# Patient Record
Sex: FEMALE | Race: WHITE | NOT HISPANIC OR LATINO | Employment: OTHER | URBAN - METROPOLITAN AREA
[De-identification: names, ages, dates, MRNs, and addresses within clinical notes are randomized per-mention and may not be internally consistent; named-entity substitution may affect disease eponyms.]

---

## 2016-05-31 LAB — MAMMOGRAM: NORMAL

## 2017-02-03 DIAGNOSIS — M41.50 DEGENERATIVE SCOLIOSIS IN ADULT PATIENT: ICD-10-CM

## 2017-02-03 DIAGNOSIS — M54.16 RIGHT LUMBAR RADICULOPATHY: ICD-10-CM

## 2017-02-03 DIAGNOSIS — M43.10 ACQUIRED SPONDYLOLISTHESIS: Primary | ICD-10-CM

## 2017-02-03 DIAGNOSIS — M81.0 OSTEOPOROSIS: ICD-10-CM

## 2017-02-03 DIAGNOSIS — M43.10 ANTEROLISTHESIS: ICD-10-CM

## 2017-02-21 ENCOUNTER — HOSPITAL ENCOUNTER (OUTPATIENT)
Dept: PHYSICAL MEDICINE AND REHAB | Facility: CLINIC | Age: 67
Discharge: HOME | End: 2017-02-21
Attending: PHYSICAL MEDICINE & REHABILITATION

## 2017-02-21 ENCOUNTER — HOSPITAL ENCOUNTER (OUTPATIENT)
Dept: PHYSICAL MEDICINE AND REHAB | Facility: CLINIC | Age: 67
Discharge: HOME | End: 2017-02-21

## 2017-02-21 DIAGNOSIS — M54.50 LUMBAR SPINE PAIN: ICD-10-CM

## 2017-02-21 DIAGNOSIS — F32.A DEPRESSION: ICD-10-CM

## 2017-02-21 DIAGNOSIS — F41.1 GENERALIZED ANXIETY DISORDER: ICD-10-CM

## 2017-02-21 DIAGNOSIS — F34.1 PERSISTENT DEPRESSIVE DISORDER: ICD-10-CM

## 2017-02-21 DIAGNOSIS — M79.18 MYOFASCIAL PAIN: ICD-10-CM

## 2017-02-21 DIAGNOSIS — M41.9 SCOLIOSIS: ICD-10-CM

## 2017-02-21 ASSESSMENT — MIFFLIN-ST. JEOR: SCORE: 860.16

## 2017-03-01 ENCOUNTER — COMMUNICATION - HEALTHEAST (OUTPATIENT)
Dept: PHYSICAL MEDICINE AND REHAB | Facility: CLINIC | Age: 67
End: 2017-03-01

## 2017-03-06 ENCOUNTER — COMMUNICATION - HEALTHEAST (OUTPATIENT)
Dept: SCHEDULING | Facility: CLINIC | Age: 67
End: 2017-03-06

## 2017-03-06 ENCOUNTER — HOSPITAL ENCOUNTER (OUTPATIENT)
Dept: RADIOLOGY | Facility: HOSPITAL | Age: 67
Discharge: HOME | End: 2017-03-06
Attending: PHYSICAL MEDICINE & REHABILITATION

## 2017-03-06 ENCOUNTER — HOSPITAL ENCOUNTER (OUTPATIENT)
Dept: PHYSICAL MEDICINE AND REHAB | Facility: CLINIC | Age: 67
Discharge: HOME | End: 2017-03-06
Attending: PHYSICAL MEDICINE & REHABILITATION

## 2017-03-06 DIAGNOSIS — M43.16 SPONDYLOLISTHESIS OF LUMBAR REGION: ICD-10-CM

## 2017-03-06 DIAGNOSIS — M47.816 ARTHROPATHY OF LUMBAR FACET JOINT: ICD-10-CM

## 2017-03-06 DIAGNOSIS — S22.000A THORACIC COMPRESSION FRACTURE (H): ICD-10-CM

## 2017-03-06 DIAGNOSIS — M79.18 MYOFASCIAL PAIN: ICD-10-CM

## 2017-03-06 DIAGNOSIS — M41.9 SCOLIOSIS: ICD-10-CM

## 2017-03-06 DIAGNOSIS — M54.50 LUMBAR SPINE PAIN: ICD-10-CM

## 2017-03-06 DIAGNOSIS — M81.0 OSTEOPOROSIS: ICD-10-CM

## 2017-03-06 ASSESSMENT — MIFFLIN-ST. JEOR: SCORE: 860.16

## 2017-03-07 ENCOUNTER — COMMUNICATION - HEALTHEAST (OUTPATIENT)
Dept: PHYSICAL MEDICINE AND REHAB | Facility: CLINIC | Age: 67
End: 2017-03-07

## 2017-03-08 ENCOUNTER — HOSPITAL ENCOUNTER (OUTPATIENT)
Dept: PHYSICAL MEDICINE AND REHAB | Facility: CLINIC | Age: 67
Discharge: HOME | End: 2017-03-08

## 2017-03-08 DIAGNOSIS — F41.1 GENERALIZED ANXIETY DISORDER: ICD-10-CM

## 2017-03-08 DIAGNOSIS — F34.1 PERSISTENT DEPRESSIVE DISORDER: ICD-10-CM

## 2017-03-15 ENCOUNTER — HOSPITAL ENCOUNTER (OUTPATIENT)
Dept: PHYSICAL MEDICINE AND REHAB | Facility: CLINIC | Age: 67
Discharge: HOME | End: 2017-03-15
Attending: PHYSICAL MEDICINE & REHABILITATION

## 2017-03-15 DIAGNOSIS — M47.816 ARTHROPATHY OF LUMBAR FACET JOINT: ICD-10-CM

## 2017-03-15 DIAGNOSIS — M12.88 OTHER SPECIFIC ARTHROPATHIES, NOT ELSEWHERE CLASSIFIED, OTHER SPECIFIED SITE: ICD-10-CM

## 2017-03-15 DIAGNOSIS — M43.16 SPONDYLOLISTHESIS OF LUMBAR REGION: ICD-10-CM

## 2017-03-15 DIAGNOSIS — M54.50 LUMBAR SPINE PAIN: ICD-10-CM

## 2017-03-21 ENCOUNTER — COMMUNICATION - HEALTHEAST (OUTPATIENT)
Dept: PHYSICAL MEDICINE AND REHAB | Facility: CLINIC | Age: 67
End: 2017-03-21

## 2017-03-21 ENCOUNTER — AMBULATORY - HEALTHEAST (OUTPATIENT)
Dept: PHYSICAL MEDICINE AND REHAB | Facility: CLINIC | Age: 67
End: 2017-03-21

## 2017-03-21 DIAGNOSIS — M47.816 LUMBAR SPONDYLOSIS: ICD-10-CM

## 2017-03-23 ENCOUNTER — RECORDS - HEALTHEAST (OUTPATIENT)
Dept: RADIOLOGY | Facility: CLINIC | Age: 67
End: 2017-03-23

## 2017-03-29 ENCOUNTER — AMBULATORY - HEALTHEAST (OUTPATIENT)
Dept: PHYSICAL MEDICINE AND REHAB | Facility: CLINIC | Age: 67
End: 2017-03-29

## 2017-03-29 ENCOUNTER — HOSPITAL ENCOUNTER (OUTPATIENT)
Dept: PHYSICAL MEDICINE AND REHAB | Facility: CLINIC | Age: 67
Discharge: HOME | End: 2017-03-29

## 2017-03-29 DIAGNOSIS — F41.1 GENERALIZED ANXIETY DISORDER: ICD-10-CM

## 2017-03-29 DIAGNOSIS — F34.1 PERSISTENT DEPRESSIVE DISORDER: ICD-10-CM

## 2017-04-04 ENCOUNTER — HOSPITAL ENCOUNTER (OUTPATIENT)
Dept: PHYSICAL MEDICINE AND REHAB | Facility: CLINIC | Age: 67
Discharge: HOME | End: 2017-04-04
Attending: PHYSICAL MEDICINE & REHABILITATION

## 2017-04-04 DIAGNOSIS — M47.816 LUMBAR SPONDYLOSIS: ICD-10-CM

## 2017-04-10 ENCOUNTER — COMMUNICATION - HEALTHEAST (OUTPATIENT)
Dept: FAMILY MEDICINE | Facility: CLINIC | Age: 67
End: 2017-04-10

## 2017-04-26 ENCOUNTER — HOSPITAL ENCOUNTER (OUTPATIENT)
Dept: PHYSICAL MEDICINE AND REHAB | Facility: CLINIC | Age: 67
Discharge: HOME | End: 2017-04-26
Attending: PHYSICAL MEDICINE & REHABILITATION

## 2017-04-26 DIAGNOSIS — F32.A DEPRESSION: ICD-10-CM

## 2017-04-26 DIAGNOSIS — S22.089A T11 VERTEBRAL FRACTURE (H): ICD-10-CM

## 2017-04-26 DIAGNOSIS — M79.18 MYOFASCIAL PAIN: ICD-10-CM

## 2017-04-26 DIAGNOSIS — M47.816 LUMBAR SPONDYLOSIS: ICD-10-CM

## 2017-04-26 DIAGNOSIS — M54.50 LUMBAR SPINE PAIN: ICD-10-CM

## 2017-04-26 DIAGNOSIS — M43.16 SPONDYLOLISTHESIS OF LUMBAR REGION: ICD-10-CM

## 2017-04-26 ASSESSMENT — MIFFLIN-ST. JEOR: SCORE: 864.69

## 2017-05-02 ENCOUNTER — OFFICE VISIT - HEALTHEAST (OUTPATIENT)
Dept: FAMILY MEDICINE | Facility: CLINIC | Age: 67
End: 2017-05-02

## 2017-05-02 DIAGNOSIS — F51.01 PRIMARY INSOMNIA: ICD-10-CM

## 2017-05-02 DIAGNOSIS — Z00.00 ROUTINE GENERAL MEDICAL EXAMINATION AT A HEALTH CARE FACILITY: ICD-10-CM

## 2017-05-02 DIAGNOSIS — J02.9 PHARYNGITIS: ICD-10-CM

## 2017-05-02 DIAGNOSIS — D12.6 TUBULAR ADENOMA OF COLON: ICD-10-CM

## 2017-05-02 DIAGNOSIS — F33.9 MAJOR DEPRESSION, RECURRENT (H): ICD-10-CM

## 2017-05-02 DIAGNOSIS — Z13.228 ENCOUNTER FOR SCREENING FOR OTHER METABOLIC DISORDERS: ICD-10-CM

## 2017-05-02 DIAGNOSIS — F41.1 ANXIETY, GENERALIZED: ICD-10-CM

## 2017-05-02 ASSESSMENT — MIFFLIN-ST. JEOR: SCORE: 875.46

## 2017-05-03 LAB
CHOLEST SERPL-MCNC: 204 MG/DL
FASTING STATUS PATIENT QL REPORTED: YES
HDLC SERPL-MCNC: 55 MG/DL
LDLC SERPL CALC-MCNC: 131 MG/DL
TRIGL SERPL-MCNC: 88 MG/DL

## 2017-05-07 ENCOUNTER — COMMUNICATION - HEALTHEAST (OUTPATIENT)
Dept: FAMILY MEDICINE | Facility: CLINIC | Age: 67
End: 2017-05-07

## 2017-05-15 ENCOUNTER — OFFICE VISIT - HEALTHEAST (OUTPATIENT)
Dept: FAMILY MEDICINE | Facility: CLINIC | Age: 67
End: 2017-05-15

## 2017-05-15 ENCOUNTER — COMMUNICATION - HEALTHEAST (OUTPATIENT)
Dept: TELEHEALTH | Facility: CLINIC | Age: 67
End: 2017-05-15

## 2017-05-15 DIAGNOSIS — F33.9 MAJOR DEPRESSION, RECURRENT (H): ICD-10-CM

## 2017-05-15 DIAGNOSIS — Z23 IMMUNIZATION DUE: ICD-10-CM

## 2017-05-15 DIAGNOSIS — E78.00 HYPERCHOLESTEROLEMIA: ICD-10-CM

## 2017-05-15 DIAGNOSIS — E87.1 HYPONATREMIA: ICD-10-CM

## 2017-05-16 ENCOUNTER — COMMUNICATION - HEALTHEAST (OUTPATIENT)
Dept: FAMILY MEDICINE | Facility: CLINIC | Age: 67
End: 2017-05-16

## 2017-05-16 ENCOUNTER — TELEPHONE (OUTPATIENT)
Dept: ORTHOPEDICS | Facility: CLINIC | Age: 67
End: 2017-05-16

## 2017-05-16 NOTE — TELEPHONE ENCOUNTER
Dropbox on first floor was cleaned out. Imaging disk and records for Leeanne from July 2016 were still in box. Records and imaging disk were shredded.

## 2017-05-22 ENCOUNTER — COMMUNICATION - HEALTHEAST (OUTPATIENT)
Dept: PHYSICAL MEDICINE AND REHAB | Facility: CLINIC | Age: 67
End: 2017-05-22

## 2017-05-22 ENCOUNTER — COMMUNICATION - HEALTHEAST (OUTPATIENT)
Dept: FAMILY MEDICINE | Facility: CLINIC | Age: 67
End: 2017-05-22

## 2017-05-22 DIAGNOSIS — Z12.11 SCREEN FOR COLON CANCER: ICD-10-CM

## 2017-06-12 ENCOUNTER — OFFICE VISIT - HEALTHEAST (OUTPATIENT)
Dept: FAMILY MEDICINE | Facility: CLINIC | Age: 67
End: 2017-06-12

## 2017-06-12 DIAGNOSIS — E87.1 HYPONATREMIA: ICD-10-CM

## 2017-06-12 DIAGNOSIS — M25.552 LEFT HIP PAIN: ICD-10-CM

## 2017-06-12 DIAGNOSIS — F33.9 MAJOR DEPRESSION, RECURRENT (H): ICD-10-CM

## 2017-06-14 ENCOUNTER — OFFICE VISIT - HEALTHEAST (OUTPATIENT)
Dept: PODIATRY | Facility: CLINIC | Age: 67
End: 2017-06-14

## 2017-06-14 ENCOUNTER — COMMUNICATION - HEALTHEAST (OUTPATIENT)
Dept: FAMILY MEDICINE | Facility: CLINIC | Age: 67
End: 2017-06-14

## 2017-06-14 DIAGNOSIS — L84 TYLOMA: ICD-10-CM

## 2017-06-20 ENCOUNTER — TELEPHONE (OUTPATIENT)
Dept: GASTROENTEROLOGY | Facility: OUTPATIENT CENTER | Age: 67
End: 2017-06-20

## 2017-06-22 ENCOUNTER — TELEPHONE (OUTPATIENT)
Dept: GASTROENTEROLOGY | Facility: OUTPATIENT CENTER | Age: 67
End: 2017-06-22

## 2017-06-22 ENCOUNTER — PRE VISIT (OUTPATIENT)
Dept: OTOLARYNGOLOGY | Facility: CLINIC | Age: 67
End: 2017-06-22

## 2017-06-22 DIAGNOSIS — Z12.11 ENCOUNTER FOR SCREENING COLONOSCOPY: Primary | ICD-10-CM

## 2017-06-22 NOTE — TELEPHONE ENCOUNTER
Patient taking any blood thinners ? Ibuprofen prn    Heart disease ? denies    Lung disease ? denies      Sleep apnea ? denies    Diabetic ? denies    Kidney disease ? denies    Dialysis ?n/a    Electronic implanted medical devices ? denies    Are you taking any narcotic pain medication ? no  What is your daily dosage ?    PTSD ? n/a    Prep instructions reviewed with patient ? Instructions,  policy, MAC sedation plan reviewed. Advised patient to have someone stay with her post exam    Pharmacy : [facility]    Indication for procedure : Screening colonosopy    Referring provider : Self

## 2017-06-22 NOTE — TELEPHONE ENCOUNTER
1.  Date/reason for appt:  6/29/17   Ear Wax    2.  Referring provider:  Self    3.  Call to patient (Yes / No - short description):  No, established pt    Records reviewed.  All records are in Epic

## 2017-06-27 ENCOUNTER — OFFICE VISIT - HEALTHEAST (OUTPATIENT)
Dept: INTERNAL MEDICINE | Facility: CLINIC | Age: 67
End: 2017-06-27

## 2017-06-27 DIAGNOSIS — M81.0 OSTEOPOROSIS: ICD-10-CM

## 2017-06-27 DIAGNOSIS — S22.089A: ICD-10-CM

## 2017-06-28 LAB
ALBUMIN PERCENT: 54.3 % (ref 51–67)
ALBUMIN SERPL ELPH-MCNC: 4 G/DL (ref 3.2–4.7)
ALPHA 1 PERCENT: 3.3 % (ref 2–4)
ALPHA 2 PERCENT: 11.1 % (ref 5–13)
ALPHA1 GLOB SERPL ELPH-MCNC: 0.2 G/DL (ref 0.1–0.3)
ALPHA2 GLOB SERPL ELPH-MCNC: 0.8 G/DL (ref 0.4–0.9)
B-GLOBULIN SERPL ELPH-MCNC: 0.9 G/DL (ref 0.7–1.2)
BETA PERCENT: 12.9 % (ref 10–17)
GAMMA GLOB SERPL ELPH-MCNC: 1.3 G/DL (ref 0.6–1.4)
GAMMA GLOBULIN PERCENT: 18.4 % (ref 9–20)
PATH ICD:: NORMAL
PROT PATTERN SERPL ELPH-IMP: NORMAL
PROT SERPL-MCNC: 7.3 G/DL (ref 6–8)
REVIEWING PATHOLOGIST: NORMAL

## 2017-06-29 LAB
GLIADIN IGA SER-ACNC: 1.2 U/ML
GLIADIN IGG SER-ACNC: 0.7 U/ML
IGA SERPL-MCNC: 276 MG/DL (ref 65–400)
PATH ICD:: NORMAL
PROT PATTERN SERPL ELPH-IMP: NORMAL
REVIEWING PATHOLOGIST: NORMAL
TOTAL PROTEIN RANDOM URINE MG/DL: <7 MG/DL
TTG IGA SER-ACNC: 0.6 U/ML
TTG IGG SER-ACNC: 1.3 U/ML

## 2017-07-01 ENCOUNTER — HEALTH MAINTENANCE LETTER (OUTPATIENT)
Age: 67
End: 2017-07-01

## 2017-07-05 ENCOUNTER — DOCUMENTATION ONLY (OUTPATIENT)
Dept: GASTROENTEROLOGY | Facility: OUTPATIENT CENTER | Age: 67
End: 2017-07-05

## 2017-07-05 ENCOUNTER — TRANSFERRED RECORDS (OUTPATIENT)
Dept: HEALTH INFORMATION MANAGEMENT | Facility: CLINIC | Age: 67
End: 2017-07-05

## 2017-07-10 ENCOUNTER — COMMUNICATION - HEALTHEAST (OUTPATIENT)
Dept: INTERNAL MEDICINE | Facility: CLINIC | Age: 67
End: 2017-07-10

## 2017-07-14 ENCOUNTER — AMBULATORY - HEALTHEAST (OUTPATIENT)
Dept: PHYSICAL MEDICINE AND REHAB | Facility: CLINIC | Age: 67
End: 2017-07-14

## 2017-07-14 ENCOUNTER — COMMUNICATION - HEALTHEAST (OUTPATIENT)
Dept: PHYSICAL MEDICINE AND REHAB | Facility: CLINIC | Age: 67
End: 2017-07-14

## 2017-07-20 ENCOUNTER — AMBULATORY - HEALTHEAST (OUTPATIENT)
Dept: NURSING | Facility: CLINIC | Age: 67
End: 2017-07-20

## 2017-07-20 DIAGNOSIS — M81.0 OSTEOPOROSIS: ICD-10-CM

## 2017-08-08 ENCOUNTER — OFFICE VISIT - HEALTHEAST (OUTPATIENT)
Dept: FAMILY MEDICINE | Facility: CLINIC | Age: 67
End: 2017-08-08

## 2017-08-08 DIAGNOSIS — F33.9 MAJOR DEPRESSION, RECURRENT (H): ICD-10-CM

## 2017-08-08 DIAGNOSIS — H61.20 CERUMEN IMPACTION: ICD-10-CM

## 2017-12-30 ENCOUNTER — COMMUNICATION - HEALTHEAST (OUTPATIENT)
Dept: FAMILY MEDICINE | Facility: CLINIC | Age: 67
End: 2017-12-30

## 2017-12-30 DIAGNOSIS — K64.9 HEMORRHOIDS: ICD-10-CM

## 2018-01-05 ENCOUNTER — COMMUNICATION - HEALTHEAST (OUTPATIENT)
Dept: SURGERY | Facility: CLINIC | Age: 68
End: 2018-01-05

## 2018-01-18 ENCOUNTER — RECORDS - HEALTHEAST (OUTPATIENT)
Dept: ADMINISTRATIVE | Facility: OTHER | Age: 68
End: 2018-01-18

## 2018-01-23 ENCOUNTER — AMBULATORY - HEALTHEAST (OUTPATIENT)
Dept: NURSING | Facility: CLINIC | Age: 68
End: 2018-01-23

## 2018-01-23 DIAGNOSIS — M81.0 OSTEOPOROSIS: ICD-10-CM

## 2018-02-02 ENCOUNTER — RECORDS - HEALTHEAST (OUTPATIENT)
Dept: ADMINISTRATIVE | Facility: OTHER | Age: 68
End: 2018-02-02

## 2018-03-15 ENCOUNTER — OFFICE VISIT - HEALTHEAST (OUTPATIENT)
Dept: FAMILY MEDICINE | Facility: CLINIC | Age: 68
End: 2018-03-15

## 2018-03-15 DIAGNOSIS — J20.9 ACUTE BRONCHITIS: ICD-10-CM

## 2018-03-22 ENCOUNTER — OFFICE VISIT - HEALTHEAST (OUTPATIENT)
Dept: FAMILY MEDICINE | Facility: CLINIC | Age: 68
End: 2018-03-22

## 2018-03-22 DIAGNOSIS — J40 BRONCHITIS: ICD-10-CM

## 2018-03-24 ENCOUNTER — COMMUNICATION - HEALTHEAST (OUTPATIENT)
Dept: FAMILY MEDICINE | Facility: CLINIC | Age: 68
End: 2018-03-24

## 2018-03-27 ENCOUNTER — COMMUNICATION - HEALTHEAST (OUTPATIENT)
Dept: FAMILY MEDICINE | Facility: CLINIC | Age: 68
End: 2018-03-27

## 2018-07-08 ENCOUNTER — HEALTH MAINTENANCE LETTER (OUTPATIENT)
Age: 68
End: 2018-07-08

## 2018-07-12 ENCOUNTER — COMMUNICATION - HEALTHEAST (OUTPATIENT)
Dept: FAMILY MEDICINE | Facility: CLINIC | Age: 68
End: 2018-07-12

## 2018-07-24 ENCOUNTER — RECORDS - HEALTHEAST (OUTPATIENT)
Dept: ADMINISTRATIVE | Facility: OTHER | Age: 68
End: 2018-07-24

## 2018-07-24 ENCOUNTER — OFFICE VISIT - HEALTHEAST (OUTPATIENT)
Dept: INTERNAL MEDICINE | Facility: CLINIC | Age: 68
End: 2018-07-24

## 2018-07-24 ENCOUNTER — RECORDS - HEALTHEAST (OUTPATIENT)
Dept: BONE DENSITY | Facility: CLINIC | Age: 68
End: 2018-07-24

## 2018-07-24 DIAGNOSIS — S22.089A UNSPECIFIED FRACTURE OF T11-T12 VERTEBRA, INITIAL ENCOUNTER FOR CLOSED FRACTURE (H): ICD-10-CM

## 2018-07-24 DIAGNOSIS — M81.0 AGE-RELATED OSTEOPOROSIS WITHOUT CURRENT PATHOLOGICAL FRACTURE: ICD-10-CM

## 2018-07-24 DIAGNOSIS — M81.0 OSTEOPOROSIS: ICD-10-CM

## 2018-07-24 DIAGNOSIS — S22.000S WEDGE COMPRESSION FRACTURE OF UNSPECIFIED THORACIC VERTEBRA, SEQUELA: ICD-10-CM

## 2019-01-24 ENCOUNTER — AMBULATORY - HEALTHEAST (OUTPATIENT)
Dept: NURSING | Facility: CLINIC | Age: 69
End: 2019-01-24

## 2019-01-29 ENCOUNTER — DOCUMENTATION ONLY (OUTPATIENT)
Dept: CARE COORDINATION | Facility: CLINIC | Age: 69
End: 2019-01-29

## 2019-02-01 NOTE — TELEPHONE ENCOUNTER
RECORDS RECEIVED FROM: LOW BACK PAIN, PER DR ANN    DATE RECEIVED: 02/01/19    NOTES STATUS DETAILS   OFFICE NOTE from referring provider Internal    OFFICE NOTE from other specialist Care Everywhere    DISCHARGE SUMMARY from hospital N/A    DISCHARGE REPORT from the ER N/A    OPERATIVE REPORT N/A    MEDICATION LIST Internal    IMPLANT RECORD/STICKER N/A    LABS     CBC/DIFF N/A    CULTURES N/A    INJECTIONS DONE IN RADIOLOGY Care Everywhere    MRI Internal    CT SCAN Internal    XRAYS (IMAGES & REPORTS) Received [facility]   TUMOR     PATHOLOGY  Slides & report N/A      02/01/19  4:58 PM called [facility] for imaging, they will send

## 2019-02-04 ENCOUNTER — TELEPHONE (OUTPATIENT)
Dept: ORTHOPEDICS | Facility: CLINIC | Age: 69
End: 2019-02-04

## 2019-02-04 NOTE — TELEPHONE ENCOUNTER
Called patient and left message asking if they were thinking about surgery. If yes was going to reschedule them for Friday and enter and MRI being the one we have is out of date for Dr. Ventura. If not thinking of surgery they could be seen tomorrow with EOS XRs  Asked them to call back

## 2019-02-05 ENCOUNTER — ANCILLARY PROCEDURE (OUTPATIENT)
Dept: GENERAL RADIOLOGY | Facility: CLINIC | Age: 69
End: 2019-02-05
Attending: ORTHOPAEDIC SURGERY
Payer: COMMERCIAL

## 2019-02-05 ENCOUNTER — ANCILLARY PROCEDURE (OUTPATIENT)
Dept: CT IMAGING | Facility: CLINIC | Age: 69
End: 2019-02-05
Attending: ORTHOPAEDIC SURGERY
Payer: COMMERCIAL

## 2019-02-05 ENCOUNTER — PRE VISIT (OUTPATIENT)
Dept: ORTHOPEDICS | Facility: CLINIC | Age: 69
End: 2019-02-05

## 2019-02-05 ENCOUNTER — OFFICE VISIT (OUTPATIENT)
Dept: ORTHOPEDICS | Facility: CLINIC | Age: 69
End: 2019-02-05
Payer: COMMERCIAL

## 2019-02-05 VITALS — WEIGHT: 95 LBS | HEIGHT: 58 IN | BODY MASS INDEX: 19.94 KG/M2

## 2019-02-05 DIAGNOSIS — Z13.21 SCREENING FOR ENDOCRINE, NUTRITIONAL, METABOLIC AND IMMUNITY DISORDER: ICD-10-CM

## 2019-02-05 DIAGNOSIS — M41.50 DEGENERATIVE SCOLIOSIS IN ADULT PATIENT: ICD-10-CM

## 2019-02-05 DIAGNOSIS — Z13.29 SCREENING FOR ENDOCRINE, NUTRITIONAL, METABOLIC AND IMMUNITY DISORDER: ICD-10-CM

## 2019-02-05 DIAGNOSIS — M41.20 OTHER IDIOPATHIC SCOLIOSIS, UNSPECIFIED SPINAL REGION: ICD-10-CM

## 2019-02-05 DIAGNOSIS — Z13.0 SCREENING FOR ENDOCRINE, NUTRITIONAL, METABOLIC AND IMMUNITY DISORDER: ICD-10-CM

## 2019-02-05 DIAGNOSIS — Z13.228 SCREENING FOR ENDOCRINE, NUTRITIONAL, METABOLIC AND IMMUNITY DISORDER: ICD-10-CM

## 2019-02-05 DIAGNOSIS — E55.9 VITAMIN D DEFICIENCY: ICD-10-CM

## 2019-02-05 DIAGNOSIS — M43.10 ANTEROLISTHESIS: ICD-10-CM

## 2019-02-05 DIAGNOSIS — M43.10 ACQUIRED SPONDYLOLISTHESIS: Primary | ICD-10-CM

## 2019-02-05 LAB
MRSA DNA SPEC QL NAA+PROBE: NEGATIVE
SPECIMEN SOURCE: NORMAL

## 2019-02-05 RX ORDER — CHOLECALCIFEROL (VITAMIN D3) 50 MCG
2000 TABLET ORAL DAILY
Qty: 100 TABLET | Refills: 3 | Status: SHIPPED | OUTPATIENT
Start: 2019-02-05 | End: 2019-03-12

## 2019-02-05 RX ORDER — INFLUENZA VACCINE, ADJUVANTED 15; 15; 15 UG/.5ML; UG/.5ML; UG/.5ML
INJECTION, SUSPENSION INTRAMUSCULAR
Refills: 0 | COMMUNITY
Start: 2018-11-15 | End: 2022-11-09

## 2019-02-05 RX ORDER — ESCITALOPRAM OXALATE 10 MG/1
TABLET ORAL
Refills: 3 | COMMUNITY
Start: 2018-10-08 | End: 2022-03-11

## 2019-02-05 RX ORDER — CALCIUM CARBONATE 500(1250)
1 TABLET ORAL DAILY
Qty: 90 TABLET | Refills: 3 | Status: SHIPPED | OUTPATIENT
Start: 2019-02-05 | End: 2019-03-12

## 2019-02-05 ASSESSMENT — ENCOUNTER SYMPTOMS
MUSCLE WEAKNESS: 1
ARTHRALGIAS: 1
BACK PAIN: 1
MYALGIAS: 1
NECK PAIN: 1
STIFFNESS: 1
JOINT SWELLING: 0
MUSCLE CRAMPS: 0

## 2019-02-05 ASSESSMENT — MIFFLIN-ST. JEOR: SCORE: 845.67

## 2019-02-05 NOTE — LETTER
2/5/2019       RE: Leeanne Duarte  [id number]  [place] [address]     Dear Colleague,    Thank you for referring your patient, Leeanne Duarte, to the [facility] Please see a copy of my visit note below.    Spine Surgery Consultation    REFERRING PHYSICIAN: Referred Self   PRIMARY CARE PHYSICIAN: Robert Montemayor           Chief Complaint:   Consult (low back pain )      History of Present Illness:  Symptom Profile Including: location of symptoms, onset, severity, exacerbating/alleviating factors, previous treatments:        Leeanne Duarte is a 68 year old female who presents for evaluation of predominantly right buttock and leg symptoms in an L5 and S1 distribution.  She has a modest amount of back pain but she says this is tolerable and what really bothers her is the leg pain.  It is worse in the buttock and leg and worse when she is up and walking and improves somewhat with rest.  She is been dealing with this for a number of years.  She tried gabapentin at one point and it made her totally dizzy and she could not tolerate it.  She did several rounds of physical therapy in 2016 2017 and she is continued the exercises at home and she thinks they are no longer effective.  She is been doing yoga pretty regularly.  She had a series of epidural injections one in 2016 and then again in 2017.  The 2016 injection was a right L5-S1 transforaminal epidural and she says that helped her feel amazing for several weeks but she did not want to continue it because she has osteoporosis and she was worried about the effect of bone health of the steroids.    Over the last year she thinks things have been worsening.  She has a past bad experience with an infection after a hamstring reconstruction and it was quite a lot to go through so she has been hesitant to consider surgery but she thinks things are getting bad enough that she  "might want to do it now.         Past Medical History:   No past medical history on file.         Past Surgical History:     Past Surgical History:   Procedure Laterality Date     CATARACT IOL, RT/LT  2014            Social History:     Social History     Tobacco Use     Smoking status: Never Smoker     Smokeless tobacco: Never Used   Substance Use Topics     Alcohol use: Yes     Comment: 7 per week            Family History:     Family History   Problem Relation Age of Onset     High cholesterol Mother      Hypertension Mother      Myocardial Infarction Mother 72     High cholesterol Father      Hypertension Father      Myocardial Infarction Father 76     Colon Cancer Maternal Grandmother      Colon Cancer Maternal Grandfather             Allergies:     Allergies   Allergen Reactions     Chocolate      Orange             Medications:     Current Outpatient Medications   Medication     Ascorbic Acid (VITAMIN C) 500 MG CAPS     calcium carbonate (OS-LIVE) 500 MG tablet     Calcium Citrate-Vitamin D (CALCIUM CITRATE + D) 300-100 MG-UNIT TABS     IBUPROFEN PO     LORAZEPAM PO     vitamin D3 (CHOLECALCIFEROL) 2000 units tablet     escitalopram (LEXAPRO) 10 MG tablet     estrogens, conjugated, (PREMARIN) 0.3 MG tablet     FLUAD 0.5 ML TALI     No current facility-administered medications for this visit.              Review of Systems:     A 10 point ROS was performed and reviewed. Specific responses to these questions are noted at the end of the document.         Physical Exam:   Vitals: Ht 1.473 m (4' 10\")   Wt 43.1 kg (95 lb)   BMI 19.86 kg/m     Constitutional: awake, alert, cooperative, no apparent distress, appears stated age.    Eyes: The sclera are white.  Ears, Nose, Throat: The trachea is midline.  Psychiatric: The patient has a normal affect.  Respiratory: breathing non-labored  Cardiovascular: The extremities are warm and perfused.  Skin: no obvious rashes or lesions.  Musculoskeletal, Neurologic, and Spine: "          Lumbar Spine:    Appearance - No gross stepoffs or deformities    Motor -     L2-3: Hip flexion 5/5 R and 5/5 L strength          L3/4:  Knee extension R 5/5 and L 5/5 strength         L4/5:  Foot dorsiflexion R 5/5 L 5/5 and       EHL dorsiflexion R 4/5 L 4/5 strength         S1:  Plantarflexion/Peroneal Muscles  R 5/5 and L 5/5 strength    Sensation: intact to light touch L3-S1 distribution BLE      Neurologic:      REFLEXES Left Right                  Patella 1+ 1+   Ankle jerk 1+ 1+   Babinski No upgoing great toe No upgoing great toe   Clonus 0 beats 0 beats     Hip Exam:  No pain with hip log roll and no tenderness over the greater trochanters.    Alignment:  Patient stands with a neutral standing sagittal balance.           Imaging:   We ordered and independently reviewed new radiographs at this clinic visit. The results were discussed with the patient.  Findings include:    May 5, 2016 lumbar MRI shows severe multilevel degenerative spondylosis with moderate to severe lateral recess stenosis L4-5 due to trace anterolisthesis and facet hypertrophy as well as severe right greater than left foraminal stenosis L5-S1 due to broad-based disc bulge and severe spondylosis moderate spondylosis and mild to moderate stenosis at L3-4 with significant disc space height loss and disc osteophyte complex    Standing scoliosis radiographs February 5, 2019 show an apex at L2-3 with a 27 degree lumbar curvature.  Multilevel degenerative changes.             Assessment and Plan:   Assessment:  68 year old female with degenerative scoliosis and foraminal stenosis L5-S1 with central stenosis L3-4 and L4-5.     Plan:  1. I am going to refer her for another round of lumbar physical therapy and a repeat right-sided L5-S1 transforaminal epidural injection since she previously did well with that injection in the past.  2. I am going to update her lumbar CT and MRI scan since the previous scan is 3 years old.    If she did  need surgery, based on her previous imaging studies I would likely recommend an L3-S1 decompression with an L5-S1 fusion to address the foraminal stenosis at L5-S1 and the central stenosis from L3-S1.    Risks of this surgery include risk of infection, risk of dural tear resulting in CSF leak which might result in headaches, or possible need for lumbar drain, or possible revision surgery in the setting of a persistent leak. Risk of hematoma or seroma resulting in wound complications.  Possible nerve root injury resulting in numbness weakness or paralysis into the arms or legs. Possible radiculitis which could result in similar symptoms or could result in significant neurogenic type pain. There is also a risk of delayed onset nerve root pals or radiculitis, which I explained is potentially due to stretch injury or possibly due to delayed onset swelling, and is sometimes temporary but can also be permanent.  Risk of incomplete decompression which might require revision surgery in the future.  Risk of adjacent segment problems requiring surgery in the future. Risk of incomplete relief of symptoms possibly requiring revision surgery in the future. Furthermore, although rare, there are risks of major vessel injury such as to the major vessels anteriorly or to the bowel from the surgery.  Sometimes this can happen if an instrument is passed anteriorly through the disc space. There is a risk of nonunion which might require revision surgery in the future, and risk of hardware complications from the instrumentation.  I do use imaging to help guide the placement of the instrumentation, but even with this there is a chance of implant malposition or problem.  There is a risk of blood clots in the legs or the lungs.  Lastly, although rare, there are certainly risks of the anesthetic including stroke heart attack and death.      I explained that we used Stealth navigation with an O-Arm to place the instrumentation.  This is a  form of CT-guided navigation for the screws.  We take an intraoperative CT scan which provides an accurate view of the bony anatomy and we then place the screws using the imaging guidance and then take a second CT scan to help verify the screw position. So the benefit of this technology is a high accuracy for screw placement.    She does have a degenerative scoliosis but is really not a back pain problem it is really just the leg pain that bothers her and so she and I both agreed that if she did have surgery we would not try to do an extensive scoliosis corrective procedure.  She thinks she might want to have surgery in March after an upcoming trip.  I am going to try and arrange an anesthesia clinic visit for her and we will plan to see her back with the imaging studies to finalize things.  In the meantime she will get started again with her therapy and will have the injection done for some short-term pain relief.    Respectfully,  Lane Ventura MD  Spine Surgery  [facility]

## 2019-02-05 NOTE — NURSING NOTE
"Reason For Visit:   Chief Complaint   Patient presents with     Consult     low back pain        Primary MD: Robert Montemayor  Ref. MD: Self    ?  No  Occupation retired.  Currently working? No.  Work status?  Retired.  Date of injury: many years ago   Type of injury: chronic .  Date of surgery: none   Type of surgery: none .  Smoker: No  Request smoking cessation information: No    Ht 1.473 m (4' 10\")   Wt 43.1 kg (95 lb)   BMI 19.86 kg/m      Pain Assessment  Patient Currently in Pain: Yes  0-10 Pain Scale: 4  Primary Pain Location: Back  Pain Descriptors: Radiating    Oswestry (BEENA) Questionnaire    OSWESTRY DISABILITY INDEX 7/26/2016   Count 9   Sum 17   Oswestry Score (%) 37.78   Some recent data might be hidden            Neck Disability Index (NDI) Questionnaire    No flowsheet data found.                Promis 10 Assessment    PROMIS 10 6/9/2016   In general, would you say your health is: Good = 3   In general, would you say your quality of life is: Excellent = 5   In general, how would you rate your physical health? Good = 3   In general, how would you rate your mental health, including your mood and your ability to think? Good = 3   In general, how would you rate your satisfaction with your social activities and relationships? Good = 3   In general, please rate how well you carry out your usual social activities and roles Good = 3   To what extent are you able to carry out your everyday physical activities such as walking, climbing stairs, carrying groceries, or moving a chair? Mostly = 4   How often have you been bothered by emotional problems such as feeling anxious, depressed or irritable? Often = 2   How would you rate your fatigue on average? Mild = 4   How would you rate your pain on average?   0 = No Pain  to  10 = Worst Imaginable Pain 1   Global Physical Health Score : Raw Score 15 (SUM : G03 - G06 - G07 - G08)   Global Mental Health Score : Raw Score 13 (SUM : G02 - G04 " - G05 - G10)   Total (Physical + Mental Health Score) 28   Some recent data might be hidden                Randell Esteves ATC

## 2019-02-05 NOTE — PROGRESS NOTES
Spine Surgery Consultation    REFERRING PHYSICIAN: Referred Self   PRIMARY CARE PHYSICIAN: Robert Montemayor           Chief Complaint:   Consult (low back pain )      History of Present Illness:  Symptom Profile Including: location of symptoms, onset, severity, exacerbating/alleviating factors, previous treatments:        Leeanne Duarte is a 68 year old female who presents for evaluation of predominantly right buttock and leg symptoms in an L5 and S1 distribution.  She has a modest amount of back pain but she says this is tolerable and what really bothers her is the leg pain.  It is worse in the buttock and leg and worse when she is up and walking and improves somewhat with rest.  She is been dealing with this for a number of years.  She tried gabapentin at one point and it made her totally dizzy and she could not tolerate it.  She did several rounds of physical therapy in 2016 2017 and she is continued the exercises at home and she thinks they are no longer effective.  She is been doing yoga pretty regularly.  She had a series of epidural injections one in 2016 and then again in 2017.  The 2016 injection was a right L5-S1 transforaminal epidural and she says that helped her feel amazing for several weeks but she did not want to continue it because she has osteoporosis and she was worried about the effect of bone health of the steroids.    Over the last year she thinks things have been worsening.  She has a past bad experience with an infection after a hamstring reconstruction and it was quite a lot to go through so she has been hesitant to consider surgery but she thinks things are getting bad enough that she might want to do it now.         Past Medical History:   No past medical history on file.         Past Surgical History:     Past Surgical History:   Procedure Laterality Date     CATARACT IOL, RT/LT  2014            Social History:     Social History     Tobacco Use     Smoking status:  "Never Smoker     Smokeless tobacco: Never Used   Substance Use Topics     Alcohol use: Yes     Comment: 7 per week            Family History:     Family History   Problem Relation Age of Onset     High cholesterol Mother      Hypertension Mother      Myocardial Infarction Mother 72     High cholesterol Father      Hypertension Father      Myocardial Infarction Father 76     Colon Cancer Maternal Grandmother      Colon Cancer Maternal Grandfather             Allergies:     Allergies   Allergen Reactions     Chocolate      Orange             Medications:     Current Outpatient Medications   Medication     Ascorbic Acid (VITAMIN C) 500 MG CAPS     calcium carbonate (OS-LIVE) 500 MG tablet     Calcium Citrate-Vitamin D (CALCIUM CITRATE + D) 300-100 MG-UNIT TABS     IBUPROFEN PO     LORAZEPAM PO     vitamin D3 (CHOLECALCIFEROL) 2000 units tablet     escitalopram (LEXAPRO) 10 MG tablet     estrogens, conjugated, (PREMARIN) 0.3 MG tablet     FLUAD 0.5 ML TALI     No current facility-administered medications for this visit.              Review of Systems:     A 10 point ROS was performed and reviewed. Specific responses to these questions are noted at the end of the document.         Physical Exam:   Vitals: Ht 1.473 m (4' 10\")   Wt 43.1 kg (95 lb)   BMI 19.86 kg/m    Constitutional: awake, alert, cooperative, no apparent distress, appears stated age.    Eyes: The sclera are white.  Ears, Nose, Throat: The trachea is midline.  Psychiatric: The patient has a normal affect.  Respiratory: breathing non-labored  Cardiovascular: The extremities are warm and perfused.  Skin: no obvious rashes or lesions.  Musculoskeletal, Neurologic, and Spine:          Lumbar Spine:    Appearance - No gross stepoffs or deformities    Motor -     L2-3: Hip flexion 5/5 R and 5/5 L strength          L3/4:  Knee extension R 5/5 and L 5/5 strength         L4/5:  Foot dorsiflexion R 5/5 L 5/5 and       EHL dorsiflexion R 4/5 L 4/5 strength       "   S1:  Plantarflexion/Peroneal Muscles  R 5/5 and L 5/5 strength    Sensation: intact to light touch L3-S1 distribution BLE      Neurologic:      REFLEXES Left Right                  Patella 1+ 1+   Ankle jerk 1+ 1+   Babinski No upgoing great toe No upgoing great toe   Clonus 0 beats 0 beats     Hip Exam:  No pain with hip log roll and no tenderness over the greater trochanters.    Alignment:  Patient stands with a neutral standing sagittal balance.           Imaging:   We ordered and independently reviewed new radiographs at this clinic visit. The results were discussed with the patient.  Findings include:    May 5, 2016 lumbar MRI shows severe multilevel degenerative spondylosis with moderate to severe lateral recess stenosis L4-5 due to trace anterolisthesis and facet hypertrophy as well as severe right greater than left foraminal stenosis L5-S1 due to broad-based disc bulge and severe spondylosis moderate spondylosis and mild to moderate stenosis at L3-4 with significant disc space height loss and disc osteophyte complex    Standing scoliosis radiographs February 5, 2019 show an apex at L2-3 with a 27 degree lumbar curvature.  Multilevel degenerative changes.             Assessment and Plan:   Assessment:  68 year old female with degenerative scoliosis and foraminal stenosis L5-S1 with central stenosis L3-4 and L4-5.     Plan:  1. I am going to refer her for another round of lumbar physical therapy and a repeat right-sided L5-S1 transforaminal epidural injection since she previously did well with that injection in the past.  2. I am going to update her lumbar CT and MRI scan since the previous scan is 3 years old.    If she did need surgery, based on her previous imaging studies I would likely recommend an L3-S1 decompression with an L5-S1 fusion to address the foraminal stenosis at L5-S1 and the central stenosis from L3-S1.    Risks of this surgery include risk of infection, risk of dural tear resulting in CSF  leak which might result in headaches, or possible need for lumbar drain, or possible revision surgery in the setting of a persistent leak. Risk of hematoma or seroma resulting in wound complications.  Possible nerve root injury resulting in numbness weakness or paralysis into the arms or legs. Possible radiculitis which could result in similar symptoms or could result in significant neurogenic type pain. There is also a risk of delayed onset nerve root pals or radiculitis, which I explained is potentially due to stretch injury or possibly due to delayed onset swelling, and is sometimes temporary but can also be permanent.  Risk of incomplete decompression which might require revision surgery in the future.  Risk of adjacent segment problems requiring surgery in the future. Risk of incomplete relief of symptoms possibly requiring revision surgery in the future. Furthermore, although rare, there are risks of major vessel injury such as to the major vessels anteriorly or to the bowel from the surgery.  Sometimes this can happen if an instrument is passed anteriorly through the disc space. There is a risk of nonunion which might require revision surgery in the future, and risk of hardware complications from the instrumentation.  I do use imaging to help guide the placement of the instrumentation, but even with this there is a chance of implant malposition or problem.  There is a risk of blood clots in the legs or the lungs.  Lastly, although rare, there are certainly risks of the anesthetic including stroke heart attack and death.      I explained that we used Stealth navigation with an O-Arm to place the instrumentation.  This is a form of CT-guided navigation for the screws.  We take an intraoperative CT scan which provides an accurate view of the bony anatomy and we then place the screws using the imaging guidance and then take a second CT scan to help verify the screw position. So the benefit of this technology is a  high accuracy for screw placement.    She does have a degenerative scoliosis but is really not a back pain problem it is really just the leg pain that bothers her and so she and I both agreed that if she did have surgery we would not try to do an extensive scoliosis corrective procedure.  She thinks she might want to have surgery in March after an upcoming trip.  I am going to try and arrange an anesthesia clinic visit for her and we will plan to see her back with the imaging studies to finalize things.  In the meantime she will get started again with her therapy and will have the injection done for some short-term pain relief.    Respectfully,  Lane Ventura MD  Spine Surgery  [facility]      Answers for HPI/ROS submitted by the patient on 2/5/2019   General Symptoms: No  Skin Symptoms: No  HENT Symptoms: No  EYE SYMPTOMS: No  HEART SYMPTOMS: No  LUNG SYMPTOMS: No  INTESTINAL SYMPTOMS: No  URINARY SYMPTOMS: No  GYNECOLOGIC SYMPTOMS: No  BREAST SYMPTOMS: No  SKELETAL SYMPTOMS: Yes  BLOOD SYMPTOMS: No  NERVOUS SYSTEM SYMPTOMS: No  MENTAL HEALTH SYMPTOMS: No  Back pain: Yes  Muscle aches: Yes  Neck pain: Yes  Swollen joints: No  Joint pain: Yes  Bone pain: Yes  Muscle cramps: No  Muscle weakness: Yes  Joint stiffness: Yes  Bone fracture: No

## 2019-02-05 NOTE — NURSING NOTE
Teaching Flowsheet   Relevant Diagnosis: lumbar spondylolisthesis  Teaching Topic: preop L3-S1 decompression, L5-S1 TLIF    Leeanne lives with her  in [place].  MRSA swab was sent to lab, and pt will have endocrine labs when here for PAC.  CT to be done today, MRI before her next visit.  Vitamin supplement recommendations Rxs given, Lumbar P.T and COOKIE locations were provided.  Pt was given numbers to call for setting up her MRI, NATALYA, and [facility] for scheduling surgery.     Person(s) involved in teaching:   Patient     Motivation Level:  Asks Questions: Yes  Eager to Learn: Yes  Cooperative: Yes  Receptive (willing/able to accept information): Yes  Any cultural factors/Mormonism beliefs that may influence understanding or compliance? No  Comments:      Patient demonstrates understanding of the following:  Reason for the appointment, diagnosis and treatment plan: Yes  Knowledge of proper use of medications and conditions for which they are ordered (with special attention to potential side effects or drug interactions): Yes  Which situations necessitate calling provider and whom to contact: Yes       Teaching Concerns Addressed:   Comments:      Proper use and care of medications (medical equip, care aids, etc.): Yes  Nutritional needs and diet plan: Yes  Pain management techniques: Yes  Wound Care: Yes  How and/when to access community resources: NA     Instructional Materials Used/Given: preop pkt, antiseptic soap     Time spent with patient: 30 minutes.

## 2019-02-07 ENCOUNTER — DOCUMENTATION ONLY (OUTPATIENT)
Dept: ORTHOPEDICS | Facility: CLINIC | Age: 69
End: 2019-02-07

## 2019-02-07 NOTE — PROGRESS NOTES
Patient is scheduled for surgery with Dr. Ventura       Spoke or left message with: patient via phone call     Date of Surgery: 3/27/19     Location: [place]     Post-ops Made: 6 wks with provider     Pre-op with surgeon (if applicable): yes 3/12/19     H&P: Scheduled with PAC 3/12/19     Additional imaging/appointments: MRI done on 3/12/19     Surgery packet: given in clinic     Additional comments: n/a

## 2019-02-12 ENCOUNTER — THERAPY VISIT (OUTPATIENT)
Dept: PHYSICAL THERAPY | Facility: CLINIC | Age: 69
End: 2019-02-12
Attending: ORTHOPAEDIC SURGERY
Payer: COMMERCIAL

## 2019-02-12 DIAGNOSIS — M54.16 LUMBAR RADICULOPATHY: Primary | ICD-10-CM

## 2019-02-12 DIAGNOSIS — M43.10 ACQUIRED SPONDYLOLISTHESIS: ICD-10-CM

## 2019-02-12 PROCEDURE — 97530 THERAPEUTIC ACTIVITIES: CPT | Mod: GP | Performed by: PHYSICAL THERAPIST

## 2019-02-12 PROCEDURE — 97161 PT EVAL LOW COMPLEX 20 MIN: CPT | Mod: GP | Performed by: PHYSICAL THERAPIST

## 2019-02-12 PROCEDURE — 97110 THERAPEUTIC EXERCISES: CPT | Mod: GP | Performed by: PHYSICAL THERAPIST

## 2019-02-12 NOTE — PROGRESS NOTES
[facility] Initial Evaluation -- Lumbar    Date: February 12, 2019  Leeanne Duarte is a 68 year old female with a Lumbar condition.   Referral: Ortho  Work mechanical stresses:  Retired  Employment status:  na  Leisure mechanical stresses: not a regular exerciser - use to walk 3 x/week before pain started to worsen.  Functional disability score (BEENA/STarT Back):  See flowsheets  VAS score (0-10): 4/10,  Ranges 0-10/10  Patient goals/expectations:  Exer instr before surgery    HISTORY:    Present symptoms: Int pain bilat LB - aching, Int pain Rt post lat Hip sharp, deep bone pain, Int numbness Post lat thigh, ant lat distal thigh and lower leg to top of Rt foot  Pain quality (sharp/shooting/stabbing/aching/burning/cramping):  see above   Paresthesia (yes/no):  See above    Present since (onset date): Recent exacerbation about 1 month ago.  MD casiano 2-5-2019.     Symptoms (improving/unchanging/worsening):  unchnging.     Symptoms commenced as a result of: unknown   Condition occurred in the following environment:   unknown     Symptoms at onset (back/thigh/leg): Back then moved into the hip  Constant symptoms (back/thigh/leg): none  Intermittent symptoms (back/thigh/leg): back, hip and thigh and lower leg/foot    Symptoms are made worse with the following: Always Sitting 30 min tolerance for LB, Always Rising LB, Always Standing 5-10 min Rt hip and leg and LB, Always Walking kash varies 5-10 min and some days no pain at all, Time of day - Always as the day progresses and Always PM and Always On the move   Symptoms are made better with the following: Always Sitting relieves Rt hip and leg symptoms and Sometimes When still    Disturbed sleep (yes/no):  no Sleeping postures (prone/sup/side R/L): sides    Previous episodes (0/1-5/6-10/11+): 15 yrs Hx of back pain Year of first episode:     Previous history: long hx of back pain and Rt leg numbness  Previous treatments: PT several - has not been  doing any of the exer in the past 4-5 months.      Specific Questions:  Cough/Sneeze/Strain (pos/neg): neg  Bowel/Bladder (normal/abnormal): normal  Gait (normal/abnormal): normal  Medications (nil/NSAIDS/analg/steroids/anticoag/other):  NSAIDS and Other - Bone densisty, Sleep and Anti-depressants  Medical allergies:  Chocolate, citrus  General health (excellent/good/fair/poor):  good  Pertinent medical history:  Depression, History of fractures, Osteoarthritis and Osteoporosis  Imaging (None/Xray/MRI/Other):  CT/MRI - See Epic chart  Recent or major surgery (yes/no):  Avulsed HS Lt 8-9 yrs ago  Night pain (yes/no): no  Accidents (yes/no): no  Unexplained weight loss (yes/no): no  Barriers at home: none  Other red flags: none    EXAMINATION    Posture:   Sitting (good/fair/poor): poor  Standing (good/fair/poor):fair - scoliosis convex Rt thoracic  Lordosis (red/acc/normal): decr  Correction of posture (better/worse/no effect): NE    Lateral Shift (right/left/nil): nil  Relevant (yes/no):  na  Other Observations: pt has no trunk or pelvic rotation w/ ambulation - very stiff    Neurological:    Motor deficit:  Rt Ant Tib 4/5, HS 4+/5 w/ buttock pain, Quad 5-/5, Hip flexor 4+/5  Reflexes:  na  Sensory deficit:  na  Dural signs:  Seated knee ext w/ DF - stretching calves bilaterally.    Movement Loss:   Cm Mod Min Nil Pain   Flexion  X   Mild across LB   Extension X    Mod center LB   Side Gliding R   X  LB   Side Gliding L  X   LB radiating into Rt hip     Test Movements:   During: produces, abolishes, increases, decreases, no effect, centralizing, peripheralizing   After: better, worse, no better, no worse, no effect, centralized, peripheralized    Pretest symptoms standing: mild ache Rt LB   Symptoms During Symptoms After ROM increased ROM decreased No Effect   FIS        Rep FIS        EIS Hips against table Increases    No Worse         Rep EIS Increases  Center Rt LB    No Worse      X - incr pain w/ flexion,  "incr strength Rt Ant Tib   Pretest symptoms lying: Pronelying - mild ache Rt Center LB    Symptoms During Symptoms After ROM increased ROM decreased No Effect   ODETTE        Rep ODETTE        EIL        Rep EIL            Static Tests:  Sitting slouched:  incr pain  Sitting erect:  decr pain  Standing slouched na  Standing erect:  na  Lying prone in extension:  1 set 20-25\" holds x 5 - Incr Rt center LB, NW, NE ROM (incr pain w/ flexion initially then NW), incr Ant Tib strength. Long sitting:  na    Other Tests: na    Provisional Classification:  Inconclusive/Other - needs further assessment    Principle of Management:  Education:  Discussed flex vs ext with Osteoporosis, importance of mobility for ADL's, Posture - L-roll, no couch or recliner, centralisation vs peripheralisation, and initiated HEP   Equipment provided:  none  Mechanical therapy (Y/N):  Y???   Extension principle:  EDIS 20\" x5 3-4 x/day and EIS (hips against table/counter) x10 3 x/day.  Lateral Principle:    Flexion principle:    Other:      ASSESSMENT/PLAN:    Patient is a 68 year old female with lumbar complaints.    Patient has the following significant findings with corresponding treatment plan.                Diagnosis 1:  Lumbar Radiculopathy  Pain -  hot/cold therapy, education, directional preference exercise and home program  Decreased ROM/flexibility - manual therapy, therapeutic exercise and home program  Decreased strength - therapeutic exercise, therapeutic activities and home program  Decreased function - therapeutic activities and home program  Impaired posture - neuro re-education and home program    Therapy Evaluation Codes:   1) History comprised of:   Personal factors that impact the plan of care:      Age and Time since onset of symptoms.    Comorbidity factors that impact the plan of care are:      Depression, Osteoarthritis and Hx of compression Fx's and Osteoporosis.     Medications impacting care: Anti-depressant, " Anti-inflammatory, Bone density and Sleep.  2) Examination of Body Systems comprised of:   Body structures and functions that impact the plan of care:      Lumbar spine.   Activity limitations that impact the plan of care are:      Bending, Driving, Dressing, Sitting, Stairs, Standing and Walking.  3) Clinical presentation characteristics are:   Evolving/Changing.  4) Decision-Making    Moderate complexity using standardized patient assessment instrument and/or measureable assessment of functional outcome.  Cumulative Therapy Evaluation is: Moderate complexity.    Previous and current functional limitations:  (See Goal Flow Sheet for this information)    Short term and Long term goals: (See Goal Flow Sheet for this information)     Communication ability:  Patient appears to be able to clearly communicate and understand verbal and written communication and follow directions correctly.  Treatment Explanation - The following has been discussed with the patient:   RX ordered/plan of care  Anticipated outcomes  Possible risks and side effects  This patient would benefit from PT intervention to resume normal activities.   Rehab potential is good.    Frequency:  1 X week, once daily  Duration:  for 6 weeks  Discharge Plan:  Achieve all LTG.  Independent in home treatment program.  Reach maximal therapeutic benefit.    Please refer to the daily flowsheet for treatment today, total treatment time and time spent performing 1:1 timed codes.

## 2019-02-13 ENCOUNTER — TELEPHONE (OUTPATIENT)
Dept: ORTHOPEDICS | Facility: CLINIC | Age: 69
End: 2019-02-13

## 2019-02-13 PROBLEM — M54.16 LUMBAR RADICULOPATHY: Status: ACTIVE | Noted: 2019-02-13

## 2019-02-23 NOTE — PROGRESS NOTES
Spine Surgery Consultation    REFERRING PHYSICIAN: Referred Self   PRIMARY CARE PHYSICIAN: Robert Montemayor           Chief Complaint:   Consult (low back pain )      History of Present Illness:  Symptom Profile Including: location of symptoms, onset, severity, exacerbating/alleviating factors, previous treatments:        Leeanne Duarte is a 68 year old female who presents for evaluation of predominantly right buttock and leg symptoms in an L5 and S1 distribution.  She has a modest amount of back pain but she says this is tolerable and what really bothers her is the leg pain.  It is worse in the buttock and leg and worse when she is up and walking and improves somewhat with rest.  She is been dealing with this for a number of years.  She tried gabapentin at one point and it made her totally dizzy and she could not tolerate it.  She did several rounds of physical therapy in 2016 2017 and she is continued the exercises at home and she thinks they are no longer effective.  She is been doing yoga pretty regularly.  She had a series of epidural injections one in 2016 and then again in 2017.  The 2016 injection was a right L5-S1 transforaminal epidural and she says that helped her feel amazing for several weeks but she did not want to continue it because she has osteoporosis and she was worried about the effect of bone health of the steroids.    Over the last year she thinks things have been worsening.  She has a past bad experience with an infection after a hamstring reconstruction and it was quite a lot to go through so she has been hesitant to consider surgery but she thinks things are getting bad enough that she might want to do it now.         Past Medical History:   No past medical history on file.         Past Surgical History:     Past Surgical History:   Procedure Laterality Date     CATARACT IOL, RT/LT  2014            Social History:     Social History     Tobacco Use     Smoking status:  "Never Smoker     Smokeless tobacco: Never Used   Substance Use Topics     Alcohol use: Yes     Comment: 7 per week            Family History:     Family History   Problem Relation Age of Onset     High cholesterol Mother      Hypertension Mother      Myocardial Infarction Mother 72     High cholesterol Father      Hypertension Father      Myocardial Infarction Father 76     Colon Cancer Maternal Grandmother      Colon Cancer Maternal Grandfather             Allergies:     Allergies   Allergen Reactions     Chocolate      Orange             Medications:     Current Outpatient Medications   Medication     Ascorbic Acid (VITAMIN C) 500 MG CAPS     calcium carbonate (OS-LIVE) 500 MG tablet     Calcium Citrate-Vitamin D (CALCIUM CITRATE + D) 300-100 MG-UNIT TABS     IBUPROFEN PO     LORAZEPAM PO     vitamin D3 (CHOLECALCIFEROL) 2000 units tablet     escitalopram (LEXAPRO) 10 MG tablet     estrogens, conjugated, (PREMARIN) 0.3 MG tablet     FLUAD 0.5 ML TALI     No current facility-administered medications for this visit.              Review of Systems:     A 10 point ROS was performed and reviewed. Specific responses to these questions are noted at the end of the document.         Physical Exam:   Vitals: Ht 1.473 m (4' 10\")   Wt 43.1 kg (95 lb)   BMI 19.86 kg/m    Constitutional: awake, alert, cooperative, no apparent distress, appears stated age.    Eyes: The sclera are white.  Ears, Nose, Throat: The trachea is midline.  Psychiatric: The patient has a normal affect.  Respiratory: breathing non-labored  Cardiovascular: The extremities are warm and perfused.  Skin: no obvious rashes or lesions.  Musculoskeletal, Neurologic, and Spine:          Lumbar Spine:    Appearance - No gross stepoffs or deformities    Motor -     L2-3: Hip flexion 5/5 R and 5/5 L strength          L3/4:  Knee extension R 5/5 and L 5/5 strength         L4/5:  Foot dorsiflexion R 5/5 L 5/5 and       EHL dorsiflexion R 4/5 L 4/5 strength       "   S1:  Plantarflexion/Peroneal Muscles  R 5/5 and L 5/5 strength    Sensation: intact to light touch L3-S1 distribution BLE      Neurologic:      REFLEXES Left Right                  Patella 1+ 1+   Ankle jerk 1+ 1+   Babinski No upgoing great toe No upgoing great toe   Clonus 0 beats 0 beats     Hip Exam:  No pain with hip log roll and no tenderness over the greater trochanters.    Alignment:  Patient stands with a neutral standing sagittal balance.           Imaging:   We ordered and independently reviewed new radiographs at this clinic visit. The results were discussed with the patient.  Findings include:    May 5, 2016 lumbar MRI shows severe multilevel degenerative spondylosis with moderate to severe lateral recess stenosis L4-5 due to trace anterolisthesis and facet hypertrophy as well as severe right greater than left foraminal stenosis L5-S1 due to broad-based disc bulge and severe spondylosis moderate spondylosis and mild to moderate stenosis at L3-4 with significant disc space height loss and disc osteophyte complex    Standing scoliosis radiographs February 5, 2019 show an apex at L2-3 with a 27 degree lumbar curvature.  Multilevel degenerative changes.             Assessment and Plan:   Assessment:  68 year old female with degenerative scoliosis and foraminal stenosis L5-S1 with central stenosis L3-4 and L4-5.     Plan:  1. I am going to refer her for another round of lumbar physical therapy and a repeat right-sided L5-S1 transforaminal epidural injection since she previously did well with that injection in the past.  2. I am going to update her lumbar CT and MRI scan since the previous scan is 3 years old.    If she did need surgery, based on her previous imaging studies I would likely recommend an L3-S1 decompression with an L5-S1 fusion to address the foraminal stenosis at L5-S1 and the central stenosis from L3-S1.    Risks of this surgery include risk of infection, risk of dural tear resulting in CSF  leak which might result in headaches, or possible need for lumbar drain, or possible revision surgery in the setting of a persistent leak. Risk of hematoma or seroma resulting in wound complications.  Possible nerve root injury resulting in numbness weakness or paralysis into the arms or legs. Possible radiculitis which could result in similar symptoms or could result in significant neurogenic type pain. There is also a risk of delayed onset nerve root pals or radiculitis, which I explained is potentially due to stretch injury or possibly due to delayed onset swelling, and is sometimes temporary but can also be permanent.  Risk of incomplete decompression which might require revision surgery in the future.  Risk of adjacent segment problems requiring surgery in the future. Risk of incomplete relief of symptoms possibly requiring revision surgery in the future. Furthermore, although rare, there are risks of major vessel injury such as to the major vessels anteriorly or to the bowel from the surgery.  Sometimes this can happen if an instrument is passed anteriorly through the disc space. There is a risk of nonunion which might require revision surgery in the future, and risk of hardware complications from the instrumentation.  I do use imaging to help guide the placement of the instrumentation, but even with this there is a chance of implant malposition or problem.  There is a risk of blood clots in the legs or the lungs.  Lastly, although rare, there are certainly risks of the anesthetic including stroke heart attack and death.      I explained that we used Stealth navigation with an O-Arm to place the instrumentation.  This is a form of CT-guided navigation for the screws.  We take an intraoperative CT scan which provides an accurate view of the bony anatomy and we then place the screws using the imaging guidance and then take a second CT scan to help verify the screw position. So the benefit of this technology is a  high accuracy for screw placement.    She does have a degenerative scoliosis but is really not a back pain problem it is really just the leg pain that bothers her and so she and I both agreed that if she did have surgery we would not try to do an extensive scoliosis corrective procedure.  She thinks she might want to have surgery in March after an upcoming trip.  I am going to try and arrange an anesthesia clinic visit for her and we will plan to see her back with the imaging studies to finalize things.  In the meantime she will get started again with her therapy and will have the injection done for some short-term pain relief.    ADDENDUM: I reviewed her updated lumbar CT scan completed in February after this clininc visit.  It shows severe spondylosis L3-S1, with lateral listhesis of L3 on L4, Anterolisthesis of L4-5, and asymmetric widening of the L4-5 facet joints, in addition to the formainla stenosis at L5-S1 noted on the MRI.  Based on this, I am revising the recommendation to an L3-Pelvis type fusion with L3-S1 TLIF and SPO, use of BMP.  The apex of her deformity is the L2-3 disc space, so I considered L2 as well, but the L2-3 disc looks healthy and the L2-3 facets look healthy as well.  I think that regardless of the levels chosen one of the key risks for her given her diffuse spondylosis is the risk of adjacent segment disease.  I will discuss these recommendations with the patient.        Respectfully,  Lane Ventura MD  Spine Surgery  [facility]      Answers for HPI/ROS submitted by the patient on 2/5/2019   General Symptoms: No  Skin Symptoms: No  HENT Symptoms: No  EYE SYMPTOMS: No  HEART SYMPTOMS: No  LUNG SYMPTOMS: No  INTESTINAL SYMPTOMS: No  URINARY SYMPTOMS: No  GYNECOLOGIC SYMPTOMS: No  BREAST SYMPTOMS: No  SKELETAL SYMPTOMS: Yes  BLOOD SYMPTOMS: No  NERVOUS SYSTEM SYMPTOMS: No  MENTAL HEALTH SYMPTOMS: No  Back pain: Yes  Muscle aches: Yes  Neck pain: Yes  Swollen joints:  No  Joint pain: Yes  Bone pain: Yes  Muscle cramps: No  Muscle weakness: Yes  Joint stiffness: Yes  Bone fracture: No

## 2019-03-05 ENCOUNTER — TELEPHONE (OUTPATIENT)
Dept: ORTHOPEDICS | Facility: CLINIC | Age: 69
End: 2019-03-05

## 2019-03-05 DIAGNOSIS — Z22.321 STAPHYLOCOCCUS AUREUS CARRIER: Primary | ICD-10-CM

## 2019-03-05 RX ORDER — MUPIROCIN 20 MG/G
OINTMENT TOPICAL 2 TIMES DAILY
Qty: 30 G | Refills: 0 | Status: SHIPPED | OUTPATIENT
Start: 2019-03-05 | End: 2019-03-12

## 2019-03-05 NOTE — TELEPHONE ENCOUNTER
Leeanne is scheduled for L3-S1 TLIF on 4/3/19 with Dr Ventura.  Pt underwent nasal swab screen for MRSA on 2/5/19.     MRSA negative.   SA positive.  Per Dr Ventura protocol, pt will undergo 5 days of chlorhexidine showers and Bactroban nasal ointment prior to surgery starting 3/29/19.  Pt ws left voicemail to call RN directly so we can review the plan.  Dori Penaloza RN    Pt phoned back and was given the preop protocol.  She verbalized understanding and agreement with the plan.  Dori Penaloza RN

## 2019-03-07 PROBLEM — E87.1 HYPONATREMIA: Status: ACTIVE | Noted: 2017-05-16

## 2019-03-07 PROBLEM — D12.6 TUBULAR ADENOMA OF COLON: Status: ACTIVE | Noted: 2017-05-03

## 2019-03-07 PROBLEM — F33.9 MAJOR DEPRESSION, RECURRENT (H): Status: ACTIVE | Noted: 2017-05-03

## 2019-03-07 PROBLEM — S22.089A: Status: ACTIVE | Noted: 2017-06-27

## 2019-03-12 ENCOUNTER — OFFICE VISIT (OUTPATIENT)
Dept: SURGERY | Facility: CLINIC | Age: 69
End: 2019-03-12
Payer: COMMERCIAL

## 2019-03-12 ENCOUNTER — ANCILLARY PROCEDURE (OUTPATIENT)
Dept: MRI IMAGING | Facility: CLINIC | Age: 69
End: 2019-03-12
Attending: ORTHOPAEDIC SURGERY
Payer: COMMERCIAL

## 2019-03-12 ENCOUNTER — ANESTHESIA EVENT (OUTPATIENT)
Dept: SURGERY | Facility: CLINIC | Age: 69
End: 2019-03-12

## 2019-03-12 ENCOUNTER — OFFICE VISIT (OUTPATIENT)
Dept: ORTHOPEDICS | Facility: CLINIC | Age: 69
End: 2019-03-12
Payer: COMMERCIAL

## 2019-03-12 ENCOUNTER — RECORDS - HEALTHEAST (OUTPATIENT)
Dept: ADMINISTRATIVE | Facility: OTHER | Age: 69
End: 2019-03-12

## 2019-03-12 VITALS
HEART RATE: 71 BPM | SYSTOLIC BLOOD PRESSURE: 108 MMHG | TEMPERATURE: 98.1 F | OXYGEN SATURATION: 99 % | WEIGHT: 102.3 LBS | BODY MASS INDEX: 21.47 KG/M2 | DIASTOLIC BLOOD PRESSURE: 57 MMHG | HEIGHT: 58 IN

## 2019-03-12 DIAGNOSIS — M81.8 OTHER OSTEOPOROSIS, UNSPECIFIED PATHOLOGICAL FRACTURE PRESENCE: ICD-10-CM

## 2019-03-12 DIAGNOSIS — M41.50 DEGENERATIVE SCOLIOSIS IN ADULT PATIENT: ICD-10-CM

## 2019-03-12 DIAGNOSIS — Z01.818 PRE-OPERATIVE GENERAL PHYSICAL EXAMINATION: ICD-10-CM

## 2019-03-12 DIAGNOSIS — G89.29 CHRONIC RIGHT-SIDED LOW BACK PAIN WITH RIGHT-SIDED SCIATICA: Primary | ICD-10-CM

## 2019-03-12 DIAGNOSIS — M54.41 CHRONIC RIGHT-SIDED LOW BACK PAIN WITH RIGHT-SIDED SCIATICA: Primary | ICD-10-CM

## 2019-03-12 DIAGNOSIS — Z01.818 PRE-OPERATIVE GENERAL PHYSICAL EXAMINATION: Primary | ICD-10-CM

## 2019-03-12 LAB
ANION GAP SERPL CALCULATED.3IONS-SCNC: 4 MMOL/L (ref 3–14)
BUN SERPL-MCNC: 6 MG/DL (ref 7–30)
CALCIUM SERPL-MCNC: 8.6 MG/DL (ref 8.5–10.1)
CHLORIDE SERPL-SCNC: 99 MMOL/L (ref 94–109)
CO2 SERPL-SCNC: 28 MMOL/L (ref 20–32)
CREAT SERPL-MCNC: 0.73 MG/DL (ref 0.52–1.04)
ERYTHROCYTE [DISTWIDTH] IN BLOOD BY AUTOMATED COUNT: 16.6 % (ref 10–15)
GFR SERPL CREATININE-BSD FRML MDRD: 84 ML/MIN/{1.73_M2}
GLUCOSE SERPL-MCNC: 78 MG/DL (ref 70–99)
HCT VFR BLD AUTO: 31 % (ref 35–47)
HGB BLD-MCNC: 9.8 G/DL (ref 11.7–15.7)
MCH RBC QN AUTO: 23.4 PG (ref 26.5–33)
MCHC RBC AUTO-ENTMCNC: 31.6 G/DL (ref 31.5–36.5)
MCV RBC AUTO: 74 FL (ref 78–100)
PLATELET # BLD AUTO: 314 10E9/L (ref 150–450)
POTASSIUM SERPL-SCNC: 4.7 MMOL/L (ref 3.4–5.3)
RBC # BLD AUTO: 4.19 10E12/L (ref 3.8–5.2)
SODIUM SERPL-SCNC: 131 MMOL/L (ref 133–144)
WBC # BLD AUTO: 4.7 10E9/L (ref 4–11)

## 2019-03-12 ASSESSMENT — MIFFLIN-ST. JEOR: SCORE: 878.78

## 2019-03-12 NOTE — ANESTHESIA PREPROCEDURE EVALUATION
Anesthesia Pre-Procedure Evaluation    Patient: Leeanne Duarte   MRN:     [id number] Gender:   female   Age:    68 year old :      1950        Preoperative Diagnosis: * No surgery found *        No past medical history on file.   Past Surgical History:   Procedure Laterality Date     CATARACT IOL, RT/LT            Anesthesia Evaluation     . Pt has had prior anesthetic. Type: General, MAC and Regional           ROS/MED HX    ENT/Pulmonary:  - neg pulmonary ROS     Neurologic:     (+)neuropathy - left toe,     Cardiovascular:     (+) Dyslipidemia, ----. : . . . :. .       METS/Exercise Tolerance:  3 - Able to walk 1-2 blocks without stopping   Hematologic:  - neg hematologic  ROS       Musculoskeletal:   (+) , , other musculoskeletal- Scoliosis; spondylolisthesis, wedge fractures, osteoporosis      GI/Hepatic:  - neg GI/hepatic ROS       Renal/Genitourinary:     (+) Nephrolithiasis , Other Renal/ Genitourinary, stone surgery      Endo:  - neg endo ROS       Psychiatric:     (+) psychiatric history anxiety and depression      Infectious Disease:  - neg infectious disease ROS       Malignancy:      - no malignancy   Other:    (+) No chance of pregnancy H/O Chronic Pain,no other significant disability                        PHYSICAL EXAM:   Mental Status/Neuro: A/A/O   Airway: Facies: Feasible  Mallampati: I  Mouth/Opening: Full  TM distance: > 6 cm  Neck ROM: Full   Respiratory:   Resp. Rate: Normal     Resp. Effort: Normal      CV: Rhythm: Regular  Rate: Age appropriate  Heart: Normal Sounds  CV Other: unable to feel left pedal pulses - foot warm and pink   Comments:                    Lab Results   Component Value Date    WBC 4.4 2016    HGB 12.0 2016    HCT 35.0 2016     2016    CRP 6.8 10/07/2009    SED 25 2009     2016    POTASSIUM 2.7 (L) 2016    CHLORIDE 104 2016    CO2 32 2016    BUN 8 2016    CR 0.94 10/04/2016    GLC  "87 07/11/2016    LIVE 9.9 10/04/2016    PHOS 1.5 (L) 10/27/2010    MAG 1.9 07/11/2016    ALBUMIN 3.0 (L) 07/11/2016    PROTTOTAL 6.8 07/11/2016    ALT 17 07/11/2016    AST 22 07/11/2016    ALKPHOS 68 07/11/2016    BILITOTAL 0.3 07/11/2016    LIPASE 243 10/24/2010    PTT 30 10/07/2009    INR 1.04 10/25/2010    TSH 5.34 (H) 07/11/2016    T4 0.77 03/12/2010    T3 151 03/12/2010       Preop Vitals  BP Readings from Last 3 Encounters:   03/12/19 108/57   10/04/16 101/54   05/23/16 106/63    Pulse Readings from Last 3 Encounters:   03/12/19 71   10/04/16 61   05/23/16 65      Resp Readings from Last 3 Encounters:   10/04/16 16   05/23/16 16   03/21/16 18    SpO2 Readings from Last 3 Encounters:   03/12/19 99%   10/04/16 98%   05/23/16 96%      Temp Readings from Last 1 Encounters:   03/12/19 98.1  F (36.7  C) (Oral)    Ht Readings from Last 1 Encounters:   03/12/19 1.473 m (4' 10\")      Wt Readings from Last 1 Encounters:   03/12/19 46.4 kg (102 lb 4.8 oz)    Estimated body mass index is 21.38 kg/m  as calculated from the following:    Height as of this encounter: 1.473 m (4' 10\").    Weight as of this encounter: 46.4 kg (102 lb 4.8 oz).     LDA:            JZG FV AN PLAN NO PONV RULE         PAC Discussion and Assessment    ASA Classification: 2  Case is suitable for:   Anesthetic techniques and relevant risks discussed: GA  Invasive monitoring and risk discussed: Yes  Types:   Possibility and Risk of blood transfusion discussed: Yes  NPO instructions given:   Additional anesthetic preparation and risks discussed:   Needs early admission to pre-op area:   Other:     PAC Resident/NP Anesthesia Assessment:  68 year old female for L3-S1 transforaminal interbody fusion, Kaminski Maxwell osteotomy fusion, L3-pelvis, with Dr. Lane Ventura, on 4/3/19, in treatment of spondylolisthesis. PAC referral for risk assessment and optimization for anesthesia with comorbid conditions of mild lupus ds, Raynaud's, depression, scoliosis " and osteoporosis.  Pre-operative considerations include:  1.) CV: Cardiac risks-no identifiable. 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   RCRI = 0 reflecting 0.4% risk of major adverse cardiac event  Discussed with Dr. Montalvo. No further cardiac evaluation indicated  2.) PUlmonary: Never smoker  BLAKE risk = 1/8 = low risk  3.) Heme: No recent HGB  CBC, type and screen  VTE risk is slightly elevated due to age  4.) GI: PONV score = 2 ( 2 or > antiemetic prop(hylaxis recommended)  5.) MSK: wedge fracture thoracic vertebra -care with positioning.    Anesthesia: No prior problems  Discussed with Dr. Montalvo.        Reviewed and Signed by PAC Mid-Level Provider/Resident  Mid-Level Provider/Resident: Cailin Foster PA-C  Date: 3/12/19  Time: 12:53 PM    Attending Anesthesiologist Anesthesia Assessment:  68 year old for L3-S1 interbody fusion, osteotomy, etc, in management of spondylolisthesis. Patient has no significant cardiac or pulmonary disease known.     Patient/case discussed with LALITA/resident; agree with above assessment. No need to see patient. Patient is appropriate for the planned procedure without further work-up or medical management.      Reviewed and Signed by PAC Anesthesiologist  Anesthesiologist: [email or web address]  Date: 3/12/2019  Time:   Pass/Fail: Pass  Disposition:     PAC Pharmacist Assessment:        Pharmacist:   Date:   Time:        MARILYNN Larsen

## 2019-03-12 NOTE — H&P
Pre-Operative H & P     CC:  Preoperative exam to assess for increased cardiopulmonary risk while undergoing surgery and anesthesia.    Date of Encounter: 3/12/2019  Primary Care Physician:  Renée Horne  Leeanne Duarte is a 68 year old female who presents for pre-operative H & P in preparation for L3-S1 transforaminal interbody fusion, Kaminski Maxwell osteotomy fusion, L3-pelvis, with Dr. Lane Ventura, on 4/3/19, at [facility], in treatment of spondylolisthesis.  SHe has had back pain for 15 years, with recent increase past 1 month. She c/o Bilateral low back, right hip, numbness posterior lateral thigh and anterior lateral distal thigh lower leg to top foot.   She has a history of osteoporosis, spondylolisthesis.       History is obtained from the patient.     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 Patient is postmenopausal.    Steroid use in the last year: no    Personal or FH with difficulty with Anesthesia:  no    Prior to Admission Medications  Current Outpatient Medications   Medication Sig Dispense Refill     Ascorbic Acid (VITAMIN C) 500 MG CAPS Take  by mouth daily.       Calcium Citrate-Vitamin D (CALCIUM CITRATE + D) 300-100 MG-UNIT TABS Take by mouth 3 times daily Take 2 tablets in the AM and 2 tablets in the PM. Vit D 400 IU, Calcium 500 mg       escitalopram (LEXAPRO) 10 MG tablet take 1 tablet every morning  3     IBUPROFEN PO Take by mouth as needed for moderate pain       LORAZEPAM PO Take 0.5 mg by mouth 2 times daily as  needed for anxiety       FLUAD 0.5 ML TALI ADM 0.5ML IM UTD  0       Allergies  Allergies   Allergen Reactions     Chocolate      Orange        Social History  Social History     Socioeconomic History     Marital status:      Spouse name: Not on file     Number of children: 2 healthy   Social Needs     Financial resource strain: Not on file     Food insecurity:     Worry: Not on file     Inability: Not on file     Transportation needs:     Medical: Not on file     Non-medical: Not on file   Tobacco Use     Smoking status: Never Smoker     Smokeless tobacco: Never Used   Substance and Sexual Activity     Alcohol use: Yes     Comment: 7 per week     Drug use: No     Sexual activity: No     Family History  Family History   Problem Relation Age of Onset     High cholesterol Mother      Hypertension Mother      Myocardial Infarction Mother 72     High cholesterol Father      Hypertension Father      Myocardial Infarction Father 76     Colon Cancer Maternal Grandmother      Colon Cancer Maternal Grandfather      Anesthesia Evaluation  Pt has had prior anesthetic. Type: General, MAC and Regional       ROS/MED HX    BLAKE risks: age = 1/8 = low risk  ENT/Pulmonary:  - neg pulmonary ROS     Neurologic:     (+)neuropathy - left toe,     Cardiovascular:     (+) Dyslipidemia     METS/Exercise Tolerance:  3 - Able to walk 1-2 blocks without stopping   Hematologic:  - neg hematologic  ROS       Musculoskeletal:   (+) , , other musculoskeletal- Scoliosis; spondylolisthesis, wedge fractures, osteoporosis   Lupus   GI/Hepatic:  - neg GI/hepatic ROS       Renal/Genitourinary:     (+) Nephrolithiasis , Other Renal/ Genitourinary, stone surgery      Endo:  - neg endo ROS       Psychiatric:     (+) psychiatric history anxiety and depression      Infectious Disease:  - neg infectious disease ROS       Malignancy:      - no malignancy   Other:    (+) No chance of pregnancy H/O Chronic Pain,no other significant disability          The  "complete review of systems is negative other than noted in the HPI or here.   Temp: 98.1  F (36.7  C) Temp src: Oral BP: 108/57 Pulse: 71     SpO2: 99 %         102 lbs 4.8 oz  4' 10\"   Body mass index is 21.38 kg/m .       Physical Exam  Constitutional: Awake, alert, cooperative, no apparent distress, and appears stated age.  Eyes: Pupils equal, round and reactive to light, extra ocular muscles intact, sclera clear, conjunctiva normal.  HENT: Normocephalic, oral pharynx with moist mucus membranes, good dentition. Round, small but easily palpable mobile mass felt under right mid mandibular area, into cervical chain, able to feel borders, nontender-No similar finding on left side. No goiter appreciated.   Respiratory: Clear to auscultation bilaterally, no crackles or wheezing.  Cardiovascular: Regular rate and rhythm, normal S1 and S2, and no murmur noted.  Carotids +2, no bruits. No LE edema. Palpable pulses to radial  And right PT arteries, difficult to feel left pedal arteries.   GI: Normal bowel sounds, soft, non-distended, non-tender, no masses palpated, no hepatosplenomegaly.    Lymph/Hematologic: No cervical lymphadenopathy and no supraclavicular lymphadenopathy.  Genitourinary:  deferred  Skin: Warm and dry.  No rashes at anticipated surgical site.   Musculoskeletal: Full ROM of neck. There is no redness, warmth, or swelling of the joints. Gross motor strength is normal.    Neurologic: Awake, alert, oriented to name, place and time. Cranial nerves II-XII are grossly intact. Gait is normal.   Neuropsychiatric: Calm, cooperative. Normal affect.     LABS: personally reviewed      ASSESSMENT and PLAN  Leeanne Duarte is a 68 year old female scheduled to undergo L3-S1 transforaminal interbody fusion, Kaminski Maxwell osteotomy fusion, L3-pelvis, with Dr. Lane Ventura, on 4/3/19, in treatment of spondylolisthesis.    Pre-operative considerations include:  1.) CV: Cardiac risks-no identifiable. 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   RCRI = 0 reflecting 0.4% risk of major adverse cardiac event  Discussed with Dr. Montalvo. No further cardiac evaluation indicated    2.) PUlmonary: Never smoker  BLAKE risk = 1/8 = low risk    3.) Heme: No recent HGB  CBC, type and screen  VTE risk is slightly elevated due to age    4.) GI: PONV score = 2 ( 2 or > antiemetic prop(hylaxis recommended)    5.) MSK: wedge fracture thoracic vertebra -care with positioning.      Patient was discussed with Dr Montalvo.Patient is optimized and is acceptable candidate for the proposed procedure.  No further diagnostic evaluation is needed.     MARILYNN Larsen  Preoperative Assessment Center  [facility]  [facility]  Phone: [phone]  Fax: [phone]

## 2019-03-12 NOTE — NURSING NOTE
Reason For Visit:   Chief Complaint   Patient presents with     RECHECK     PAC review and discuss surgery        There were no vitals taken for this visit.         Randell Esteves ATC

## 2019-03-12 NOTE — LETTER
3/12/2019       RE: Leeanne Duarte  [id number]  [place] [address]     Dear Colleague,    Thank you for referring your patient, Leeanne Duarte, [facility] Please see a copy of my visit note below.    Spine Surgery Return Clinic Visit      Chief Complaint:   RECHECK (PAC review and discuss surgery )      Interval HPI:  Symptom Profile Including: location of symptoms, onset, severity, exacerbating/alleviating factors, previous treatments:        Leeanne Duarte is a 68 year old female who presents for evaluation of predominantly right buttock and leg symptoms in an L5 and S1 distribution.  She has a modest amount of back pain but she says this is tolerable and what really bothers her is the leg pain.  It is worse in the buttock and leg. Her symptoms are worse when she is up and walking; improves somewhat with rest.  She is been dealing with this for a number of years.  She tried gabapentin at one point, it made her dizzy and she could not tolerate it.  She did several rounds of physical therapy in 2016/2017 and she is continued the exercises at home and she thinks they are no longer effective.  She is been doing yoga pretty regularly.  She had a series of epidural injections one in 2016 and then again in 2017.  The 2016 injection was a right L5-S1 transforaminal epidural and she says that helped her feel amazing for several weeks, but she did not want to continue it because she has osteoporosis and she was worried about the effect of bone health of the steroids.     Over the last year she thinks things have been worsening.  She has a past bad experience with an infection after a hamstring reconstruction, and it was quite a lot to go through so she has been hesitant to consider surgery but she thinks things are getting bad enough that she might want to do it now.    I last saw her in February.  At that time I sent her for an  updated CT and MRI scan and she has continued with conservative care.  She returns today for final imaging review and surgical decision making.          Past Medical History:   No past medical history on file.         Past Surgical History:     Past Surgical History:   Procedure Laterality Date     CATARACT IOL, RT/LT  2014            Social History:     Social History     Tobacco Use     Smoking status: Never Smoker     Smokeless tobacco: Never Used   Substance Use Topics     Alcohol use: Yes     Comment: 7 per week            Family History:     Family History   Problem Relation Age of Onset     High cholesterol Mother      Hypertension Mother      Myocardial Infarction Mother 72     High cholesterol Father      Hypertension Father      Myocardial Infarction Father 76     Colon Cancer Maternal Grandmother      Colon Cancer Maternal Grandfather             Allergies:     Allergies   Allergen Reactions     Chocolate      Orange             Medications:     Current Outpatient Medications   Medication     Ascorbic Acid (VITAMIN C) 500 MG CAPS     Calcium Citrate-Vitamin D (CALCIUM CITRATE + D) 300-100 MG-UNIT TABS     escitalopram (LEXAPRO) 10 MG tablet     FLUAD 0.5 ML TALI     IBUPROFEN PO     LORAZEPAM PO     No current facility-administered medications for this visit.              Review of Systems:   A focused musculoskeletal and neurologic ROS was performed with pertinent positives and negatives noted in the HPI.  Additional systems were also reviewed and are documented at the bottom of the note.         Physical Exam:   Vitals: There were no vitals taken for this visit.  Musculoskeletal, Neurologic, and Spine:         Lumbar Spine:                          Appearance - No gross stepoffs or deformities                          Motor -                           L2-3: Hip flexion 5/5 R and 5/5 L strength                           L3/4:  Knee extension R 5/5 and L 5/5 strength                          L4/5:  Foot  dorsiflexion R 5/5 L 5/5 and                                       EHL dorsiflexion R 4/5 L 4/5 strength                          S1:  Plantarflexion/Peroneal Muscles  R 5/5 and L 5/5 strength                          Sensation: intact to light touch L3-S1 distribution BLE                             Neurologic:                            REFLEXES Left Right   Patella 1+ 1+   Ankle jerk 1+ 1+   Babinski No upgoing great toe No upgoing great toe   Clonus 0 beats 0 beats      Hip Exam:  No pain with hip log roll and no tenderness over the greater trochanters.     Alignment:  Patient stands with a neutral standing sagittal balance.          Imaging:   We ordered and independently reviewed new radiographs at this clinic visit. The results were discussed with the patient.  Findings include:     May 5, 2016 lumbar MRI shows severe multilevel degenerative spondylosis with moderate to severe lateral recess stenosis L4-5 due to trace anterolisthesis and facet hypertrophy as well as severe right greater than left foraminal stenosis L5-S1 due to broad-based disc bulge and severe spondylosis moderate spondylosis and mild to moderate stenosis at L3-4 with significant disc space height loss and disc osteophyte complex     Standing scoliosis radiographs February 5, 2019 show an apex at L2-3 with a 27 degree lumbar curvature.  Multilevel degenerative changes.      February 5, 2019 lumbar CT scan shows vacuum disc phenomenon severe spondylosis L3-S1.  The L3-4 facet joints look preserved but L4-5 and L5-S1 are quite destroyed.    March 12, 2019 lumbar MRI shows moderate diffuse spondylosis worst from L3-S1 where there is severe lateral recess stenosis at L3-4 and L4-5 with a grade 1 anterolisthesis of L4 on L5 and severe right greater than left foraminal stenosis at L4-5 and L5-S1.  Chronic appearing T11 compression deformity.           Assessment and Plan:   Assessment:  68 year old female with degenerative scoliosis and foraminal  stenosis L5-S1 with central stenosis L3-4 and L4-5 which has worsened since her 2016 imaging.  She has an old T11 compression fracture.  He also has a grade 1 spondylolisthesis at L4-5 with significant destruction of the L4-5 facet joints.     At the time of her last visit we had discussed a possible L5-S1 fusion with L3-5 decompression.  However since the last visit she was obtained a lumbar CT scan and updated MRI study which shows quite severe spondylosis and lateral listhesis from L4-5 in addition to the known severe foraminal stenosis at L5-S1.  Based on this I think if we do proceed with surgery we would need to extend the fusion from L4 to the pelvis, in addition to an L3-4 central decompression to address the stenosis there.    Risks of this surgery include risk of infection, risk of dural tear resulting in CSF leak which might result in headaches, or possible need for lumbar drain, or possible revision surgery in the setting of a persistent leak. Risk of hematoma or seroma resulting in wound complications.  Possible nerve root injury resulting in numbness weakness or paralysis into the arms or legs. Possible radiculitis which could result in similar symptoms or could result in significant neurogenic type pain. There is also a risk of delayed onset nerve root pals or radiculitis, which I explained is potentially due to stretch injury or possibly due to delayed onset swelling, and is sometimes temporary but can also be permanent.  Risk of incomplete decompression which might require revision surgery in the future.  Risk of adjacent segment problems requiring surgery in the future. Risk of incomplete relief of symptoms possibly requiring revision surgery in the future. Furthermore, although rare, there are risks of major vessel injury such as to the major vessels anteriorly or to the bowel from the surgery.  Sometimes this can happen if an instrument is passed anteriorly through the disc space. There is a risk  of nonunion which might require revision surgery in the future, and risk of hardware complications from the instrumentation.  I do use imaging to help guide the placement of the instrumentation, but even with this there is a chance of implant malposition or problem.  There is a risk of blood clots in the legs or the lungs.  Lastly, although rare, there are certainly risks of the anesthetic including stroke heart attack and death.      I explained that we used Stealth navigation with an O-Arm to place the instrumentation.  This is a form of CT-guided navigation for the screws.  We take an intraoperative CT scan which provides an accurate view of the bony anatomy and we then place the screws using the imaging guidance and then take a second CT scan to help verify the screw position. So the benefit of this technology is a high accuracy for screw placement.    In addition, we discussed the use of bone morphogenic protein. I explained that this is a recombinant protein which is used to aid in bone healing. The main benefit of this protein is a high bony healing rate. However there are also some risks of its use. It is a little bit controversial, but some literature indicates risk of seroma, risk of radiculitis, risk of wound complications, risk of retrograde ejaculation, or even a small increased cancer risk from the inflammatory nature of the protein.  The patient understood the nature of these risks and together we agreed that the benefit of increased bony healing outweighed these risks, and we agreed to proceed with its use.      We talked about the postoperative recovery period, the need for activity avoidance, and the risk of adjacent segment disease or needing to extend her fusion at some point in the future.      She does have a history of a previous thoracic compression fracture.  Based on this, again she and I felt that it would be best to try and avoid extending all the way up and doing a large scoliosis  correction because I think the risk of fracture would just be too high.  Nonetheless I do think it is worth including the L4 level in her fusion because of the severe joint destruction on the CT scan and the fact that there is already a spondylolisthesis there.    She understands and she would like to proceed.  We will try to get things scheduled for her.    Respectfully,  Lane Ventura MD  Spine Surgery  [facility]

## 2019-03-12 NOTE — PROGRESS NOTES
Spine Surgery Return Clinic Visit      Chief Complaint:   RECHECK (PAC review and discuss surgery )      Interval HPI:  Symptom Profile Including: location of symptoms, onset, severity, exacerbating/alleviating factors, previous treatments:        Leeanne Duarte is a 68 year old female who presents for evaluation of predominantly right buttock and leg symptoms in an L5 and S1 distribution.  She has a modest amount of back pain but she says this is tolerable and what really bothers her is the leg pain.  It is worse in the buttock and leg. Her symptoms are worse when she is up and walking; improves somewhat with rest.  She is been dealing with this for a number of years.  She tried gabapentin at one point, it made her dizzy and she could not tolerate it.  She did several rounds of physical therapy in 2016/2017 and she is continued the exercises at home and she thinks they are no longer effective.  She is been doing yoga pretty regularly.  She had a series of epidural injections one in 2016 and then again in 2017.  The 2016 injection was a right L5-S1 transforaminal epidural and she says that helped her feel amazing for several weeks, but she did not want to continue it because she has osteoporosis and she was worried about the effect of bone health of the steroids.     Over the last year she thinks things have been worsening.  She has a past bad experience with an infection after a hamstring reconstruction, and it was quite a lot to go through so she has been hesitant to consider surgery but she thinks things are getting bad enough that she might want to do it now.    I last saw her in February.  At that time I sent her for an updated CT and MRI scan and she has continued with conservative care.  She returns today for final imaging review and surgical decision making.          Past Medical History:   No past medical history on file.         Past Surgical History:     Past Surgical History:   Procedure  Laterality Date     CATARACT IOL, RT/LT  2014            Social History:     Social History     Tobacco Use     Smoking status: Never Smoker     Smokeless tobacco: Never Used   Substance Use Topics     Alcohol use: Yes     Comment: 7 per week            Family History:     Family History   Problem Relation Age of Onset     High cholesterol Mother      Hypertension Mother      Myocardial Infarction Mother 72     High cholesterol Father      Hypertension Father      Myocardial Infarction Father 76     Colon Cancer Maternal Grandmother      Colon Cancer Maternal Grandfather             Allergies:     Allergies   Allergen Reactions     Chocolate      Orange             Medications:     Current Outpatient Medications   Medication     Ascorbic Acid (VITAMIN C) 500 MG CAPS     Calcium Citrate-Vitamin D (CALCIUM CITRATE + D) 300-100 MG-UNIT TABS     escitalopram (LEXAPRO) 10 MG tablet     FLUAD 0.5 ML TALI     IBUPROFEN PO     LORAZEPAM PO     No current facility-administered medications for this visit.              Review of Systems:   A focused musculoskeletal and neurologic ROS was performed with pertinent positives and negatives noted in the HPI.  Additional systems were also reviewed and are documented at the bottom of the note.         Physical Exam:   Vitals: There were no vitals taken for this visit.  Musculoskeletal, Neurologic, and Spine:         Lumbar Spine:                          Appearance - No gross stepoffs or deformities                          Motor -                           L2-3: Hip flexion 5/5 R and 5/5 L strength                           L3/4:  Knee extension R 5/5 and L 5/5 strength                          L4/5:  Foot dorsiflexion R 5/5 L 5/5 and                                       EHL dorsiflexion R 4/5 L 4/5 strength                          S1:  Plantarflexion/Peroneal Muscles  R 5/5 and L 5/5 strength                          Sensation: intact to light touch L3-S1 distribution BLE                              Neurologic:                            REFLEXES Left Right   Patella 1+ 1+   Ankle jerk 1+ 1+   Babinski No upgoing great toe No upgoing great toe   Clonus 0 beats 0 beats      Hip Exam:  No pain with hip log roll and no tenderness over the greater trochanters.     Alignment:  Patient stands with a neutral standing sagittal balance.             Imaging:   We ordered and independently reviewed new radiographs at this clinic visit. The results were discussed with the patient.  Findings include:     May 5, 2016 lumbar MRI shows severe multilevel degenerative spondylosis with moderate to severe lateral recess stenosis L4-5 due to trace anterolisthesis and facet hypertrophy as well as severe right greater than left foraminal stenosis L5-S1 due to broad-based disc bulge and severe spondylosis moderate spondylosis and mild to moderate stenosis at L3-4 with significant disc space height loss and disc osteophyte complex     Standing scoliosis radiographs February 5, 2019 show an apex at L2-3 with a 27 degree lumbar curvature.  Multilevel degenerative changes.      February 5, 2019 lumbar CT scan shows vacuum disc phenomenon severe spondylosis L3-S1.  The L3-4 facet joints look preserved but L4-5 and L5-S1 are quite destroyed.    March 12, 2019 lumbar MRI shows moderate diffuse spondylosis worst from L3-S1 where there is severe lateral recess stenosis at L3-4 and L4-5 with a grade 1 anterolisthesis of L4 on L5 and severe right greater than left foraminal stenosis at L4-5 and L5-S1.  Chronic appearing T11 compression deformity.         Assessment and Plan:   Assessment:  68 year old female with degenerative scoliosis and foraminal stenosis L5-S1 with central stenosis L3-4 and L4-5 which has worsened since her 2016 imaging.  She has an old T11 compression fracture.  He also has a grade 1 spondylolisthesis at L4-5 with significant destruction of the L4-5 facet joints.     At the time of her last visit we had  discussed a possible L5-S1 fusion with L3-5 decompression.  However since the last visit she was obtained a lumbar CT scan and updated MRI study which shows quite severe spondylosis and lateral listhesis from L4-5 in addition to the known severe foraminal stenosis at L5-S1.  Based on this I think if we do proceed with surgery we would need to extend the fusion from L4 to the pelvis, in addition to an L3-4 central decompression to address the stenosis there.    Risks of this surgery include risk of infection, risk of dural tear resulting in CSF leak which might result in headaches, or possible need for lumbar drain, or possible revision surgery in the setting of a persistent leak. Risk of hematoma or seroma resulting in wound complications.  Possible nerve root injury resulting in numbness weakness or paralysis into the arms or legs. Possible radiculitis which could result in similar symptoms or could result in significant neurogenic type pain. There is also a risk of delayed onset nerve root pals or radiculitis, which I explained is potentially due to stretch injury or possibly due to delayed onset swelling, and is sometimes temporary but can also be permanent.  Risk of incomplete decompression which might require revision surgery in the future.  Risk of adjacent segment problems requiring surgery in the future. Risk of incomplete relief of symptoms possibly requiring revision surgery in the future. Furthermore, although rare, there are risks of major vessel injury such as to the major vessels anteriorly or to the bowel from the surgery.  Sometimes this can happen if an instrument is passed anteriorly through the disc space. There is a risk of nonunion which might require revision surgery in the future, and risk of hardware complications from the instrumentation.  I do use imaging to help guide the placement of the instrumentation, but even with this there is a chance of implant malposition or problem.  There is a  risk of blood clots in the legs or the lungs.  Lastly, although rare, there are certainly risks of the anesthetic including stroke heart attack and death.      I explained that we used Stealth navigation with an O-Arm to place the instrumentation.  This is a form of CT-guided navigation for the screws.  We take an intraoperative CT scan which provides an accurate view of the bony anatomy and we then place the screws using the imaging guidance and then take a second CT scan to help verify the screw position. So the benefit of this technology is a high accuracy for screw placement.    In addition, we discussed the use of bone morphogenic protein. I explained that this is a recombinant protein which is used to aid in bone healing. The main benefit of this protein is a high bony healing rate. However there are also some risks of its use. It is a little bit controversial, but some literature indicates risk of seroma, risk of radiculitis, risk of wound complications, risk of retrograde ejaculation, or even a small increased cancer risk from the inflammatory nature of the protein.  The patient understood the nature of these risks and together we agreed that the benefit of increased bony healing outweighed these risks, and we agreed to proceed with its use.      We talked about the postoperative recovery period, the need for activity avoidance, and the risk of adjacent segment disease or needing to extend her fusion at some point in the future.      She does have a history of a previous thoracic compression fracture.  Based on this, again she and I felt that it would be best to try and avoid extending all the way up and doing a large scoliosis correction because I think the risk of fracture would just be too high.  Nonetheless I do think it is worth including the L4 level in her fusion because of the severe joint destruction on the CT scan and the fact that there is already a spondylolisthesis there.    She understands and  she would like to proceed.  We will try to get things scheduled for her.    ADDENDUM: I was informed that [employer] will not pay for BMP used for this surgery and thus we will not use BMP and instead will use iliac crest autograft.    Respectfully,  Lane Ventura MD  Spine Surgery  [facility]

## 2019-03-12 NOTE — PATIENT INSTRUCTIONS
Preparing for Your Surgery      Name:  Leeanne Duarte   MRN:  [id number]   :  1950   Today's Date:  3/12/2019     Arriving for surgery:  Surgery date:  4/3/19  Arrival time:  05:30 am  Please come to:     [facility] [address]  [address] [place]  [address]    - parking is available in front of [facility] from 5:15AM to 8:00PM. If you prefer, park your car in the Green Lot.    -Proceed to the 3rd floor, check in at the Adult Surgery Waiting Lounge. [phone]    If an escort is needed stop at the Information Desk in the lobby. Inform the information person that you are here for surgery. An escort to the Adult Surgery Waiting Lounge will be provided.        What can I eat or drink?  -  You may have solid food or milk products until 8 hours prior to your surgery.  -  You may have water, apple juice or 7up/Sprite until 2 hours prior to your surgery.    Which medicines can I take?  Stop Aspirin, vitamins and supplements one week prior to surgery.  Hold Ibuprofen for 24 hours and/or Naproxen for 48 hours prior to surgery.     -  Please take these medications the day of surgery: lorazepam if needed; take scheduled medications normally taken in the morning.    How do I prepare myself?  -  Take two showers: one the night before surgery; and one the morning of surgery.         Use Scrubcare or Hibiclens to wash from neck down.  You may use your own     shampoo and conditioner. No other hair products.   -  Do NOT use lotion, powder, deodorant, or antiperspirant the day of your surgery.  -  Do NOT wear any makeup, fingernail polish or jewelry.  - Do not bring your own medications to the hospital, except for inhalers and eye   drops.  -  Bring your ID and insurance card.    Questions or Concerns:  -If you have questions or concerns regarding the day of surgery, please call [phone].     -For questions after surgery please call your surgeons  office.

## 2019-04-02 ENCOUNTER — ANESTHESIA EVENT (OUTPATIENT)
Dept: SURGERY | Facility: CLINIC | Age: 69
DRG: 454 | End: 2019-04-02
Payer: COMMERCIAL

## 2019-04-03 ENCOUNTER — ANESTHESIA (OUTPATIENT)
Dept: SURGERY | Facility: CLINIC | Age: 69
DRG: 454 | End: 2019-04-03
Payer: COMMERCIAL

## 2019-04-03 ENCOUNTER — HOSPITAL ENCOUNTER (INPATIENT)
Facility: CLINIC | Age: 69
LOS: 5 days | Discharge: HOME | DRG: 454 | End: 2019-04-08
Attending: ORTHOPAEDIC SURGERY | Admitting: ORTHOPAEDIC SURGERY
Payer: COMMERCIAL

## 2019-04-03 ENCOUNTER — APPOINTMENT (OUTPATIENT)
Dept: GENERAL RADIOLOGY | Facility: CLINIC | Age: 69
DRG: 454 | End: 2019-04-03
Attending: ORTHOPAEDIC SURGERY
Payer: COMMERCIAL

## 2019-04-03 DIAGNOSIS — Z98.890 STATUS POST LUMBAR SPINE SURGERY FOR DECOMPRESSION OF SPINAL CORD: Primary | ICD-10-CM

## 2019-04-03 LAB
ANION GAP SERPL CALCULATED.3IONS-SCNC: 8 MMOL/L (ref 3–14)
ANION GAP SERPL CALCULATED.3IONS-SCNC: 8 MMOL/L (ref 3–14)
BUN SERPL-MCNC: 11 MG/DL (ref 7–30)
BUN SERPL-MCNC: 8 MG/DL (ref 7–30)
CALCIUM SERPL-MCNC: 7.3 MG/DL (ref 8.5–10.1)
CALCIUM SERPL-MCNC: 7.5 MG/DL (ref 8.5–10.1)
CHLORIDE SERPL-SCNC: 87 MMOL/L (ref 94–109)
CHLORIDE SERPL-SCNC: 87 MMOL/L (ref 94–109)
CO2 SERPL-SCNC: 34 MMOL/L (ref 20–32)
CO2 SERPL-SCNC: 34 MMOL/L (ref 20–32)
CREAT SERPL-MCNC: 0.86 MG/DL (ref 0.52–1.04)
CREAT SERPL-MCNC: 0.86 MG/DL (ref 0.52–1.04)
ERYTHROCYTE [DISTWIDTH] IN BLOOD BY AUTOMATED COUNT: 18 % (ref 10–15)
GFR SERPL CREATININE-BSD FRML MDRD: 68 ML/MIN/{1.73_M2}
GFR SERPL CREATININE-BSD FRML MDRD: 69 ML/MIN/{1.73_M2}
GLUCOSE BLDC GLUCOMTR-MCNC: 127 MG/DL (ref 70–99)
GLUCOSE SERPL-MCNC: 128 MG/DL (ref 70–99)
GLUCOSE SERPL-MCNC: 133 MG/DL (ref 70–99)
HCT VFR BLD AUTO: 27.5 % (ref 35–47)
HGB BLD-MCNC: 7.5 G/DL (ref 11.7–15.7)
HGB BLD-MCNC: 8.4 G/DL (ref 11.7–15.7)
HGB BLD-MCNC: 8.7 G/DL (ref 11.7–15.7)
INTERPRETATION ECG - MUSE: NORMAL
LACTATE BLD-SCNC: 1.6 MMOL/L (ref 0.7–2)
MAGNESIUM SERPL-MCNC: 1.8 MG/DL (ref 1.6–2.3)
MCH RBC QN AUTO: 23.1 PG (ref 26.5–33)
MCHC RBC AUTO-ENTMCNC: 31.6 G/DL (ref 31.5–36.5)
MCV RBC AUTO: 73 FL (ref 78–100)
PLATELET # BLD AUTO: 209 10E9/L (ref 150–450)
POTASSIUM SERPL-SCNC: 2.8 MMOL/L (ref 3.4–5.3)
POTASSIUM SERPL-SCNC: 3 MMOL/L (ref 3.4–5.3)
POTASSIUM SERPL-SCNC: 3.3 MMOL/L (ref 3.4–5.3)
RBC # BLD AUTO: 3.76 10E12/L (ref 3.8–5.2)
SODIUM SERPL-SCNC: 129 MMOL/L (ref 133–144)
WBC # BLD AUTO: 8.1 10E9/L (ref 4–11)

## 2019-04-03 PROCEDURE — 0SG0071 FUSION OF LUMBAR VERTEBRAL JOINT WITH AUTOLOGOUS TISSUE SUBSTITUTE, POSTERIOR APPROACH, POSTERIOR COLUMN, OPEN APPROACH: ICD-10-PCS | Performed by: ORTHOPAEDIC SURGERY

## 2019-04-03 PROCEDURE — 37000008 ZZH ANESTHESIA TECHNICAL FEE, 1ST 30 MIN: Performed by: ORTHOPAEDIC SURGERY

## 2019-04-03 PROCEDURE — P9041 ALBUMIN (HUMAN),5%, 50ML: HCPCS | Performed by: NURSE ANESTHETIST, CERTIFIED REGISTERED

## 2019-04-03 PROCEDURE — 25800030 ZZH RX IP 258 OP 636: Performed by: ORTHOPAEDIC SURGERY

## 2019-04-03 PROCEDURE — 25000128 H RX IP 250 OP 636: Performed by: ORTHOPAEDIC SURGERY

## 2019-04-03 PROCEDURE — 85027 COMPLETE CBC AUTOMATED: CPT | Performed by: ANESTHESIOLOGY

## 2019-04-03 PROCEDURE — 25800030 ZZH RX IP 258 OP 636: Performed by: NURSE ANESTHETIST, CERTIFIED REGISTERED

## 2019-04-03 PROCEDURE — 93010 ELECTROCARDIOGRAM REPORT: CPT | Performed by: INTERNAL MEDICINE

## 2019-04-03 PROCEDURE — 01NB0ZZ RELEASE LUMBAR NERVE, OPEN APPROACH: ICD-10-PCS | Performed by: ORTHOPAEDIC SURGERY

## 2019-04-03 PROCEDURE — 80048 BASIC METABOLIC PNL TOTAL CA: CPT | Performed by: ANESTHESIOLOGY

## 2019-04-03 PROCEDURE — 25000125 ZZHC RX 250: Performed by: PHYSICIAN ASSISTANT

## 2019-04-03 PROCEDURE — 25800030 ZZH RX IP 258 OP 636

## 2019-04-03 PROCEDURE — 85018 HEMOGLOBIN: CPT | Performed by: ANESTHESIOLOGY

## 2019-04-03 PROCEDURE — 40000278 XR SURGERY CARM FLUORO LESS THAN 5 MIN: Mod: TC

## 2019-04-03 PROCEDURE — C1713 ANCHOR/SCREW BN/BN,TIS/BN: HCPCS | Performed by: ORTHOPAEDIC SURGERY

## 2019-04-03 PROCEDURE — 25000128 H RX IP 250 OP 636: Performed by: ANESTHESIOLOGY

## 2019-04-03 PROCEDURE — 25000301 ZZH OR RX SURGIFLO W/THROMBIN KIT 2ML 1991 OPNP: Performed by: ORTHOPAEDIC SURGERY

## 2019-04-03 PROCEDURE — 0QB20ZZ EXCISION OF RIGHT PELVIC BONE, OPEN APPROACH: ICD-10-PCS | Performed by: ORTHOPAEDIC SURGERY

## 2019-04-03 PROCEDURE — 85018 HEMOGLOBIN: CPT | Performed by: INTERNAL MEDICINE

## 2019-04-03 PROCEDURE — 25000125 ZZHC RX 250: Performed by: NURSE ANESTHETIST, CERTIFIED REGISTERED

## 2019-04-03 PROCEDURE — 12000001 ZZH R&B MED SURG/OB UMMC

## 2019-04-03 PROCEDURE — 27810169 ZZH OR IMPLANT GENERAL: Performed by: ORTHOPAEDIC SURGERY

## 2019-04-03 PROCEDURE — 27210794 ZZH OR GENERAL SUPPLY STERILE: Performed by: ORTHOPAEDIC SURGERY

## 2019-04-03 PROCEDURE — 99207 ZZC MOONLIGHTING INDICATOR: CPT | Performed by: INTERNAL MEDICINE

## 2019-04-03 PROCEDURE — 37000009 ZZH ANESTHESIA TECHNICAL FEE, EACH ADDTL 15 MIN: Performed by: ORTHOPAEDIC SURGERY

## 2019-04-03 PROCEDURE — 25000125 ZZHC RX 250: Performed by: ORTHOPAEDIC SURGERY

## 2019-04-03 PROCEDURE — 36000076 ZZH SURGERY LEVEL 6 EA 15 ADDTL MIN - UMMC: Performed by: ORTHOPAEDIC SURGERY

## 2019-04-03 PROCEDURE — C1762 CONN TISS, HUMAN(INC FASCIA): HCPCS | Performed by: ORTHOPAEDIC SURGERY

## 2019-04-03 PROCEDURE — 36415 COLL VENOUS BLD VENIPUNCTURE: CPT | Performed by: ANESTHESIOLOGY

## 2019-04-03 PROCEDURE — 36415 COLL VENOUS BLD VENIPUNCTURE: CPT | Performed by: INTERNAL MEDICINE

## 2019-04-03 PROCEDURE — 25000132 ZZH RX MED GY IP 250 OP 250 PS 637: Performed by: PHYSICIAN ASSISTANT

## 2019-04-03 PROCEDURE — 80048 BASIC METABOLIC PNL TOTAL CA: CPT | Performed by: INTERNAL MEDICINE

## 2019-04-03 PROCEDURE — 0SG30AJ FUSION OF LUMBOSACRAL JOINT WITH INTERBODY FUSION DEVICE, POSTERIOR APPROACH, ANTERIOR COLUMN, OPEN APPROACH: ICD-10-PCS | Performed by: ORTHOPAEDIC SURGERY

## 2019-04-03 PROCEDURE — 25000128 H RX IP 250 OP 636: Performed by: INTERNAL MEDICINE

## 2019-04-03 PROCEDURE — 25800030 ZZH RX IP 258 OP 636: Performed by: PHYSICIAN ASSISTANT

## 2019-04-03 PROCEDURE — 0SG00AJ FUSION OF LUMBAR VERTEBRAL JOINT WITH INTERBODY FUSION DEVICE, POSTERIOR APPROACH, ANTERIOR COLUMN, OPEN APPROACH: ICD-10-PCS | Performed by: ORTHOPAEDIC SURGERY

## 2019-04-03 PROCEDURE — 83605 ASSAY OF LACTIC ACID: CPT | Performed by: INTERNAL MEDICINE

## 2019-04-03 PROCEDURE — 25000128 H RX IP 250 OP 636: Performed by: STUDENT IN AN ORGANIZED HEALTH CARE EDUCATION/TRAINING PROGRAM

## 2019-04-03 PROCEDURE — 25000565 ZZH ISOFLURANE, EA 15 MIN: Performed by: ORTHOPAEDIC SURGERY

## 2019-04-03 PROCEDURE — 36000078 ZZH SURGERY LEVEL 6 W FLUORO 1ST 30 MIN - UMMC: Performed by: ORTHOPAEDIC SURGERY

## 2019-04-03 PROCEDURE — 40000170 ZZH STATISTIC PRE-PROCEDURE ASSESSMENT II: Performed by: ORTHOPAEDIC SURGERY

## 2019-04-03 PROCEDURE — 27210995 ZZH RX 272: Performed by: ORTHOPAEDIC SURGERY

## 2019-04-03 PROCEDURE — 00000146 ZZHCL STATISTIC GLUCOSE BY METER IP

## 2019-04-03 PROCEDURE — 0SG3071 FUSION OF LUMBOSACRAL JOINT WITH AUTOLOGOUS TISSUE SUBSTITUTE, POSTERIOR APPROACH, POSTERIOR COLUMN, OPEN APPROACH: ICD-10-PCS | Performed by: ORTHOPAEDIC SURGERY

## 2019-04-03 PROCEDURE — 84295 ASSAY OF SERUM SODIUM: CPT | Performed by: ANESTHESIOLOGY

## 2019-04-03 PROCEDURE — 71000014 ZZH RECOVERY PHASE 1 LEVEL 2 FIRST HR: Performed by: ORTHOPAEDIC SURGERY

## 2019-04-03 PROCEDURE — 25000128 H RX IP 250 OP 636: Performed by: PHYSICIAN ASSISTANT

## 2019-04-03 PROCEDURE — 27211020 ZZHC OR CELL SAVER OPNP: Performed by: ORTHOPAEDIC SURGERY

## 2019-04-03 PROCEDURE — 71000015 ZZH RECOVERY PHASE 1 LEVEL 2 EA ADDTL HR: Performed by: ORTHOPAEDIC SURGERY

## 2019-04-03 PROCEDURE — 25000132 ZZH RX MED GY IP 250 OP 250 PS 637: Performed by: STUDENT IN AN ORGANIZED HEALTH CARE EDUCATION/TRAINING PROGRAM

## 2019-04-03 PROCEDURE — 83735 ASSAY OF MAGNESIUM: CPT | Performed by: INTERNAL MEDICINE

## 2019-04-03 PROCEDURE — 25000128 H RX IP 250 OP 636: Performed by: NURSE ANESTHETIST, CERTIFIED REGISTERED

## 2019-04-03 PROCEDURE — 84132 ASSAY OF SERUM POTASSIUM: CPT | Performed by: ANESTHESIOLOGY

## 2019-04-03 DEVICE — IMP SCR MEDT 5.5/6.0MM SOLERA 7.5X40MM MA 55840007540: Type: IMPLANTABLE DEVICE | Site: SPINE LUMBAR | Status: FUNCTIONAL

## 2019-04-03 DEVICE — IMP SPACER MEDT CAPSTONE CONTROL 13X22MM 18DEG 4021322: Type: IMPLANTABLE DEVICE | Site: SPINE LUMBAR | Status: FUNCTIONAL

## 2019-04-03 DEVICE — GRAFT BONE CRUSH CANC 30ML 400080: Type: IMPLANTABLE DEVICE | Site: SPINE LUMBAR | Status: FUNCTIONAL

## 2019-04-03 DEVICE — IMP ROD MEDT SOLERA CVD 5.5X80MM CHR 1555501080: Type: IMPLANTABLE DEVICE | Site: SPINE LUMBAR | Status: FUNCTIONAL

## 2019-04-03 DEVICE — IMP SCR MEDT 5.5/6.0MM SOLERA 7.5X55MM MA 55840007555: Type: IMPLANTABLE DEVICE | Site: SPINE LUMBAR | Status: FUNCTIONAL

## 2019-04-03 DEVICE — IMP SCR MEDT 5.5/6.0MM SOLERA 7.5X50MM MA 55840007550: Type: IMPLANTABLE DEVICE | Site: SPINE LUMBAR | Status: FUNCTIONAL

## 2019-04-03 DEVICE — IMP ROD MEDT SOLERA CVD 5.5X90MM CHR 1555501090: Type: IMPLANTABLE DEVICE | Site: SPINE LUMBAR | Status: FUNCTIONAL

## 2019-04-03 DEVICE — IMP SCR SET MEDT SOLERA BREAK OFF 5.5MM TI 5540030: Type: IMPLANTABLE DEVICE | Site: SPINE LUMBAR | Status: FUNCTIONAL

## 2019-04-03 DEVICE — IMPLANTABLE DEVICE: Type: IMPLANTABLE DEVICE | Site: SPINE LUMBAR | Status: FUNCTIONAL

## 2019-04-03 RX ORDER — ONDANSETRON 4 MG/1
4 TABLET, ORALLY DISINTEGRATING ORAL EVERY 30 MIN PRN
Status: DISCONTINUED | OUTPATIENT
Start: 2019-04-03 | End: 2019-04-03 | Stop reason: HOSPADM

## 2019-04-03 RX ORDER — ALBUMIN, HUMAN INJ 5% 5 %
SOLUTION INTRAVENOUS CONTINUOUS PRN
Status: DISCONTINUED | OUTPATIENT
Start: 2019-04-03 | End: 2019-04-03

## 2019-04-03 RX ORDER — LIDOCAINE 40 MG/G
CREAM TOPICAL
Status: DISCONTINUED | OUTPATIENT
Start: 2019-04-03 | End: 2019-04-08 | Stop reason: HOSPADM

## 2019-04-03 RX ORDER — SODIUM CHLORIDE 9 MG/ML
INJECTION, SOLUTION INTRAVENOUS
Status: COMPLETED
Start: 2019-04-03 | End: 2019-04-03

## 2019-04-03 RX ORDER — PROCHLORPERAZINE 25 MG
12.5 SUPPOSITORY, RECTAL RECTAL EVERY 12 HOURS PRN
Status: DISCONTINUED | OUTPATIENT
Start: 2019-04-03 | End: 2019-04-08 | Stop reason: HOSPADM

## 2019-04-03 RX ORDER — LIDOCAINE HYDROCHLORIDE 20 MG/ML
INJECTION, SOLUTION INFILTRATION; PERINEURAL PRN
Status: DISCONTINUED | OUTPATIENT
Start: 2019-04-03 | End: 2019-04-03

## 2019-04-03 RX ORDER — AMOXICILLIN 250 MG
2 CAPSULE ORAL 2 TIMES DAILY
Status: DISCONTINUED | OUTPATIENT
Start: 2019-04-03 | End: 2019-04-08 | Stop reason: HOSPADM

## 2019-04-03 RX ORDER — OXYCODONE HYDROCHLORIDE 5 MG/1
5-10 TABLET ORAL
Status: DISCONTINUED | OUTPATIENT
Start: 2019-04-03 | End: 2019-04-08 | Stop reason: HOSPADM

## 2019-04-03 RX ORDER — HYDROMORPHONE HYDROCHLORIDE 1 MG/ML
.3-.5 INJECTION, SOLUTION INTRAMUSCULAR; INTRAVENOUS; SUBCUTANEOUS EVERY 5 MIN PRN
Status: DISCONTINUED | OUTPATIENT
Start: 2019-04-03 | End: 2019-04-03 | Stop reason: HOSPADM

## 2019-04-03 RX ORDER — DIPHENHYDRAMINE HYDROCHLORIDE 50 MG/ML
12.5 INJECTION INTRAMUSCULAR; INTRAVENOUS EVERY 6 HOURS PRN
Status: DISCONTINUED | OUTPATIENT
Start: 2019-04-03 | End: 2019-04-08 | Stop reason: HOSPADM

## 2019-04-03 RX ORDER — POLYETHYLENE GLYCOL 3350 17 G/17G
17 POWDER, FOR SOLUTION ORAL DAILY
Status: DISCONTINUED | OUTPATIENT
Start: 2019-04-03 | End: 2019-04-08 | Stop reason: HOSPADM

## 2019-04-03 RX ORDER — PROCHLORPERAZINE MALEATE 5 MG
5 TABLET ORAL EVERY 6 HOURS PRN
Status: DISCONTINUED | OUTPATIENT
Start: 2019-04-03 | End: 2019-04-08 | Stop reason: HOSPADM

## 2019-04-03 RX ORDER — CEFAZOLIN SODIUM 1 G/3ML
1 INJECTION, POWDER, FOR SOLUTION INTRAMUSCULAR; INTRAVENOUS EVERY 8 HOURS
Status: DISPENSED | OUTPATIENT
Start: 2019-04-03 | End: 2019-04-06

## 2019-04-03 RX ORDER — GABAPENTIN 100 MG/1
100 CAPSULE ORAL 3 TIMES DAILY
Status: DISCONTINUED | OUTPATIENT
Start: 2019-04-03 | End: 2019-04-04

## 2019-04-03 RX ORDER — CEFAZOLIN SODIUM 2 G/100ML
2 INJECTION, SOLUTION INTRAVENOUS
Status: COMPLETED | OUTPATIENT
Start: 2019-04-03 | End: 2019-04-03

## 2019-04-03 RX ORDER — ONDANSETRON 2 MG/ML
4 INJECTION INTRAMUSCULAR; INTRAVENOUS EVERY 30 MIN PRN
Status: DISCONTINUED | OUTPATIENT
Start: 2019-04-03 | End: 2019-04-03 | Stop reason: HOSPADM

## 2019-04-03 RX ORDER — METOCLOPRAMIDE HYDROCHLORIDE 5 MG/ML
5 INJECTION INTRAMUSCULAR; INTRAVENOUS EVERY 6 HOURS PRN
Status: DISCONTINUED | OUTPATIENT
Start: 2019-04-03 | End: 2019-04-08 | Stop reason: HOSPADM

## 2019-04-03 RX ORDER — ACETAMINOPHEN 325 MG/1
975 TABLET ORAL ONCE
Status: COMPLETED | OUTPATIENT
Start: 2019-04-03 | End: 2019-04-03

## 2019-04-03 RX ORDER — HYDROMORPHONE HYDROCHLORIDE 1 MG/ML
0.2 INJECTION, SOLUTION INTRAMUSCULAR; INTRAVENOUS; SUBCUTANEOUS
Status: DISCONTINUED | OUTPATIENT
Start: 2019-04-04 | End: 2019-04-08 | Stop reason: HOSPADM

## 2019-04-03 RX ORDER — POTASSIUM CHLORIDE 750 MG/1
20-40 TABLET, EXTENDED RELEASE ORAL
Status: DISCONTINUED | OUTPATIENT
Start: 2019-04-03 | End: 2019-04-08 | Stop reason: HOSPADM

## 2019-04-03 RX ORDER — MAGNESIUM SULFATE HEPTAHYDRATE 40 MG/ML
4 INJECTION, SOLUTION INTRAVENOUS EVERY 4 HOURS PRN
Status: DISCONTINUED | OUTPATIENT
Start: 2019-04-03 | End: 2019-04-08 | Stop reason: HOSPADM

## 2019-04-03 RX ORDER — CEFAZOLIN SODIUM 1 G/3ML
1 INJECTION, POWDER, FOR SOLUTION INTRAMUSCULAR; INTRAVENOUS SEE ADMIN INSTRUCTIONS
Status: DISCONTINUED | OUTPATIENT
Start: 2019-04-03 | End: 2019-04-03 | Stop reason: HOSPADM

## 2019-04-03 RX ORDER — POTASSIUM CHLORIDE 7.45 MG/ML
10 INJECTION INTRAVENOUS
Status: DISCONTINUED | OUTPATIENT
Start: 2019-04-03 | End: 2019-04-08 | Stop reason: HOSPADM

## 2019-04-03 RX ORDER — ONDANSETRON 2 MG/ML
INJECTION INTRAMUSCULAR; INTRAVENOUS PRN
Status: DISCONTINUED | OUTPATIENT
Start: 2019-04-03 | End: 2019-04-03

## 2019-04-03 RX ORDER — DEXTROSE MONOHYDRATE, SODIUM CHLORIDE, AND POTASSIUM CHLORIDE 50; 1.49; 4.5 G/1000ML; G/1000ML; G/1000ML
INJECTION, SOLUTION INTRAVENOUS CONTINUOUS
Status: DISCONTINUED | OUTPATIENT
Start: 2019-04-03 | End: 2019-04-03

## 2019-04-03 RX ORDER — BISACODYL 10 MG
10 SUPPOSITORY, RECTAL RECTAL DAILY PRN
Status: DISCONTINUED | OUTPATIENT
Start: 2019-04-03 | End: 2019-04-08 | Stop reason: HOSPADM

## 2019-04-03 RX ORDER — POTASSIUM CHLORIDE 29.8 MG/ML
20 INJECTION INTRAVENOUS
Status: DISCONTINUED | OUTPATIENT
Start: 2019-04-03 | End: 2019-04-08 | Stop reason: HOSPADM

## 2019-04-03 RX ORDER — FENTANYL CITRATE 50 UG/ML
25-50 INJECTION, SOLUTION INTRAMUSCULAR; INTRAVENOUS
Status: DISCONTINUED | OUTPATIENT
Start: 2019-04-03 | End: 2019-04-03 | Stop reason: HOSPADM

## 2019-04-03 RX ORDER — VANCOMYCIN HYDROCHLORIDE 1 G/20ML
INJECTION, POWDER, LYOPHILIZED, FOR SOLUTION INTRAVENOUS PRN
Status: DISCONTINUED | OUTPATIENT
Start: 2019-04-03 | End: 2019-04-03 | Stop reason: HOSPADM

## 2019-04-03 RX ORDER — DIAZEPAM 5 MG
5 TABLET ORAL EVERY 6 HOURS PRN
Status: DISCONTINUED | OUTPATIENT
Start: 2019-04-03 | End: 2019-04-08 | Stop reason: HOSPADM

## 2019-04-03 RX ORDER — POTASSIUM CL/LIDO/0.9 % NACL 10MEQ/0.1L
10 INTRAVENOUS SOLUTION, PIGGYBACK (ML) INTRAVENOUS
Status: DISCONTINUED | OUTPATIENT
Start: 2019-04-03 | End: 2019-04-08 | Stop reason: HOSPADM

## 2019-04-03 RX ORDER — PROPOFOL 10 MG/ML
INJECTION, EMULSION INTRAVENOUS PRN
Status: DISCONTINUED | OUTPATIENT
Start: 2019-04-03 | End: 2019-04-03

## 2019-04-03 RX ORDER — ASCORBIC ACID 500 MG
500 TABLET ORAL DAILY
Status: DISCONTINUED | OUTPATIENT
Start: 2019-04-04 | End: 2019-04-08 | Stop reason: HOSPADM

## 2019-04-03 RX ORDER — SODIUM CHLORIDE, SODIUM LACTATE, POTASSIUM CHLORIDE, CALCIUM CHLORIDE 600; 310; 30; 20 MG/100ML; MG/100ML; MG/100ML; MG/100ML
INJECTION, SOLUTION INTRAVENOUS CONTINUOUS
Status: DISCONTINUED | OUTPATIENT
Start: 2019-04-03 | End: 2019-04-03 | Stop reason: HOSPADM

## 2019-04-03 RX ORDER — ONDANSETRON 4 MG/1
4 TABLET, ORALLY DISINTEGRATING ORAL EVERY 6 HOURS PRN
Status: DISCONTINUED | OUTPATIENT
Start: 2019-04-03 | End: 2019-04-08 | Stop reason: HOSPADM

## 2019-04-03 RX ORDER — DIPHENHYDRAMINE HCL 12.5MG/5ML
12.5 LIQUID (ML) ORAL EVERY 6 HOURS PRN
Status: DISCONTINUED | OUTPATIENT
Start: 2019-04-03 | End: 2019-04-08 | Stop reason: HOSPADM

## 2019-04-03 RX ORDER — ESCITALOPRAM OXALATE 10 MG/1
10 TABLET ORAL AT BEDTIME
Status: DISCONTINUED | OUTPATIENT
Start: 2019-04-03 | End: 2019-04-08 | Stop reason: HOSPADM

## 2019-04-03 RX ORDER — HYDROMORPHONE HYDROCHLORIDE 1 MG/ML
0.5 INJECTION, SOLUTION INTRAMUSCULAR; INTRAVENOUS; SUBCUTANEOUS
Status: DISCONTINUED | OUTPATIENT
Start: 2019-04-03 | End: 2019-04-03

## 2019-04-03 RX ORDER — DIPHENHYDRAMINE HYDROCHLORIDE 50 MG/ML
12.5 INJECTION INTRAMUSCULAR; INTRAVENOUS EVERY 6 HOURS PRN
Status: DISCONTINUED | OUTPATIENT
Start: 2019-04-03 | End: 2019-04-03

## 2019-04-03 RX ORDER — ACETAMINOPHEN 325 MG/1
975 TABLET ORAL EVERY 8 HOURS
Status: COMPLETED | OUTPATIENT
Start: 2019-04-03 | End: 2019-04-06

## 2019-04-03 RX ORDER — METOCLOPRAMIDE 5 MG/1
5 TABLET ORAL EVERY 6 HOURS PRN
Status: DISCONTINUED | OUTPATIENT
Start: 2019-04-03 | End: 2019-04-08 | Stop reason: HOSPADM

## 2019-04-03 RX ORDER — ACETAMINOPHEN 325 MG/1
650 TABLET ORAL EVERY 4 HOURS PRN
Status: DISCONTINUED | OUTPATIENT
Start: 2019-04-06 | End: 2019-04-08 | Stop reason: HOSPADM

## 2019-04-03 RX ORDER — GABAPENTIN 100 MG/1
100 CAPSULE ORAL
Status: COMPLETED | OUTPATIENT
Start: 2019-04-03 | End: 2019-04-03

## 2019-04-03 RX ORDER — NALOXONE HYDROCHLORIDE 0.4 MG/ML
.1-.4 INJECTION, SOLUTION INTRAMUSCULAR; INTRAVENOUS; SUBCUTANEOUS
Status: DISCONTINUED | OUTPATIENT
Start: 2019-04-03 | End: 2019-04-08 | Stop reason: HOSPADM

## 2019-04-03 RX ORDER — SODIUM CHLORIDE, SODIUM LACTATE, POTASSIUM CHLORIDE, CALCIUM CHLORIDE 600; 310; 30; 20 MG/100ML; MG/100ML; MG/100ML; MG/100ML
INJECTION, SOLUTION INTRAVENOUS CONTINUOUS PRN
Status: DISCONTINUED | OUTPATIENT
Start: 2019-04-03 | End: 2019-04-03

## 2019-04-03 RX ORDER — FENTANYL CITRATE 50 UG/ML
INJECTION, SOLUTION INTRAMUSCULAR; INTRAVENOUS PRN
Status: DISCONTINUED | OUTPATIENT
Start: 2019-04-03 | End: 2019-04-03

## 2019-04-03 RX ORDER — SODIUM CHLORIDE 9 MG/ML
INJECTION, SOLUTION INTRAVENOUS CONTINUOUS PRN
Status: DISCONTINUED | OUTPATIENT
Start: 2019-04-03 | End: 2019-04-03

## 2019-04-03 RX ORDER — ONDANSETRON 2 MG/ML
4 INJECTION INTRAMUSCULAR; INTRAVENOUS EVERY 6 HOURS PRN
Status: DISCONTINUED | OUTPATIENT
Start: 2019-04-03 | End: 2019-04-08 | Stop reason: HOSPADM

## 2019-04-03 RX ORDER — POTASSIUM CHLORIDE 1.5 G/1.58G
20-40 POWDER, FOR SOLUTION ORAL
Status: DISCONTINUED | OUTPATIENT
Start: 2019-04-03 | End: 2019-04-08 | Stop reason: HOSPADM

## 2019-04-03 RX ORDER — DEXAMETHASONE SODIUM PHOSPHATE 4 MG/ML
INJECTION, SOLUTION INTRA-ARTICULAR; INTRALESIONAL; INTRAMUSCULAR; INTRAVENOUS; SOFT TISSUE PRN
Status: DISCONTINUED | OUTPATIENT
Start: 2019-04-03 | End: 2019-04-03

## 2019-04-03 RX ORDER — NALOXONE HYDROCHLORIDE 0.4 MG/ML
.1-.4 INJECTION, SOLUTION INTRAMUSCULAR; INTRAVENOUS; SUBCUTANEOUS
Status: DISCONTINUED | OUTPATIENT
Start: 2019-04-03 | End: 2019-04-03

## 2019-04-03 RX ORDER — DIPHENHYDRAMINE HCL 12.5MG/5ML
12.5 LIQUID (ML) ORAL EVERY 6 HOURS PRN
Status: DISCONTINUED | OUTPATIENT
Start: 2019-04-03 | End: 2019-04-03

## 2019-04-03 RX ORDER — BUPIVACAINE HYDROCHLORIDE AND EPINEPHRINE 2.5; 5 MG/ML; UG/ML
INJECTION, SOLUTION INFILTRATION; PERINEURAL PRN
Status: DISCONTINUED | OUTPATIENT
Start: 2019-04-03 | End: 2019-04-03 | Stop reason: HOSPADM

## 2019-04-03 RX ORDER — SODIUM CHLORIDE 9 MG/ML
INJECTION, SOLUTION INTRAVENOUS CONTINUOUS
Status: DISCONTINUED | OUTPATIENT
Start: 2019-04-03 | End: 2019-04-08 | Stop reason: HOSPADM

## 2019-04-03 RX ADMIN — PHENYLEPHRINE HYDROCHLORIDE 0.3 MCG/KG/MIN: 10 INJECTION, SOLUTION INTRAMUSCULAR; INTRAVENOUS; SUBCUTANEOUS at 10:01

## 2019-04-03 RX ADMIN — LIDOCAINE HYDROCHLORIDE 40 MG: 20 INJECTION, SOLUTION INFILTRATION; PERINEURAL at 07:12

## 2019-04-03 RX ADMIN — SODIUM CHLORIDE, SODIUM LACTATE, POTASSIUM CHLORIDE, CALCIUM CHLORIDE: 600; 310; 30; 20 INJECTION, SOLUTION INTRAVENOUS at 09:50

## 2019-04-03 RX ADMIN — PHENYLEPHRINE HYDROCHLORIDE 100 MCG: 10 INJECTION, SOLUTION INTRAMUSCULAR; INTRAVENOUS; SUBCUTANEOUS at 08:40

## 2019-04-03 RX ADMIN — PHENYLEPHRINE HYDROCHLORIDE 50 MCG: 10 INJECTION, SOLUTION INTRAMUSCULAR; INTRAVENOUS; SUBCUTANEOUS at 10:41

## 2019-04-03 RX ADMIN — SENNOSIDES AND DOCUSATE SODIUM 2 TABLET: 8.6; 5 TABLET ORAL at 20:55

## 2019-04-03 RX ADMIN — SODIUM CHLORIDE 500 ML: 9 INJECTION, SOLUTION INTRAVENOUS at 21:05

## 2019-04-03 RX ADMIN — POLYETHYLENE GLYCOL 3350 17 G: 17 POWDER, FOR SOLUTION ORAL at 20:55

## 2019-04-03 RX ADMIN — SODIUM CHLORIDE 1.39 G: 9 INJECTION, SOLUTION INTRAVENOUS at 07:18

## 2019-04-03 RX ADMIN — ACETAMINOPHEN 975 MG: 325 TABLET, FILM COATED ORAL at 14:41

## 2019-04-03 RX ADMIN — LIDOCAINE HYDROCHLORIDE 40 MG: 20 INJECTION, SOLUTION INFILTRATION; PERINEURAL at 07:09

## 2019-04-03 RX ADMIN — PHENYLEPHRINE HYDROCHLORIDE 50 MCG: 10 INJECTION, SOLUTION INTRAMUSCULAR; INTRAVENOUS; SUBCUTANEOUS at 11:15

## 2019-04-03 RX ADMIN — GABAPENTIN 100 MG: 100 CAPSULE ORAL at 06:19

## 2019-04-03 RX ADMIN — PHENYLEPHRINE HYDROCHLORIDE 100 MCG: 10 INJECTION, SOLUTION INTRAMUSCULAR; INTRAVENOUS; SUBCUTANEOUS at 10:28

## 2019-04-03 RX ADMIN — PHENYLEPHRINE HYDROCHLORIDE 100 MCG: 10 INJECTION, SOLUTION INTRAMUSCULAR; INTRAVENOUS; SUBCUTANEOUS at 11:22

## 2019-04-03 RX ADMIN — CEFAZOLIN SODIUM 1 G: 2 INJECTION, SOLUTION INTRAVENOUS at 09:30

## 2019-04-03 RX ADMIN — ROCURONIUM BROMIDE 10 MG: 10 INJECTION INTRAVENOUS at 08:40

## 2019-04-03 RX ADMIN — SODIUM CHLORIDE: 9 INJECTION, SOLUTION INTRAVENOUS at 18:43

## 2019-04-03 RX ADMIN — MIDAZOLAM 2 MG: 1 INJECTION INTRAMUSCULAR; INTRAVENOUS at 07:06

## 2019-04-03 RX ADMIN — PHENYLEPHRINE HYDROCHLORIDE 100 MCG: 10 INJECTION, SOLUTION INTRAMUSCULAR; INTRAVENOUS; SUBCUTANEOUS at 08:52

## 2019-04-03 RX ADMIN — ESCITALOPRAM OXALATE 10 MG: 10 TABLET ORAL at 21:59

## 2019-04-03 RX ADMIN — PHENYLEPHRINE HYDROCHLORIDE 50 MCG: 10 INJECTION, SOLUTION INTRAMUSCULAR; INTRAVENOUS; SUBCUTANEOUS at 10:50

## 2019-04-03 RX ADMIN — SUGAMMADEX 90 MG: 100 INJECTION, SOLUTION INTRAVENOUS at 12:08

## 2019-04-03 RX ADMIN — HYDROMORPHONE HYDROCHLORIDE 0.5 MG: 1 INJECTION, SOLUTION INTRAMUSCULAR; INTRAVENOUS; SUBCUTANEOUS at 10:21

## 2019-04-03 RX ADMIN — PHENYLEPHRINE HYDROCHLORIDE 50 MCG: 10 INJECTION, SOLUTION INTRAMUSCULAR; INTRAVENOUS; SUBCUTANEOUS at 09:56

## 2019-04-03 RX ADMIN — DEXAMETHASONE SODIUM PHOSPHATE 8 MG: 4 INJECTION, SOLUTION INTRAMUSCULAR; INTRAVENOUS at 07:12

## 2019-04-03 RX ADMIN — PHENYLEPHRINE HYDROCHLORIDE 100 MCG: 10 INJECTION, SOLUTION INTRAMUSCULAR; INTRAVENOUS; SUBCUTANEOUS at 11:29

## 2019-04-03 RX ADMIN — ROCURONIUM BROMIDE 20 MG: 10 INJECTION INTRAVENOUS at 07:39

## 2019-04-03 RX ADMIN — CEFAZOLIN SODIUM 1 G: 2 INJECTION, SOLUTION INTRAVENOUS at 11:30

## 2019-04-03 RX ADMIN — PHENYLEPHRINE HYDROCHLORIDE 0.3 MCG/KG/MIN: 10 INJECTION, SOLUTION INTRAMUSCULAR; INTRAVENOUS; SUBCUTANEOUS at 08:56

## 2019-04-03 RX ADMIN — ROCURONIUM BROMIDE 50 MG: 10 INJECTION INTRAVENOUS at 07:14

## 2019-04-03 RX ADMIN — ACETAMINOPHEN 975 MG: 325 TABLET, FILM COATED ORAL at 06:19

## 2019-04-03 RX ADMIN — Medication: at 15:58

## 2019-04-03 RX ADMIN — ALBUMIN HUMAN: 0.05 INJECTION, SOLUTION INTRAVENOUS at 08:01

## 2019-04-03 RX ADMIN — GABAPENTIN 100 MG: 100 CAPSULE ORAL at 20:54

## 2019-04-03 RX ADMIN — Medication: at 22:02

## 2019-04-03 RX ADMIN — CEFAZOLIN 1 G: 1 INJECTION, POWDER, FOR SOLUTION INTRAMUSCULAR; INTRAVENOUS at 23:24

## 2019-04-03 RX ADMIN — PHENYLEPHRINE HYDROCHLORIDE 50 MCG: 10 INJECTION, SOLUTION INTRAMUSCULAR; INTRAVENOUS; SUBCUTANEOUS at 08:34

## 2019-04-03 RX ADMIN — PHENYLEPHRINE HYDROCHLORIDE 100 MCG: 10 INJECTION, SOLUTION INTRAMUSCULAR; INTRAVENOUS; SUBCUTANEOUS at 10:35

## 2019-04-03 RX ADMIN — PROPOFOL 130 MG: 10 INJECTION, EMULSION INTRAVENOUS at 07:12

## 2019-04-03 RX ADMIN — ROCURONIUM BROMIDE 10 MG: 10 INJECTION INTRAVENOUS at 09:57

## 2019-04-03 RX ADMIN — HYDROMORPHONE HYDROCHLORIDE 0.5 MG: 1 INJECTION, SOLUTION INTRAMUSCULAR; INTRAVENOUS; SUBCUTANEOUS at 08:32

## 2019-04-03 RX ADMIN — ACETAMINOPHEN 975 MG: 325 TABLET, FILM COATED ORAL at 20:54

## 2019-04-03 RX ADMIN — CEFAZOLIN SODIUM 2 G: 2 INJECTION, SOLUTION INTRAVENOUS at 07:21

## 2019-04-03 RX ADMIN — PHENYLEPHRINE HYDROCHLORIDE 50 MCG: 10 INJECTION, SOLUTION INTRAMUSCULAR; INTRAVENOUS; SUBCUTANEOUS at 12:13

## 2019-04-03 RX ADMIN — PHENYLEPHRINE HYDROCHLORIDE 50 MCG: 10 INJECTION, SOLUTION INTRAMUSCULAR; INTRAVENOUS; SUBCUTANEOUS at 07:49

## 2019-04-03 RX ADMIN — PHENYLEPHRINE HYDROCHLORIDE 50 MCG: 10 INJECTION, SOLUTION INTRAMUSCULAR; INTRAVENOUS; SUBCUTANEOUS at 11:09

## 2019-04-03 RX ADMIN — PHENYLEPHRINE HYDROCHLORIDE 100 MCG: 10 INJECTION, SOLUTION INTRAMUSCULAR; INTRAVENOUS; SUBCUTANEOUS at 10:04

## 2019-04-03 RX ADMIN — SODIUM CHLORIDE: 9 INJECTION, SOLUTION INTRAVENOUS at 10:24

## 2019-04-03 RX ADMIN — PHENYLEPHRINE HYDROCHLORIDE 100 MCG: 10 INJECTION, SOLUTION INTRAMUSCULAR; INTRAVENOUS; SUBCUTANEOUS at 11:40

## 2019-04-03 RX ADMIN — FENTANYL CITRATE 50 MCG: 50 INJECTION, SOLUTION INTRAMUSCULAR; INTRAVENOUS at 07:11

## 2019-04-03 RX ADMIN — SODIUM CHLORIDE, SODIUM LACTATE, POTASSIUM CHLORIDE, CALCIUM CHLORIDE: 600; 310; 30; 20 INJECTION, SOLUTION INTRAVENOUS at 07:05

## 2019-04-03 RX ADMIN — ONDANSETRON 4 MG: 2 INJECTION INTRAMUSCULAR; INTRAVENOUS at 11:41

## 2019-04-03 RX ADMIN — RANITIDINE 150 MG: 150 TABLET ORAL at 20:54

## 2019-04-03 RX ADMIN — SODIUM CHLORIDE 10 MG/KG/HR: 9 INJECTION, SOLUTION INTRAVENOUS at 07:35

## 2019-04-03 RX ADMIN — Medication 0.2 MG: at 14:47

## 2019-04-03 RX ADMIN — PHENYLEPHRINE HYDROCHLORIDE 50 MCG: 10 INJECTION, SOLUTION INTRAMUSCULAR; INTRAVENOUS; SUBCUTANEOUS at 08:00

## 2019-04-03 ASSESSMENT — ACTIVITIES OF DAILY LIVING (ADL)
TOILETING: 0-->INDEPENDENT
RETIRED_COMMUNICATION: 0-->UNDERSTANDS/COMMUNICATES WITHOUT DIFFICULTY
NUMBER_OF_TIMES_PATIENT_HAS_FALLEN_WITHIN_LAST_SIX_MONTHS: 1
ADLS_ACUITY_SCORE: 12
COGNITION: 0 - NO COGNITION ISSUES REPORTED
BATHING: 0-->INDEPENDENT
TRANSFERRING: 0-->INDEPENDENT
RETIRED_EATING: 0-->INDEPENDENT
AMBULATION: 0-->INDEPENDENT
SWALLOWING: 0-->SWALLOWS FOODS/LIQUIDS WITHOUT DIFFICULTY
FALL_HISTORY_WITHIN_LAST_SIX_MONTHS: YES
DRESS: 0-->INDEPENDENT
WHICH_OF_THE_ABOVE_FUNCTIONAL_RISKS_HAD_A_RECENT_ONSET_OR_CHANGE?: AMBULATION

## 2019-04-03 ASSESSMENT — MIFFLIN-ST. JEOR: SCORE: 863.75

## 2019-04-03 NOTE — ANESTHESIA POSTPROCEDURE EVALUATION
Anesthesia POST Procedure Evaluation    Patient: Leeanne Duarte   MRN:     [id number] Gender:   female   Age:    68 year old :      1950        Preoperative Diagnosis: Lumbar Stenosis and Neurogenic Claudication, Scoliosis and Spondylolisthesis with Radiculopathy   Procedure(s):  Lumbar 3-4 Decompression, Lumbar 4-Sacral 1 Transforaminal Lumbar Interbody Fusion, Lumbar 5-Sacral 1 Kaminski Maxwell Osteotomy, Lumbar 4-Pelvis Instrumentation, Illiac Crest Autograft  [place] Bone Marrow From Iliac Crest   Postop Comments: No value filed.       Anesthesia Type:  General    Reportable Event: NO     PAIN: Uncomplicated   Sign Out status: Comfortable, Well controlled pain     PONV: No PONV   Sign Out status:  No Nausea or Vomiting     Neuro/Psych: Uneventful perioperative course   Sign Out Status: Preoperative baseline; Age appropriate mentation     Airway/Resp.: Uneventful perioperative course   Sign Out Status: Non labored breathing, age appropriate RR; Resp. Status within EXPECTED Parameters     CV: Uneventful perioperative course   Sign Out status: Appropriate BP and perfusion indices; Appropriate HR/Rhythm     Disposition:   Sign Out in:  PACU  Disposition:  Floor  Recovery Course: Uneventful  Follow-Up: Not required           Last Anesthesia Record Vitals:  CRNA VITALS  4/3/2019 1155 - 4/3/2019 1255      4/3/2019             NIBP:  112/70    Pulse:  94    NIBP Mean:  76    Temp:  37.2  C (99  F)    SpO2:  100 %    Resp Rate (observed):  10    EKG:  Sinus rhythm          Last PACU/Preop Vitals:  Vitals:    19 1700 19 1735 19 1750   BP: 91/59 93/44 91/47   Pulse: 64 65 61   Resp: 16 10 14   Temp:  35.6  C (96  F)    SpO2: 98% 98% 98%         Electronically Signed By: Alberta Velasco MD, April 3, 2019, 6:16 PM

## 2019-04-03 NOTE — OR NURSING
Talked with Dr. Nazario, notified of lab values, stated to redraw hemoglobin, potassium and get a sodium level. (want to rule out dilutional values) Call her with results.

## 2019-04-03 NOTE — ANESTHESIA CARE TRANSFER NOTE
Patient: Leeanne Duarte    Procedure(s):  Lumbar 3-4 Decompression, Lumbar 4-Sacral 1 Transforaminal Lumbar Interbody Fusion, Lumbar 5-Sacral 1 Kaminski Maxwell Osteotomy, Lumbar 4-Pelvis Instrumentation, Illiac Crest Autograft  [place] Bone Marrow From Iliac Crest    Diagnosis: Lumbar Stenosis and Neurogenic Claudication, Scoliosis and Spondylolisthesis with Radiculopathy  Diagnosis Additional Information: No value filed.    Anesthesia Type:   No value filed.     Note:  Airway :Face Mask  Patient transferred to:PACU  Handoff Report: Identifed the Patient, Identified the Reponsible Provider, Reviewed the pertinent medical history, Discussed the surgical course, Reviewed Intra-OP anesthesia mangement and issues during anesthesia, Set expectations for post-procedure period and Allowed opportunity for questions and acknowledgement of understanding      Vitals: (Last set prior to Anesthesia Care Transfer)    CRNA VITALS  4/3/2019 1155 - 4/3/2019 1233      4/3/2019             NIBP:  112/70    Pulse:  94    NIBP Mean:  76    Temp:  37.2  C (99  F)    SpO2:  100 %    Resp Rate (observed):  10    EKG:  Sinus rhythm                Electronically Signed By: ISREAL Miller P.G., CRNA  April 3, 2019  12:33 PM

## 2019-04-03 NOTE — BRIEF OP NOTE
[facility], [place]    Brief Operative Note    Pre-operative diagnosis: Lumbar Stenosis and Neurogenic Claudication, Scoliosis and Spondylolisthesis with Radiculopathy  Post-operative diagnosis * No post-op diagnosis entered *  Procedure: Procedure(s):  Lumbar 3-4 Decompression, Lumbar 4-Sacral 1 Transforaminal Lumbar Interbody Fusion, Lumbar 5-Sacral 1 Kaminski Maxwell Osteotomy, Lumbar 4-Pelvis Instrumentation, Illiac Crest Autograft  [place] Bone Marrow From Iliac Crest  Surgeon: Surgeon(s) and Role:     * Lane Ventura MD - Primary     * Neha Rogel MD - Resident - Assisting     * Francisca Uribe MD - Resident - Assisting  Anesthesia: General   Estimated blood loss: 600 ml  Drains: Hemovac and Sanju-Velasquez  Specimens: * No specimens in log *  Findings:   None.  Complications: None.    Ortho Primary  Activity: Until TLSO is in place, bed to chair only. May stand to don/doff brace. Up with assist until independent. No excessive bending or twisting. No lifting >10 lbs x 6 weeks. No Hilaria lift for transfers.   Weight bearing status: WBAT.   Pain management: Transition from IV to PO as tolerated. No NSAIDs  Antibiotics: Ancef x 24 hours.  Diet: Begin with clear fluids and progress diet as tolerated.   DVT prophylaxis: SCDs only. No chemical DVT ppx needed.  Imaging: XR Upright lumbosacral spine PTDC - ordered.  Labs: Hgb POD#1, 2 or until stable.  Bracing/Splinting: TLSO when out of bed (okay for bed to chair until TLSO in place. Okay to stand to don/doff brace). Anticipate 6 weeks of brace wear.  Dressings: Keep Aquacel c/d/i x 7 days.  Drains: Document output per shift, will be discontinued at Orthopedic Surgery discretion.  Arrieta catheter: Remove POD#1.   Physical Therapy/Occupational Therapy: Eval and treat.  Cultures: none  Consults: Hospitalist.  Follow-up: Clinic with Dr. Ventura in 6 weeks with repeat x-rays.   Disposition: Pending  progress with therapies, pain control on orals, and medical stability, anticipate discharge to home vs TCU on POD #3-5.

## 2019-04-03 NOTE — OR NURSING
PACU to Inpatient Nursing Handoff    Patient Leeanne Duarte is a 68 year old female who speaks English.   Procedure Procedure(s):  Lumbar 3-4 Decompression, Lumbar 4-Sacral 1 Transforaminal Lumbar Interbody Fusion, Lumbar 5-Sacral 1 Kaminski Maxwell Osteotomy, Lumbar 4-Pelvis Instrumentation, Illiac Crest Autograft  [place] Bone Marrow From Iliac Crest   Surgeon(s) Primary: Lane Ventura MD  Resident - Assisting: Francisca Uribe MD; Neha Rogel MD     Allergies   Allergen Reactions     Chocolate      Orange      Gabapentin Anxiety     Confusion         Isolation  No active isolations     Past Medical History   has no past medical history on file.    Anesthesia General   Dermatome Level     Preop Meds acetaminophen (Tylenol) - time given: 0619  gabapentin (Neurontin) - time given: 0619   Nerve block Not applicable   Intraop Meds dexamethasone (Decadron)  fentanyl (Sublimaze): 50 mcg total  hydromorphone (Dilaudid): 1 mg total  ondansetron (Zofran): last given at 1141   Local Meds No   Antibiotics cefazolin (Ancef) - last given at 1130     Pain Patient Currently in Pain: sleeping: patient not able to self report  Comfort: tolerable with discomfort  Pain Control: partially effective   PACU meds  acetaminophen (Tylenol): 975 mg (total dose) last given at 1441   hydromorphone (Dilaudid): 0.2 mg (total dose) last given at 1447    PCA / epidural Yes. PCA - hydromorphone (Dilaudid)   Capnography Respiratory Monitoring (EtCO2): 38 mmHg  Integrated Pulmonary Index (IPI): 7   Telemetry ECG Rhythm: Sinus rhythm   Inpatient Telemetry Monitor Ordered? No        Labs Glucose Lab Results   Component Value Date     04/03/2019       Hgb Lab Results   Component Value Date    HGB 8.4 04/03/2019       INR Lab Results   Component Value Date    INR 1.04 10/25/2010      PACU Imaging Not applicable     Wound/Incision Incision/Surgical Site 04/03/19 Back (Active)   Incision Assessment  [facility] 4/3/2019  3:00 PM   Closure Liquid bandage 4/3/2019 11:49 AM   Dressing Intervention Clean, dry, intact 4/3/2019  3:00 PM   Number of days: 0      CMS        Equipment ice pack   Other LDA       IV Access Peripheral IV 04/03/19 Left Lower forearm (Active)   Site Assessment [facility] 4/3/2019  1:30 PM   Line Status Saline locked 4/3/2019  1:30 PM   Phlebitis Scale 0-->no symptoms 4/3/2019  1:30 PM   Infiltration Scale 0 4/3/2019  1:30 PM   Number of days: 0       Peripheral IV 04/03/19 Right Hand (Active)   Site Assessment [facility] 4/3/2019  3:00 PM   Line Status Infusing 4/3/2019  3:00 PM   Phlebitis Scale 0-->no symptoms 4/3/2019  3:00 PM   Infiltration Scale 0 4/3/2019  3:00 PM   Number of days: 0      Blood Products Not applicable  mL   Intake/Output Date 04/03/19 0700 - 04/04/19 0659   Shift 2801-2812 4906-9404 2702-8787 24 Hour Total   INTAKE   I.V. 1600   1600   Other 155   155   Colloid 500   500   Shift Total(mL/kg) 2255(50.22)   2255(50.22)   OUTPUT   Urine 140   140   Drains 60   60   Blood 600   600   Shift Total(mL/kg) 800(17.82)   800(17.82)   Weight (kg) 44.9 44.9 44.9 44.9      Drains / Arrieta Closed/Suction Drain 1  Back Bulb 15 Pakistani (Active)   Site Description [facility] 4/3/2019  3:00 PM   Dressing Status Normal: Clean, Dry & Intact 4/3/2019  3:00 PM   Drainage Appearance Bloody/Bright Red 4/3/2019 12:28 PM   Status To bulb suction 4/3/2019 12:28 PM   Output (ml) 40 ml 4/3/2019  3:00 PM   Number of days: 0       Closed/Suction Drain 2  Back Accordion 10 Pakistani (Active)   Site Description [facility] 4/3/2019  3:00 PM   Dressing Status Normal: Clean, Dry & Intact 4/3/2019  3:00 PM   Drainage Appearance Bloody/Bright Red 4/3/2019 12:28 PM   Status Other (Comment) 4/3/2019 12:28 PM   Number of days: 0       Urethral Catheter Straight-tip;Latex 16 fr (Active)   Collection Container Standard 4/3/2019  3:00 PM   Securement Method Securing device (Describe) 4/3/2019  3:00 PM   Rationale for Continued Use Anesthesia  4/3/2019 12:28 PM   Urine Output 35 mL 4/3/2019  3:00 PM   Number of days: 0      Time of void PreOp      PostOp      Diapered? No   Bladder Scan     PO    water     Vitals    B/P: (!) 86/48  T: 97.8  F (36.6  C)    Temp src: Oral  P:  Pulse: 65 (04/03/19 1500)    Heart Rate: 66 (04/03/19 1500)     R: 12  O2:  SpO2: 99 %    O2 Device: Nasal cannula (04/03/19 1500)    Oxygen Delivery: 2 LPM (04/03/19 1500)         Family/support present significant other   Patient belongings     Patient transported on bed   DC meds/scripts (obs/outpt) Not applicable   Inpatient Pain Meds Released? Yes       Special needs/considerations None   Tasks needing completion None       Amy Suero, RN  [facility] [id number]

## 2019-04-03 NOTE — OP NOTE
DATE OF SURGERY: 4/3/2019    PREOPERATIVE DIAGNOSIS: Lumbar Stenosis and Neurogenic Claudication, Scoliosis and Spondylolisthesis with Radiculopathy             POSTOPERATIVE DIAGNOSIS: Same    PROCEDURES:  1. L3 through L4 laminectomy and partial medial facetectomies and foraminotomies.  2. L5-S1 Kaminski Maxwell Osteotomy for regional deformity correction.  3. Transforaminal lumbar interbody fusion at L4-5 and L5-S1, anterior and posterior fusion through a single approach.  4. L4-5 and L5-S1 Capstone PEEK Cage.  5. L4 through S1 Posterior Spinal Fusion.  6. L4 through Pelvis Posterior Spinal Instrumentation, Medtronic Solera.  7. [place] and use of Iliac Crest Autograft, which was harvested through a separate fascial incision.  8. Use of O-Arm navigational guidance.  9. Use of Allograft    PRIMARY SURGEON: Lane Ventura MD    FIRST ASSISTANT: Norah Uribe, PGY4, and Eleanor, PGY1.    ANESTHESIA: General Endotracheal    COMPLICATIONS:  None.    SPECIMENS: None.    ESTIMATED BLOOD LOSS: 600 mL with 150 returned via cell savor.    INDICATIONS:                          Leeanne Duarte is a 68 year old female who elected surgical treatment, and understood the indications for this surgery, as well as its risks, benefits, and alternatives as documented in the pre-operative H&P.  Specifically, we reviewed the risks and benefits of the surgery in detail. The risks include, but are not limited to, the general risks associated with anesthesia, including death, pulmonary embolism, DVT, stroke, myocardial infarction, pneumonia, and urinary tract infection. Additional risks specific to the surgery include the risk of infection, failure to heal (non-union), dural tear with resultant CSF leak which might necessitate placement of a drain or revision surgery or could result in headaches, nerve injury resulting in weakness or paralysis, risk of adjacent segment disease, the risks of vascular injury, need for revision  surgery in the future due to one of the above issues, or risk of incomplete symptom relief. Leeanne Duarte understands the risks of the surgery and wishes to proceed. No guarantees were given.    DESCRIPTION OF PROCEDURE:           Leeanne Duarte was taken to the operating  room, where the Anesthesiology Service induced satisfactory general anesthesia.  Ancef was given IV.  Venous thromboembolic prophylaxis was performed with sequential devices.  A Arrieta catheter was placed under standard sterile techniques.  The patient was placed prone on an open OSI frame with the abdomen hanging free and all bony prominences well padded.  The low back was then prepped and draped in its entirety in the usual sterile fashion. We then held a multidisciplinary time out in which we verified the patient, procedure, antibiotics, and operative plan.  All team members were in agreement.     The incision was carried out from L4 to S2 with subperiosteal dissection out to the tips of the transverse processes.  I also exposed the medial lamina of L3.  Intraoperative radiographic localization of the levels was again confirmed using fluoroscopy.      I harvested iliac crest autograft from the patient's right  side through the same skin incision.  I incised the fascia overlying the PSIS and elecated it sub-periosteally down the outer table of the ilium for a distance of approximately 5cm.  A Suzanna retractor was then placed over the outer table to maintain visualization.  An osteotome was used to make two vertical cuts 3 cm apart, followed by a transverse cut that split the innner and outer tables of the ilium.  This removed the outer cortex, which was saved.  I then used a combination of currettes to harvest the needed amount of cancellous autograft.  Hemostasis was achieved, the wound was thoroughly irrigated, and I then closed the fascia using vicryl sutures.  I did place vancomycin powder and bone wax into the space along with  Marcaine soaked epinephrine.    We then turned our attention to the placement of pedicle screws.  The reference frame was attached to the spinous process at L4.  We then performed our O-Arm spin.  After appropriate verification, the screws were placed at each level bilaterally from L4 through S1 in the following fashion: We used the clawfoot to identify the starting point.  A high-speed bur was used to penetrate the dorsal cortex.  We then used the gold handle pedicle probe to cannulate the pedicle while referencing the navigation screen for guidance.  The navigation system was used to measure the pedicle diameter and length, and an appropriate Medtronic Solera screw size was chosen.  We under tapped 1mm.  This continued bilaterally at each level until all screw tracts were prepared.  The screws were then placed at each level bilaterally.  I then turned my attention to the placement of the pelvic fixation.  He is a navigated ALT across the sacroiliac joint.  I then used a 6.5 mm tap followed by 9.5 mm tap and then the screw was placed under power.  This was done bilaterally into the pelvis.    Screw lengths were as follows:  7.5 x 55 mm bilaterally L4  7.5 x 50 on the left and 7.5 x 55 on the right at L5  7.5 x 40 bilaterally at S1  9.5 x 90 bilaterally in the pelvis     The O-Arm was brought back in and we performed a verification spin.  The resultant CT showed the instrumentation to be completely intra-osseous with no obvious breaches or malposition.  This also verified that the correct level had been operated upon.     I then turned my attention to the Smith-Sampson osteotomy and interbody fusion portion of the procedure.  I took down the interspinous ligament at L5-S1.  I distracted the interspace with a lamina .  I then used an osteotome to remove the bilateral inferior articular processes.  I then started on the left side and used a rondure to take down the superior articular process and then I  dissected around it with a curette and took it out piecemeal with a Kerrison until the entire superior articular process on the left side had been removed.  Identified the traversing nerve root and took down the ligamentum flavum in the midline.  I protected the traversing nerve root and then entered the disc space with a combination of pituitaries and scrapers and then turn and rotate distractors.  I then turned my attention over to the right side.  I working in outside in fashion to remove the entire right-sided superior articular process.  The removal of the bilateral superior articular processes completed the osteotomy at this level for regional deformity correction.  I then entered the right-sided disc space again with turn and rotate distractors.  Once the disc was thoroughly cleaned I trialed and we settled upon a 13 mm x 22 mm 18 degree lordotic cage.  I inserted the right-sided cage and then went back to the left side.  The distractor was removed from the left side and I then placed 6 cc of local autograft into the disc space between the 2 cages and then inserted the left-sided cage.  I then inserted an additional 3 cc of autograft and allograft mixture lateral to the cages on both sides for a total of 12 cc of bone graft at the L5-S1 disc.  We verified the position of the cages fluoroscopically and this completed the interbody fusion at this level.    I then turned my attention to the L4-5 level.  I chose to only place a unilateral cage on the right side for deformity correction at this level.  I wanted to minimize the risk of fracture so I chose not to do a Smith-Sampson osteotomy at this level.  I took down the inferior articular process of L4 with an osteotome and then worked in an outside in fashion around the superior articular process of L5 and remove the entire right-sided facet joint.  I inserted a lamina  and then into the disc space with a combination of pituitaries and scrapers and  thoroughly cleaned out the disc.  I then thoroughly trialed and settled upon a 13 mm x 22 mm 18 degree lordotic cage.  I packed 6 cc of local autograft medial to the cage and then inserted the cage and then packed an additional 3 cc of autograft lateral to the cage.  I verified the cage position fluoroscopically.  This completed the interbody fusion at L4-5.    I then turned my attention to the L3-4 decompression.  I used a narrow rondure to take down the interspinous ligament.  A rongure was used to thin the dorsal cortex of L3 and L4.  I then used a bur to take down the remaining bone to the level of the ligamentum flavum.  The ligamentum flavum was removed piecemeal.  This allowed me to enter the canal.  I continued my dissection into the lateral recess and I undercut the L3-4 facet joint.  I used a 2 mm Kerrison to go below the pedicle and make sure that the exiting L4 nerve root was totally free.  I then used a 2 mm Kerrison above the pedicle to make sure that the exiting L3 nerve root was totally free.  This was done bilaterally and completed the decompression of the L3 and L4 nerves.    We thoroughly irrigated.  Bilateral rods and setscrews were placed and I went through a round of serial reduction maneuvers to correct her coronal plane deformity.  Once I was satisfied the setscrews were finally tightened.  We took final fluoroscopic images.    I then decorticated the remaining left-sided lamina and facet joint at L4-5 as well as onto the lamina of S1.  I packed in the 40 cc of autograft in this location and augmented it with about 15 cc of allograft.  This completed the posterior fusion from L4-S1.    We then achieved hemostasis.  A AJAY drain was placed deep to the fascia and 2 gram of vancomycin powder was placed into the wound.  We closed the wound in multiple layers of interrupted Vicryl, with monocryl and dermabond for the skin.  A 1/8 hemovac was placed above the fascia. Upon closure, the patient was  then transferred to a hospital bed and taken to recovery after extubation in stable condition.    I was present and scrubbed for the entire procedure.    Lane Ventura MD

## 2019-04-03 NOTE — CONSULTS
HOSPITALIST CONSULTATION     REQUESTING PHYSICIAN: Lane Ventura MD    REASON FOR CONSULTATION: Evaluation, Recommendations and co management of Medical Comorbidities.     ASSESSMENT & PLAN :     Leeanne Duarte is a 68 year old female admitted on 4/3/2019 following procedure, s/p following procedure:    Pre-operative diagnosis:         Lumbar Stenosis and Neurogenic Claudication, Scoliosis and Spondylolisthesis with Radiculopathy    Procedure:      Procedure(s):  Lumbar 3-4 Decompression, Lumbar 4-Sacral 1 Transforaminal Lumbar Interbody Fusion, Lumbar 5-Sacral 1 Kaminski Maxwell Osteotomy, Lumbar 4-Pelvis Instrumentation, Illiac Crest Autograft  [place] Bone Marrow From Iliac Crest  Surgeon:         Surgeon(s) and Role:     * Lane Ventura MD - Primary       Anesthesia:     General             Estimated blood loss:  600 ml  Drains: Hemovac and Sanju-Velasquez  Complications:            None      Past medical history, preop evaluation reviewed.  Patient currently have low blood pressure, however denies any symptoms.  No lightheadedness or dizziness.  No chest pain or shortness of breath.  Patient reports having low blood pressure at baseline.  Also noted a low sodium and low potassium.  Pain controlled.  No significant cardiac or pulmonary history.      #Hemodynamics: Low blood pressure, asymptomatic.  Lactic acid normal.  Runs at 90s to low 100 systolic at baseline.  Suspect related to hypovolemia, acute blood loss anemia, medications-Anesthesia, narcotics.    -Try fluid challenge of normal saline total 1 L bolus, continue on 125 units/h after bolus.  -Use right size blood pressure cuff   -Monitor vitals frequently, I.O.   -Telemetry monitor.  -Follow-up hemoglobin.   -Careful use of narcotics.     Addendum: Follow-up hemoglobin 7.5.  Downtrending.  Blood pressure still low despite fluid bolus.  We will give 1 unit packed RBC.  Continue to  monitor.    #Hyponatremia, hypokalemia: Likely from hypokalemia/dehydration.  Magnesium 1.8.  - follow-up with Normal saline hydration.  -Replace potassium per protocol.     # Orthopedics:  POD # 0  Hemodynamics: stable.   continue on IV fluids, until adequate PO.     Analgesia: Per Orthopedic team.   DVT prophylaxis:- Per orthopedic team  Activity per orthopedic team.   Antibiotics and wound care as per primary team.   Encourage Incentive spirometry to prevent atelectasis   Minimize use of narcotics as able. Consider bowel regimen with narcotics.       Thank you for the consultation  Please page with any questions.      Jonathan Sahu MD  House Physician  Pager: [phone]        CHIEF COMPLAINT: Says doing well.    HISTORY OF PRESENT ILLNESS: Obtained from the patient and chart review including Pre op evaluation, procedure note.    68 year old year old female  with above discussed medical problems s/p above spine procedure admitted on 4/3/2019  for post op care and monitoring  (for further details for indication of surgery and operative note, please refer to Lane Ventura MD note). Medicine consulted to evaluate, recommend and/or co manage medical co morbidities.  Hypertension postop as above. No hypoxemia or other significant complications intra or post operative.   Currently: Incisional Pain controlled. Denies any chest pain, shortness of breath or LH or palpitations. Denies any nausea, vomiting or pain abdomen. No fever or chills. Denies any dysuria.  Denies any new rash.   Medical issues as discussed above.   Denies any other medical concern.     PAST MEDICAL HISTORY:   History reviewed. No pertinent past medical history.    PAST SURGICAL HISTORY:   Past Surgical History:   Procedure Laterality Date     CATARACT IOL, RT/LT  2014       FH: reviewed.     Family History   Problem Relation Age of Onset     Colon Cancer Maternal Grandmother      Colon Cancer Maternal Grandfather      High  cholesterol Mother      Hypertension Mother      Myocardial Infarction Mother 72     High cholesterol Father      Hypertension Father      Myocardial Infarction Father 76        SH: reviewed.     Social History     Socioeconomic History     Marital status:      Spouse name: None     Number of children: None     Years of education: None     Highest education level: None   Occupational History     None   Social Needs     Financial resource strain: None     Food insecurity:     Worry: None     Inability: None     Transportation needs:     Medical: None     Non-medical: None   Tobacco Use     Smoking status: Never Smoker     Smokeless tobacco: Never Used   Substance and Sexual Activity     Alcohol use: Yes     Comment: 7 per week     Drug use: No     Sexual activity: No   Lifestyle     Physical activity:     Days per week: None     Minutes per session: None     Stress: None   Relationships     Social connections:     Talks on phone: None     Gets together: None     Attends [employer] service: None     Active member of club or organization: None     Attends meetings of clubs or organizations: None     Relationship status: None     Intimate partner violence:     Fear of current or ex partner: None     Emotionally abused: None     Physically abused: None     Forced sexual activity: None   Other Topics Concern     Parent/sibling w/ CABG, MI or angioplasty before 65F 55M? Not Asked   Social History Narrative    How much exercise per week? 1/2 -1 hr daily    How much calcium per day? Supplement + 3 glasses milk       How much caffeine per day? 0    How much vitamin D per day? supplement    Do you/your family wear seatbelts?  Yes    Do you/your family use safety helmets? No    Do you/your family use sunscreen? Yes    Do you/your family keep firearms in the home? No    Do you/your family have a smoke detector(s)? Yes        Do you feel safe in your home? Yes    Has anyone ever touched you in an unwanted manner? No      Explain         November 13, 2014 Aida Yoder LPN               ALLERGIES:   Allergies   Allergen Reactions     Chocolate      Orange      Gabapentin      Confusion           HOME MEDICATIONS:     Prior to Admission medications    Medication Sig Start Date End Date Taking? Authorizing Provider   Ascorbic Acid (VITAMIN C) 500 MG CAPS Take  by mouth daily.   Yes Reported, Patient   Calcium Citrate-Vitamin D (CALCIUM CITRATE + D) 300-100 MG-UNIT TABS Take by mouth 3 times daily Take 2 tablets in the AM and 2 tablets in the PM. Vit D 400 IU, Calcium 500 mg   Yes Reported, Patient   escitalopram (LEXAPRO) 10 MG tablet take 1 tablet every morning 10/8/18  Yes Reported, Patient   IBUPROFEN PO Take by mouth as needed for moderate pain   Yes Reported, Patient   LORAZEPAM PO Take 0.5 mg by mouth 2 times daily as needed for anxiety   Yes Reported, Patient   FLUAD 0.5 ML TALI ADM 0.5ML IM UTD 11/15/18   Reported, Patient       CURRENT MEDICATIONS:    Current Facility-Administered Medications   Medication     [START ON 4/6/2019] acetaminophen (TYLENOL) tablet 650 mg     acetaminophen (TYLENOL) tablet 975 mg     benzocaine-menthol (CEPACOL) 15-3.6 MG lozenge 1 lozenge     bisacodyl (DULCOLAX) Suppository 10 mg     ceFAZolin (ANCEF) 1 g vial to attach to  ml bag for ADULT or 50 ml bag for PEDS     diazepam (VALIUM) tablet 5 mg     diphenhydrAMINE (BENADRYL) solution 12.5 mg    Or     diphenhydrAMINE (BENADRYL) injection 12.5 mg     escitalopram (LEXAPRO) tablet 10 mg     ranitidine (ZANTAC) tablet 150 mg    Or     famotidine (PEPCID) infusion 20 mg     gabapentin (NEURONTIN) capsule 100 mg     HYDROmorphone (DILAUDID) PCA 1 mg/mL OPIOID NAIVE     [START ON 4/4/2019] HYDROmorphone (PF) (DILAUDID) injection 0.2 mg     lidocaine (LMX4) cream     lidocaine 1 % 0.1-1 mL     magnesium sulfate 4 g in 100 mL sterile water (premade)     metoclopramide (REGLAN) tablet 5 mg    Or     metoclopramide (REGLAN) injection 5 mg      "naloxone (NARCAN) injection 0.1-0.4 mg     ondansetron (ZOFRAN-ODT) ODT tab 4 mg    Or     ondansetron (ZOFRAN) injection 4 mg     oxyCODONE (ROXICODONE) tablet 5-10 mg     polyethylene glycol (MIRALAX/GLYCOLAX) Packet 17 g     potassium chloride (KLOR-CON) Packet 20-40 mEq     potassium chloride 10 mEq in 100 mL intermittent infusion with 10 mg lidocaine     potassium chloride 10 mEq in 100 mL sterile water intermittent infusion (premix)     potassium chloride 20 mEq in 50 mL intermittent infusion     potassium chloride ER (K-DUR/KLOR-CON M) CR tablet 20-40 mEq     prochlorperazine (COMPAZINE) injection 5 mg    Or     prochlorperazine (COMPAZINE) tablet 5 mg    Or     prochlorperazine (COMPAZINE) Suppository 12.5 mg     senna-docusate (SENOKOT-S/PERICOLACE) 8.6-50 MG per tablet 2 tablet     sodium chloride (PF) 0.9% PF flush 3 mL     sodium chloride (PF) 0.9% PF flush 3 mL     sodium chloride 0.9% infusion     [START ON 2019] vitamin C (ASCORBIC ACID) tablet 500 mg         ROS: 10 point ROS neg other than the symptoms noted above in the HPI.    PHYSICAL EXAMINATION:     BP 91/47 (BP Location: Left arm)   Pulse 61   Temp 96  F (35.6  C) (Oral)   Resp 14   Ht 1.473 m (4' 10\")   Wt 44.9 kg (98 lb 15.8 oz)   SpO2 98%   BMI 20.69 kg/m    Temp (24hrs), Av.7  F (36.5  C), Min:96  F (35.6  C), Max:99  F (37.2  C)      BMI= Body mass index is 20.69 kg/m .      Intake/Output Summary (Last 24 hours) at 4/3/2019 1836  Last data filed at 4/3/2019 1500  Gross per 24 hour   Intake 2555 ml   Output 875 ml   Net 1680 ml       General: Alert, interactive, NAD.  Thin built.  HEENT: AT/NC. PERRLA. Anicteric.Moist MM.    Neck: Supple. No JVD. No Lymphadenopathy.  Heart/CVS: Normal S1 and S2. Regular. No m/r/g .   Chest/Respi: Non labored breathing. CTA BL.   Abdomen/GI: Soft, non distended, non tender  Extremities/MSK: Distal pulses 2+, well perfused. Rest per ortho.   Neuro: Alert and oriented x4. Cranial nerves II-XII " are grossly intact.    Skin:  No new rash over exposed areas.       LABORATORY DATA: reviewed.     Recent Results (from the past 24 hour(s))   Glucose by meter    Collection Time: 04/03/19  5:57 AM   Result Value Ref Range    Glucose 127 (H) 70 - 99 mg/dL   EKG 12-lead, tracing only    Collection Time: 04/03/19  6:43 AM   Result Value Ref Range    Interpretation ECG Click View Image link to view waveform and result    Basic metabolic panel    Collection Time: 04/03/19  1:50 PM   Result Value Ref Range    Sodium 129 (L) 133 - 144 mmol/L    Potassium 2.8 (L) 3.4 - 5.3 mmol/L    Chloride 87 (L) 94 - 109 mmol/L    Carbon Dioxide 34 (H) 20 - 32 mmol/L    Anion Gap 8 3 - 14 mmol/L    Glucose 133 (H) 70 - 99 mg/dL    Urea Nitrogen 8 7 - 30 mg/dL    Creatinine 0.86 0.52 - 1.04 mg/dL    GFR Estimate 69 >60 mL/min/[1.73_m2]    GFR Estimate If Black 80 >60 mL/min/[1.73_m2]    Calcium 7.5 (L) 8.5 - 10.1 mg/dL   CBC with platelets    Collection Time: 04/03/19  1:50 PM   Result Value Ref Range    WBC 8.1 4.0 - 11.0 10e9/L    RBC Count 3.76 (L) 3.8 - 5.2 10e12/L    Hemoglobin 8.7 (L) 11.7 - 15.7 g/dL    Hematocrit 27.5 (L) 35.0 - 47.0 %    MCV 73 (L) 78 - 100 fl    MCH 23.1 (L) 26.5 - 33.0 pg    MCHC 31.6 31.5 - 36.5 g/dL    RDW 18.0 (H) 10.0 - 15.0 %    Platelet Count 209 150 - 450 10e9/L   Potassium    Collection Time: 04/03/19  2:40 PM   Result Value Ref Range    Potassium 3.0 (L) 3.4 - 5.3 mmol/L   Sodium    Collection Time: 04/03/19  2:40 PM   Result Value Ref Range    Sodium 129 (L) 133 - 144 mmol/L   Hemoglobin    Collection Time: 04/03/19  2:40 PM   Result Value Ref Range    Hemoglobin 8.4 (L) 11.7 - 15.7 g/dL       Recent Results (from the past 24 hour(s))   XR Surgery YAMILETH Fluoro L/T 5 Min    Narrative    This exam was marked as non-reportable because it will not be read by a   radiologist or a [place] non-radiologist provider.                     Jonathan Sahu MD

## 2019-04-03 NOTE — ANESTHESIA PREPROCEDURE EVALUATION
Anesthesia Pre-Procedure Evaluation    Patient: Leeanne Duarte   MRN:     [id number] Gender:   female   Age:    68 year old :      1950        Preoperative Diagnosis: Lumbar Stenosis and Neurogenic Claudication, Scoliosis and Spondylolisthesis with Radiculopathy   Procedure(s):  Lumbar 4-Sacral 1 Transforaminal Lumbar Interbody Fusion (Decompression and Fusion) Lumbar 3-4 Decompression Use Of BMP, O-Arm/Stealth     History reviewed. No pertinent past medical history.   Past Surgical History:   Procedure Laterality Date     CATARACT IOL, RT/LT            Anesthesia Evaluation     .             ROS/MED HX    ENT/Pulmonary:       Neurologic:       Cardiovascular:         METS/Exercise Tolerance:     Hematologic:         Musculoskeletal:   (+) arthritis, , , -       GI/Hepatic:     (+) Inflammatory bowel disease,       Renal/Genitourinary:         Endo:         Psychiatric:         Infectious Disease:         Malignancy:         Other:                         PHYSICAL EXAM:   Mental Status/Neuro:    Airway: Facies: Feasible  Mallampati: II  Mouth/Opening: Full  TM distance: < 6 cm  Neck ROM: Limited   Respiratory: Auscultation: CTAB     Resp. Rate: Normal     Resp. Effort: Normal      CV: Rhythm: Regular  Rate: Age appropriate  Heart: Normal Sounds   Comments:      Dental: Normal                  Lab Results   Component Value Date    WBC 4.7 2019    HGB 9.8 (L) 2019    HCT 31.0 (L) 2019     2019    CRP 6.8 10/07/2009    SED 25 2009     (L) 2019    POTASSIUM 4.7 2019    CHLORIDE 99 2019    CO2 28 2019    BUN 6 (L) 2019    CR 0.73 2019    GLC 78 2019    LIVE 8.6 2019    PHOS 1.5 (L) 10/27/2010    MAG 1.9 2016    ALBUMIN 3.0 (L) 2016    PROTTOTAL 6.8 2016    ALT 17 2016    AST 22 2016    ALKPHOS 68 2016    BILITOTAL 0.3 2016    LIPASE 243 10/24/2010    PTT 30 10/07/2009     "INR 1.04 10/25/2010    TSH 5.34 (H) 07/11/2016    T4 0.77 03/12/2010    T3 151 03/12/2010       Preop Vitals  BP Readings from Last 3 Encounters:   04/03/19 106/60   03/12/19 108/57   10/04/16 101/54    Pulse Readings from Last 3 Encounters:   03/12/19 71   10/04/16 61   05/23/16 65      Resp Readings from Last 3 Encounters:   04/03/19 14   10/04/16 16   05/23/16 16    SpO2 Readings from Last 3 Encounters:   04/03/19 98%   03/12/19 99%   10/04/16 98%      Temp Readings from Last 1 Encounters:   04/03/19 36.6  C (97.9  F) (Oral)    Ht Readings from Last 1 Encounters:   04/03/19 1.473 m (4' 10\")      Wt Readings from Last 1 Encounters:   04/03/19 44.9 kg (98 lb 15.8 oz)    Estimated body mass index is 20.69 kg/m  as calculated from the following:    Height as of this encounter: 1.473 m (4' 10\").    Weight as of this encounter: 44.9 kg (98 lb 15.8 oz).     LDA:            Assessment:   ASA SCORE: 2    NPO Status: > 2 hours since completed Clear Liquids; > 6 hours since completed Solid Foods   Documentation: H&P complete; Preop Testing complete; Consents complete   Proceeding: Proceed without further delay  Tobacco Use:  NO Active use of Tobacco/UNKNOWN Tobacco use status     Plan:   Anes. Type:  General   Pre-Induction: Acetaminophen PO   Induction:  IV (Standard)   Airway: Oral ETT   Access/Monitoring: PIV   Maintenance: Balanced   Emergence: Procedure Site   Logistics: Observation/Admission     Postop Pain/Sedation Strategy:  Standard-Options: Opioids PRN     PONV Management:  Adult Risk Factors: Female, Non-Smoker, Postop Opioids  Prevention: Ondansetron     CONSENT: Direct conversation   Plan and risks discussed with: Patient; Spouse   Blood Products: Consent Deferred (Minimal Blood Loss)       Comments for Plan/Consent:  Risks of POVL discussed.                          Alberta Velasco MD  "

## 2019-04-03 NOTE — OR NURSING
Talked with Dr. Nazario, notified of BP reading of 85/55, map 65. BP has now come up to 90/54. Dr. Nazario stated to give 300mL LR bolus now, order CBC and BMP now. Notified of urine output of 40cc over the last hour and updated on AJAY drain output.     Pt continues to be drowsy post surgery, arousable but needs frequent encouragement to take deep breaths. Discussed patients age and duration of surgery.  No other new orders from Dr. Nazario.

## 2019-04-04 ENCOUNTER — APPOINTMENT (OUTPATIENT)
Dept: PHYSICAL THERAPY | Facility: CLINIC | Age: 69
DRG: 454 | End: 2019-04-04
Attending: ORTHOPAEDIC SURGERY
Payer: COMMERCIAL

## 2019-04-04 LAB
ABO + RH BLD: NORMAL
ABO + RH BLD: NORMAL
ANION GAP SERPL CALCULATED.3IONS-SCNC: 7 MMOL/L (ref 3–14)
BASOPHILS # BLD AUTO: 0 10E9/L (ref 0–0.2)
BASOPHILS NFR BLD AUTO: 0.1 %
BLD GP AB SCN SERPL QL: NORMAL
BLD PROD TYP BPU: NORMAL
BLD PROD TYP BPU: NORMAL
BLD UNIT ID BPU: 0
BLOOD BANK CMNT PATIENT-IMP: NORMAL
BLOOD BANK CMNT PATIENT-IMP: NORMAL
BLOOD PRODUCT CODE: NORMAL
BPU ID: NORMAL
BUN SERPL-MCNC: 9 MG/DL (ref 7–30)
CALCIUM SERPL-MCNC: 6.6 MG/DL (ref 8.5–10.1)
CHLORIDE SERPL-SCNC: 100 MMOL/L (ref 94–109)
CO2 SERPL-SCNC: 24 MMOL/L (ref 20–32)
CREAT SERPL-MCNC: 0.7 MG/DL (ref 0.52–1.04)
DIFFERENTIAL METHOD BLD: ABNORMAL
EOSINOPHIL # BLD AUTO: 0 10E9/L (ref 0–0.7)
EOSINOPHIL NFR BLD AUTO: 0.1 %
ERYTHROCYTE [DISTWIDTH] IN BLOOD BY AUTOMATED COUNT: 19.7 % (ref 10–15)
GFR SERPL CREATININE-BSD FRML MDRD: 87 ML/MIN/{1.73_M2}
GLUCOSE SERPL-MCNC: 89 MG/DL (ref 70–99)
HCT VFR BLD AUTO: 26.7 % (ref 35–47)
HGB BLD-MCNC: 8.2 G/DL (ref 11.7–15.7)
HGB BLD-MCNC: 8.6 G/DL (ref 11.7–15.7)
IMM GRANULOCYTES # BLD: 0 10E9/L (ref 0–0.4)
IMM GRANULOCYTES NFR BLD: 0.1 %
LYMPHOCYTES # BLD AUTO: 0.8 10E9/L (ref 0.8–5.3)
LYMPHOCYTES NFR BLD AUTO: 10.7 %
MCH RBC QN AUTO: 24.7 PG (ref 26.5–33)
MCHC RBC AUTO-ENTMCNC: 32.2 G/DL (ref 31.5–36.5)
MCV RBC AUTO: 77 FL (ref 78–100)
MONOCYTES # BLD AUTO: 0.8 10E9/L (ref 0–1.3)
MONOCYTES NFR BLD AUTO: 10 %
NEUTROPHILS # BLD AUTO: 6 10E9/L (ref 1.6–8.3)
NEUTROPHILS NFR BLD AUTO: 79 %
NRBC # BLD AUTO: 0.1 10*3/UL
NRBC BLD AUTO-RTO: 1 /100
NUM BPU REQUESTED: 1
PLATELET # BLD AUTO: 173 10E9/L (ref 150–450)
POTASSIUM SERPL-SCNC: 4.3 MMOL/L (ref 3.4–5.3)
RBC # BLD AUTO: 3.48 10E12/L (ref 3.8–5.2)
SODIUM SERPL-SCNC: 131 MMOL/L (ref 133–144)
SPECIMEN EXP DATE BLD: NORMAL
TRANSFUSION STATUS PATIENT QL: NORMAL
TRANSFUSION STATUS PATIENT QL: NORMAL
WBC # BLD AUTO: 7.6 10E9/L (ref 4–11)

## 2019-04-04 PROCEDURE — 25800030 ZZH RX IP 258 OP 636: Performed by: INTERNAL MEDICINE

## 2019-04-04 PROCEDURE — 99207 ZZC CONSULT E&M CHANGED TO SUBSEQUENT LEVEL: CPT | Performed by: INTERNAL MEDICINE

## 2019-04-04 PROCEDURE — 12000001 ZZH R&B MED SURG/OB UMMC

## 2019-04-04 PROCEDURE — 85025 COMPLETE CBC W/AUTO DIFF WBC: CPT | Performed by: STUDENT IN AN ORGANIZED HEALTH CARE EDUCATION/TRAINING PROGRAM

## 2019-04-04 PROCEDURE — 80048 BASIC METABOLIC PNL TOTAL CA: CPT | Performed by: STUDENT IN AN ORGANIZED HEALTH CARE EDUCATION/TRAINING PROGRAM

## 2019-04-04 PROCEDURE — 97530 THERAPEUTIC ACTIVITIES: CPT | Mod: GP | Performed by: PHYSICAL THERAPIST

## 2019-04-04 PROCEDURE — 25000128 H RX IP 250 OP 636: Performed by: STUDENT IN AN ORGANIZED HEALTH CARE EDUCATION/TRAINING PROGRAM

## 2019-04-04 PROCEDURE — 97161 PT EVAL LOW COMPLEX 20 MIN: CPT | Mod: GP | Performed by: PHYSICAL THERAPIST

## 2019-04-04 PROCEDURE — 36415 COLL VENOUS BLD VENIPUNCTURE: CPT | Performed by: STUDENT IN AN ORGANIZED HEALTH CARE EDUCATION/TRAINING PROGRAM

## 2019-04-04 PROCEDURE — P9016 RBC LEUKOCYTES REDUCED: HCPCS | Performed by: PHYSICIAN ASSISTANT

## 2019-04-04 PROCEDURE — 25000128 H RX IP 250 OP 636: Performed by: INTERNAL MEDICINE

## 2019-04-04 PROCEDURE — 36415 COLL VENOUS BLD VENIPUNCTURE: CPT | Performed by: INTERNAL MEDICINE

## 2019-04-04 PROCEDURE — 99232 SBSQ HOSP IP/OBS MODERATE 35: CPT | Performed by: INTERNAL MEDICINE

## 2019-04-04 PROCEDURE — 25800030 ZZH RX IP 258 OP 636

## 2019-04-04 PROCEDURE — 25000132 ZZH RX MED GY IP 250 OP 250 PS 637: Performed by: INTERNAL MEDICINE

## 2019-04-04 PROCEDURE — 25000132 ZZH RX MED GY IP 250 OP 250 PS 637: Performed by: STUDENT IN AN ORGANIZED HEALTH CARE EDUCATION/TRAINING PROGRAM

## 2019-04-04 PROCEDURE — 85018 HEMOGLOBIN: CPT | Performed by: INTERNAL MEDICINE

## 2019-04-04 RX ORDER — HYDROXYZINE HYDROCHLORIDE 25 MG/1
25 TABLET, FILM COATED ORAL EVERY 6 HOURS PRN
Status: DISCONTINUED | OUTPATIENT
Start: 2019-04-04 | End: 2019-04-08 | Stop reason: HOSPADM

## 2019-04-04 RX ORDER — CALCIUM CARBONATE 500 MG/1
500 TABLET, CHEWABLE ORAL DAILY PRN
Status: DISCONTINUED | OUTPATIENT
Start: 2019-04-04 | End: 2019-04-08 | Stop reason: HOSPADM

## 2019-04-04 RX ORDER — SODIUM CHLORIDE 9 MG/ML
INJECTION, SOLUTION INTRAVENOUS
Status: COMPLETED
Start: 2019-04-04 | End: 2019-04-04

## 2019-04-04 RX ORDER — LANOLIN ALCOHOL/MO/W.PET/CERES
3 CREAM (GRAM) TOPICAL
Status: DISCONTINUED | OUTPATIENT
Start: 2019-04-04 | End: 2019-04-08 | Stop reason: HOSPADM

## 2019-04-04 RX ADMIN — SENNOSIDES AND DOCUSATE SODIUM 2 TABLET: 8.6; 5 TABLET ORAL at 08:20

## 2019-04-04 RX ADMIN — OXYCODONE HYDROCHLORIDE AND ACETAMINOPHEN 500 MG: 500 TABLET ORAL at 08:15

## 2019-04-04 RX ADMIN — GABAPENTIN 100 MG: 100 CAPSULE ORAL at 08:15

## 2019-04-04 RX ADMIN — OXYCODONE HYDROCHLORIDE 5 MG: 5 TABLET ORAL at 21:13

## 2019-04-04 RX ADMIN — SODIUM CHLORIDE: 9 INJECTION, SOLUTION INTRAVENOUS at 04:15

## 2019-04-04 RX ADMIN — SODIUM CHLORIDE 500 ML: 9 INJECTION, SOLUTION INTRAVENOUS at 00:05

## 2019-04-04 RX ADMIN — SODIUM CHLORIDE: 9 INJECTION, SOLUTION INTRAVENOUS at 02:14

## 2019-04-04 RX ADMIN — SODIUM CHLORIDE: 9 INJECTION, SOLUTION INTRAVENOUS at 22:42

## 2019-04-04 RX ADMIN — CEFAZOLIN 1 G: 1 INJECTION, POWDER, FOR SOLUTION INTRAMUSCULAR; INTRAVENOUS at 08:15

## 2019-04-04 RX ADMIN — SENNOSIDES AND DOCUSATE SODIUM 2 TABLET: 8.6; 5 TABLET ORAL at 21:13

## 2019-04-04 RX ADMIN — OXYCODONE HYDROCHLORIDE 5 MG: 5 TABLET ORAL at 17:24

## 2019-04-04 RX ADMIN — ACETAMINOPHEN 975 MG: 325 TABLET, FILM COATED ORAL at 21:13

## 2019-04-04 RX ADMIN — SODIUM CHLORIDE: 9 INJECTION, SOLUTION INTRAVENOUS at 14:03

## 2019-04-04 RX ADMIN — ACETAMINOPHEN 975 MG: 325 TABLET, FILM COATED ORAL at 05:17

## 2019-04-04 RX ADMIN — OXYCODONE HYDROCHLORIDE 5 MG: 5 TABLET ORAL at 12:25

## 2019-04-04 RX ADMIN — POLYETHYLENE GLYCOL 3350 17 G: 17 POWDER, FOR SOLUTION ORAL at 08:27

## 2019-04-04 RX ADMIN — ACETAMINOPHEN 975 MG: 325 TABLET, FILM COATED ORAL at 12:25

## 2019-04-04 RX ADMIN — RANITIDINE 150 MG: 150 TABLET ORAL at 21:13

## 2019-04-04 RX ADMIN — POTASSIUM CHLORIDE 20 MEQ: 750 TABLET, EXTENDED RELEASE ORAL at 02:16

## 2019-04-04 RX ADMIN — POTASSIUM CHLORIDE 40 MEQ: 750 TABLET, EXTENDED RELEASE ORAL at 00:21

## 2019-04-04 RX ADMIN — SODIUM CHLORIDE 1000 ML: 9 INJECTION, SOLUTION INTRAVENOUS at 16:34

## 2019-04-04 RX ADMIN — RANITIDINE 150 MG: 150 TABLET ORAL at 08:15

## 2019-04-04 RX ADMIN — CEFAZOLIN 1 G: 1 INJECTION, POWDER, FOR SOLUTION INTRAMUSCULAR; INTRAVENOUS at 16:30

## 2019-04-04 RX ADMIN — ESCITALOPRAM OXALATE 10 MG: 10 TABLET ORAL at 21:13

## 2019-04-04 ASSESSMENT — ACTIVITIES OF DAILY LIVING (ADL)
ADLS_ACUITY_SCORE: 14
ADLS_ACUITY_SCORE: 12
ADLS_ACUITY_SCORE: 12
ADLS_ACUITY_SCORE: 14
ADLS_ACUITY_SCORE: 12
ADLS_ACUITY_SCORE: 14

## 2019-04-04 ASSESSMENT — PAIN DESCRIPTION - DESCRIPTORS: DESCRIPTORS: ACHING

## 2019-04-04 NOTE — PLAN OF CARE
Alondraisosei   post po spine  S- States pain feels fairly well controlled on IV dilaudid pca- transitioned to po at 1230- 5mg oxy- has reamined same amt of comfort, dozing occas.    Up in chair - tolerated well. orthotics ahas ordered her brace- ok to be in chair without.   Drsg intact, AJAY 80cc drainage.    States occas Rt buttock numbness ahs come and gone- baseline.  Also L top of foot tingling /numbness- states is baseline- has it occas.   Tolerating eggs and mild for meals.  Tolerating reg diet/fluids.  Arrieta cath discontinue at 1430- states hx freq urination and some urgency- ok w using pullups.   Hgb 8.6 after PC infused early am.  BP still 77-90's sysst.  Denies lightheadedness   good UO  SR without ectopy  Sats and IP good, does freq have shallow slow resps w dozing- rate 8-12 sleep, 16 awake.     A- doing well post op   resps sensetive to narcotic.  Low BP- tolerating well  R- enc po fluids.  Enc chair w meals.  Monitor vs, resps    cont IMC   minize narcotic as able    pt states hx fuzzy thinking and eventually suicidal thoughts w gabapentin  DR Ying infomred- ok to hold.

## 2019-04-04 NOTE — PROGRESS NOTES
"    Orthopaedic Surgery Daily Progress Note     Subjective: Soft BPs overnight. 1 unit(s) pRBC given after fluid bolus.   Pain is troublesome overnight but improved this AM controlled. Tolerating diet.   No fever, chills. No chest pain or shortness of breath. No abdominal pain, nausea, vomiting. No diarrhea or constipation. Arrieta in place     Physical Exam   BP (!) 84/38   Pulse 63   Temp 97  F (36.1  C) (Oral)   Resp 10   Ht 1.473 m (4' 10\")   Wt 44.9 kg (98 lb 15.8 oz)   SpO2 98%   BMI 20.69 kg/m      Intake/Output Summary (Last 24 hours) at 4/4/2019 0638  Last data filed at 4/4/2019 0555  Gross per 24 hour   Intake 3177.92 ml   Output 2487 ml   Net 690.92 ml       Drain: 192/10 and 110/0 mL last two shifts- keep today     General: Alert, well-appearing  in no acute distress.  Respiratory: Non-labored breathing.   Cardiovascular: Extremities warm and well perfused   Extremities: moving all four extremities.   Lumbar Spine:                                                   Motor -                           L2-3: Hip flexion 5/5 R and 5/5 L strength                           L3/4:  Knee extension R 5/5 and L 5/5 strength                          L4/5:  Foot dorsiflexion R 5/5 L 5/5 and                                       EHL dorsiflexion R 4/5 L 4/5 strength                          S1:  Plantarflexion/Peroneal Muscles  R 5/5 and L 5/5 strength                          Sensation: intact to light touch L3-S1 distribution BLE    Dressing CDI    Skin: As noted above. No rashes or lesions appreciated.    Labs    Complete Blood Count   Recent Labs   Lab 04/03/19  2113 04/03/19  1440 04/03/19  1350   WBC  --   --  8.1   HGB 7.5* 8.4* 8.7*   PLT  --   --  209     Basic Metabolic Panel  Recent Labs   Lab 04/03/19  1910 04/03/19  1440 04/03/19  1350   * 129* 129*   POTASSIUM 3.3* 3.0* 2.8*   CHLORIDE 87*  --  87*   CO2 34*  --  34*   BUN 11  --  8   CR 0.86  --  0.86   *  --  133*     Coagulation " Profile  No lab results found in last 7 days.    Assessment/Plan:  Assessment and Plan:  Leeanne Duarte is a 68 year old female s/p Lumbar 3-4 Decompression, Lumbar 4-Sacral 1 Transforaminal Lumbar Interbody Fusion, Lumbar 5-Sacral 1 Kaminski Maxwell Osteotomy, Lumbar 4-Pelvis Instrumentation with illiac crest autograft on 4/3 with Dr. Ventura.     Orthopedic Surgery Primary  Activity: Until TLSO is in place, bed to chair only. May stand to don/doff brace. Up with assist until independent. No excessive bending or twisting. No lifting >10 lbs x 6 weeks. No Hilaria lift for transfers.   Weight bearing status: WBAT.   Pain management: Transition from IV to PO as tolerated. No NSAIDs  Antibiotics: Ancef x 24 hours.  Diet: Begin with clear fluids and progress diet as tolerated.   DVT prophylaxis: SCDs only. No chemical DVT ppx needed.  Imaging: XR Upright lumbosacral spine PTDC - ordered.  Labs: Hgb POD#1, 2 or until stable.  Bracing/Splinting: TLSO when out of bed (okay for bed to chair until TLSO in place. Okay to stand to don/doff brace). Anticipate 6 weeks of brace wear.  Dressings: Keep Aquacel c/d/i x 7 days.  Drains: Document output per shift, will be discontinued at Orthopedic Surgery discretion.  Arrieta catheter: Remove POD#1.   Physical Therapy/Occupational Therapy: Eval and treat.  Cultures: none  Consults: Hospitalist.  Follow-up: Clinic with Dr. Ventura in 6 weeks with repeat x-rays.   Disposition: Pending progress with therapies, pain control on orals, and medical stability, anticipate discharge to home vs TCU on POD #3-5.      Luh Waite MD   Orthopaedic Surgery Resident, PGY-4  P: [phone]

## 2019-04-04 NOTE — PROGRESS NOTES
04/04/19 1100   Quick Adds   Type of Visit Initial PT Evaluation   Living Environment   Lives With spouse   Living Arrangements house   Home Accessibility stairs to enter home;stairs within home   Number of Stairs, Main Entrance 1   Stair Railings, Main Entrance none   Number of Stairs, Within Home, Primary 10   Stair Railings, Within Home, Primary railings on both sides of stairs   Transportation Anticipated car, drives self   Self-Care   Equipment Currently Used at Home none   Functional Level Prior   Ambulation 0-->independent   Transferring 0-->independent   Toileting 0-->independent   Bathing 0-->independent   Communication 0-->understands/communicates without difficulty   Swallowing 0-->swallows foods/liquids without difficulty   Cognition 0 - no cognition issues reported   Fall history within last six months yes   Number of times patient has fallen within last six months 1   Which of the above functional risks had a recent onset or change? ambulation   General Information   Onset of Illness/Injury or Date of Surgery - Date 04/03/19   Referring Physician Francisca Uribe   Patient/Family Goals Statement pt an spouse hope to go home   Pertinent History of Current Problem (include personal factors and/or comorbidities that impact the POC) Leeanne Duarte is a 68 year old female s/p Lumbar 3-4 Decompression, Lumbar 4-Sacral 1 Transforaminal Lumbar Interbody Fusion, Lumbar 5-Sacral 1 Kaminski Maxwell Osteotomy, Lumbar 4-Pelvis Instrumentation with illiac crest autograft on 4/3 with Dr. Ventura.    Precautions/Limitations spinal precautions   Weight-Bearing Status - LUE full weight-bearing   Weight-Bearing Status - RUE full weight-bearing   Weight-Bearing Status - LLE weight-bearing as tolerated   Weight-Bearing Status - RLE weight-bearing as tolerated   General Observations spouse present and suppotive   Cognitive Status Examination   Orientation orientation to person, place and time   Level of  "Consciousness alert   Follows Commands and Answers Questions 100% of the time   Personal Safety and Judgment intact   Memory intact   Pain Assessment   Patient Currently in Pain Yes, see Vital Sign flowsheet  (5/10)   Integumentary/Edema   Integumentary/Edema Comments per surgery   Range of Motion (ROM)   ROM Comment trunk ROM limited s/p surgrey and restrictions   Strength   Strength Comments general post op weakness   Bed Mobility   Bed Mobility Comments bed mob with min A for roll and side to sit   Transfer Skills   Transfer Comments sit to stand with min A and FWW, min A to turn to chair   Balance   Balance Comments mild balance decrease in standing static and dynamic requires UE support   General Therapy Interventions   Planned Therapy Interventions bed mobility training;gait training;transfer training;home program guidelines;progressive activity/exercise   Clinical Impression   Criteria for Skilled Therapeutic Intervention yes, treatment indicated   PT Diagnosis difficulty walking   Influenced by the following impairments post op pain, decreased ROM, balance strength and act kash   Functional limitations due to impairments impaired functional mob I an safety   Clinical Presentation Stable/Uncomplicated   Clinical Presentation Rationale clincical judgement   Clinical Decision Making (Complexity) Low complexity   Therapy Frequency` 2 times/day   Predicted Duration of Therapy Intervention (days/wks) 3 days   Anticipated Equipment Needs at Discharge front wheeled walker   Anticipated Discharge Disposition Home with Assist   Risk & Benefits of therapy have been explained Yes   Patient, Family & other staff in agreement with plan of care Yes   [facility] [employer] TM \"6 Clicks\"   2016, Trustees of [facility], under license to [employer].  All rights reserved.   6 Clicks Short Forms Basic Mobility Inpatient Short Form   [facility] AM-PAC  \"6 Clicks\" V.2 Basic Mobility Inpatient Short Form   1. " Turning from your back to your side while in a flat bed without using bedrails? 3 - A Little   2. Moving from lying on your back to sitting on the side of a flat bed without using bedrails? 3 - A Little   3. Moving to and from a bed to a chair (including a wheelchair)? 3 - A Little   4. Standing up from a chair using your arms (e.g., wheelchair, or bedside chair)? 3 - A Little   5. To walk in hospital room? 1 - Total   6. Climbing 3-5 steps with a railing? 1 - Total   Basic Mobility Raw Score (Score out of 24.Lower scores equate to lower levels of function) 14

## 2019-04-04 NOTE — PLAN OF CARE
Discharge Planner PT   Patient plan for discharge: home with spouse  Current status:PT eval completed and treatment initiated, pt s/p spine surgery, awiting TLSO, pt lives with spouse in home with 1 step to enter and 10 to bed and bath, previously I with mob and ADL's.  Pt currently requires min A for bed mob and transfer to chair, amb held until brace arrives.  Woo act well.  Barriers to return to prior living situation: stairs, not yet amb  Recommendations for discharge: home with spouse, and HEP, may need FWW at discharge  Rationale for recommendations: Anticipate pt will reach goals for safety discharge to home with cont therapies.       Entered by: LÓPEZ VILLASEÑOR 04/04/2019 11:58 AM

## 2019-04-04 NOTE — PROGRESS NOTES
Addendum:     Patient bp remains low despite fluid bolus.   fU hgb 7.5  Will give 1 U PRBC.   Monitor BP  Follow-up Hgb.     Jonathan Sahu MD  House Physician  Pager: [phone]    4/4/2019

## 2019-04-04 NOTE — PROGRESS NOTES
S: order received to see patient at [email or web address] for evalulation for a thoracolumbosacral orthosis (TLSO) as ordered by   O/G: support and stabilize spine with maximum comfort  A: patient seen for eval for a TLSO.  Physician has indicated that they would like a custom TLSO. Multiple measurements were taken to have a brace fabricated by [facility].  Patient needs a custom TLSO due to Dr montgomery order.  Patient/staff have been made aware that brace fitting will take place once the brace is received back in our office.  P: patient will be fit with brace when fabrication is complete, likely tomorrow (date).  Contact orthotics if any issues arise.   RANDI [place].

## 2019-04-04 NOTE — PLAN OF CARE
VS:   Temp: 95.6 (oral), 02 stats: 100% (2 L of oxygen oxymask with humidification) RR: 6 BP: 83/63 (500 mL bolus NS given for low BP). Pt. Capno WDL   Output:   Arrieta catheter, No BM this shift, active bowel sounds but no flatus being passed yet. AJAY drain with sanguineous output of: 92 mL. Hemovac output is none.   Activity:   Patient able to help reposition herself in bed.   Skin: WDL   Pain:   Pt. Reports incisional pain due to spinal surgery. PCA dilaudid being given for pain. Used a total of 0.5 mg this shift.   Neuro/CMS:   WDL,hands/feet cold at baseline due to Raynaud's disease   Dressing(s):   Incisional dressing on back, CDI.   Diet:   Regular diet, pt. Has not eaten yet this shift   LDA:   PIV right hand, WDL   Equipment:   PCA pump   Plan:   Pt. Has had a low RR and low BP most of shift, NS bolus given and continue per protocol as well as continue to monitor RR and BP.   Additional Info:

## 2019-04-04 NOTE — PROGRESS NOTES
"CLINICAL NUTRITION SERVICES - ASSESSMENT NOTE     Nutrition Prescription    RECOMMENDATIONS FOR MDs/PROVIDERS TO ORDER:  None today    Malnutrition Status:    Patient does not meet two of the criteria necessary for diagnosing malnutrition    Recommendations already ordered by Registered Dietitian (RD):  None today    Future/Additional Recommendations:  If PO intakes decline, consider sending Gelatein or a snack (pt enjoys 3 hard boiled eggs)     REASON FOR ASSESSMENT  Leeanne Duarte is a/an 68 year old female assessed by the dietitian for Provider Order - Dietitian to Assess and Order per Nutrition Protocol. Provider is responsible for nutrition oversight.    S/p Lumbar 3-4 Decompression, Lumbar 4-Sacral 1 Transforaminal Lumbar Interbody Fusion, Lumbar 5-Sacral 1 Kaminski Maxwell Osteotomy, Lumbar 4-Pelvis, Instrumentation, Illiac Crest Autograft, [place] Bone Marrow From Iliac Crest on 4/3    NUTRITION HISTORY  - Pt reports she was eating well PTA, however notes she was anxious about the surgery and feels his may have negatively impacted her PO intakes days PTA. She avoids red meat and high cholesterol/fat foods d/t hx of high cholesterol and triglycerides. She likes high sugar foods and sweets. She also like dry milk powder and mixes in a 2:1 ratio for more concentrated milk. She also enjoys hard boiled eggs.    CURRENT NUTRITION ORDERS  Diet: Regular  Intake/Tolerance: pt reports she has a good appetite and is enjoying the meals.    LABS  Labs reviewed    MEDICATIONS  Medications reviewed    ANTHROPOMETRICS  Ht Readings from Last 1 Encounters:   04/03/19 1.473 m (4' 10\")   Most Recent Weight: 44.9 kg (98 lb 15.8 oz)  IBW: 43.2 kg (104% IBW)  BMI: Normal BMI  Weight History: wt appears stable x2 months  Wt Readings from Last 10 Encounters:   04/03/19 44.9 kg (98 lb 15.8 oz)   03/12/19 46.4 kg (102 lb 4.8 oz)   02/05/19 43.1 kg (95 lb)   07/27/16 44 kg (97 lb)     Dosing Weight: 45 kg - admit wt    ASSESSED " NUTRITION NEEDS  Estimated Energy Needs: 9239-7858 kcals/day (25 - 30 kcals/kg)  Justification: Maintenance  Estimated Protein Needs: 54-68 grams protein/day (1.2 - 1.5 grams of pro/kg)  Justification: Post-op  Estimated Fluid Needs: 1 mL/kcal  Justification: Per provider pending fluid status    PHYSICAL FINDINGS  See malnutrition section below.    MALNUTRITION  % Intake: No decreased intake noted  % Weight Loss: None noted  Subcutaneous Fat Loss: None observed  Muscle Loss: None observed  Fluid Accumulation/Edema: None noted  Malnutrition Diagnosis: Patient does not meet two of the above criteria necessary for diagnosing malnutrition    NUTRITION DIAGNOSIS  Predicted inadequate protein intake related to variable appetite and increased needs as evidenced by pt reliant on PO intakes to meet 100% of nutritional needs with potential for variation        INTERVENTIONS  Implementation  Nutrition Education: discussed high protein diet post op. Pt able to list several high protein foods she enjoys and feels she is getting enough protein. She denied any questions or concerns at this time.      Goals  Patient to consume % of nutritionally adequate meal trays TID, or the equivalent with supplements/snacks.     Monitoring/Evaluation  Progress toward goals will be monitored and evaluated per protocol.      Elena Arreguin RD, LD  Unit Pager: [phone]

## 2019-04-04 NOTE — PLAN OF CARE
Adult Inpatient Plan of Care  Plan of Care Review  4/4/2019 0610 - No Change by Chacha Haas, RN   Note for the night shift.  D:Gave pt a 500cc bolus over 2 hours at the start of the shift. It did not affect her low BP. AT 0400 gave her a unit of blood and it did not affect the BP, still low.  Pt asymptomatic with her BP. Sats good all night on 1 L NC. When sleeping resp rate running 6-8. Sats very good even with the slow resp rate. Drsg CDI. Neuros and CMS all intact. K+ low on evening but not replaced, so received 60 meq of KCL over 2 hours tonight. Rolls well in bed on her own.  Had very good UOP tonight and 110 out of the AJAY tonight. Able to make needs known. Call light with in reach. A: doing well even though the BP and RR are down. Tolerating well. P: Monitor closely.  Supportive cares. Medicate for pain as needed. Needs brace made for her , before she gets up today, per the spine . Work with IS.

## 2019-04-05 ENCOUNTER — APPOINTMENT (OUTPATIENT)
Dept: OCCUPATIONAL THERAPY | Facility: CLINIC | Age: 69
DRG: 454 | End: 2019-04-05
Attending: STUDENT IN AN ORGANIZED HEALTH CARE EDUCATION/TRAINING PROGRAM
Payer: COMMERCIAL

## 2019-04-05 ENCOUNTER — APPOINTMENT (OUTPATIENT)
Dept: GENERAL RADIOLOGY | Facility: CLINIC | Age: 69
DRG: 454 | End: 2019-04-05
Attending: STUDENT IN AN ORGANIZED HEALTH CARE EDUCATION/TRAINING PROGRAM
Payer: COMMERCIAL

## 2019-04-05 ENCOUNTER — APPOINTMENT (OUTPATIENT)
Dept: PHYSICAL THERAPY | Facility: CLINIC | Age: 69
DRG: 454 | End: 2019-04-05
Attending: ORTHOPAEDIC SURGERY
Payer: COMMERCIAL

## 2019-04-05 LAB
ALBUMIN SERPL-MCNC: 2 G/DL (ref 3.4–5)
ANION GAP SERPL CALCULATED.3IONS-SCNC: 3 MMOL/L (ref 3–14)
BUN SERPL-MCNC: 10 MG/DL (ref 7–30)
CALCIUM SERPL-MCNC: 6.7 MG/DL (ref 8.5–10.1)
CHLORIDE SERPL-SCNC: 108 MMOL/L (ref 94–109)
CO2 SERPL-SCNC: 26 MMOL/L (ref 20–32)
CREAT SERPL-MCNC: 0.6 MG/DL (ref 0.52–1.04)
ERYTHROCYTE [DISTWIDTH] IN BLOOD BY AUTOMATED COUNT: 19.8 % (ref 10–15)
GFR SERPL CREATININE-BSD FRML MDRD: >90 ML/MIN/{1.73_M2}
GLUCOSE SERPL-MCNC: 87 MG/DL (ref 70–99)
HCT VFR BLD AUTO: 25.5 % (ref 35–47)
HGB BLD-MCNC: 8 G/DL (ref 11.7–15.7)
LACTATE BLD-SCNC: 0.6 MMOL/L (ref 0.7–2)
MCH RBC QN AUTO: 24.6 PG (ref 26.5–33)
MCHC RBC AUTO-ENTMCNC: 31.4 G/DL (ref 31.5–36.5)
MCV RBC AUTO: 79 FL (ref 78–100)
PLATELET # BLD AUTO: 136 10E9/L (ref 150–450)
POTASSIUM SERPL-SCNC: 3.5 MMOL/L (ref 3.4–5.3)
RBC # BLD AUTO: 3.25 10E12/L (ref 3.8–5.2)
SODIUM SERPL-SCNC: 137 MMOL/L (ref 133–144)
WBC # BLD AUTO: 5.8 10E9/L (ref 4–11)

## 2019-04-05 PROCEDURE — 36415 COLL VENOUS BLD VENIPUNCTURE: CPT | Performed by: INTERNAL MEDICINE

## 2019-04-05 PROCEDURE — 72100 X-RAY EXAM L-S SPINE 2/3 VWS: CPT

## 2019-04-05 PROCEDURE — 99231 SBSQ HOSP IP/OBS SF/LOW 25: CPT | Performed by: INTERNAL MEDICINE

## 2019-04-05 PROCEDURE — 25000128 H RX IP 250 OP 636: Performed by: STUDENT IN AN ORGANIZED HEALTH CARE EDUCATION/TRAINING PROGRAM

## 2019-04-05 PROCEDURE — 97165 OT EVAL LOW COMPLEX 30 MIN: CPT | Mod: GO | Performed by: OCCUPATIONAL THERAPIST

## 2019-04-05 PROCEDURE — 82040 ASSAY OF SERUM ALBUMIN: CPT | Performed by: INTERNAL MEDICINE

## 2019-04-05 PROCEDURE — 12000001 ZZH R&B MED SURG/OB UMMC

## 2019-04-05 PROCEDURE — 99207 ZZC CDG-MDM COMPONENT: MEETS LOW - DOWN CODED: CPT | Performed by: INTERNAL MEDICINE

## 2019-04-05 PROCEDURE — 83605 ASSAY OF LACTIC ACID: CPT | Performed by: INTERNAL MEDICINE

## 2019-04-05 PROCEDURE — 85027 COMPLETE CBC AUTOMATED: CPT | Performed by: INTERNAL MEDICINE

## 2019-04-05 PROCEDURE — 97530 THERAPEUTIC ACTIVITIES: CPT | Mod: GP | Performed by: PHYSICAL THERAPIST

## 2019-04-05 PROCEDURE — 25000132 ZZH RX MED GY IP 250 OP 250 PS 637: Performed by: STUDENT IN AN ORGANIZED HEALTH CARE EDUCATION/TRAINING PROGRAM

## 2019-04-05 PROCEDURE — 80048 BASIC METABOLIC PNL TOTAL CA: CPT | Performed by: INTERNAL MEDICINE

## 2019-04-05 PROCEDURE — 25800030 ZZH RX IP 258 OP 636: Performed by: INTERNAL MEDICINE

## 2019-04-05 PROCEDURE — 97535 SELF CARE MNGMENT TRAINING: CPT | Mod: GO | Performed by: OCCUPATIONAL THERAPIST

## 2019-04-05 PROCEDURE — 97116 GAIT TRAINING THERAPY: CPT | Mod: GP | Performed by: PHYSICAL THERAPIST

## 2019-04-05 PROCEDURE — 25000128 H RX IP 250 OP 636: Performed by: INTERNAL MEDICINE

## 2019-04-05 RX ORDER — OXYCODONE HYDROCHLORIDE 5 MG/1
5-10 TABLET ORAL
Qty: 50 TABLET | Refills: 0 | Status: SHIPPED | OUTPATIENT
Start: 2019-04-05 | End: 2019-04-18

## 2019-04-05 RX ORDER — DIAZEPAM 5 MG
2.5-5 TABLET ORAL EVERY 6 HOURS PRN
Qty: 15 TABLET | Refills: 0 | Status: SHIPPED | OUTPATIENT
Start: 2019-04-05 | End: 2022-02-23

## 2019-04-05 RX ADMIN — ACETAMINOPHEN 975 MG: 325 TABLET, FILM COATED ORAL at 06:05

## 2019-04-05 RX ADMIN — OXYCODONE HYDROCHLORIDE 5 MG: 5 TABLET ORAL at 21:22

## 2019-04-05 RX ADMIN — OXYCODONE HYDROCHLORIDE AND ACETAMINOPHEN 500 MG: 500 TABLET ORAL at 08:56

## 2019-04-05 RX ADMIN — SODIUM CHLORIDE: 9 INJECTION, SOLUTION INTRAVENOUS at 17:00

## 2019-04-05 RX ADMIN — CALCIUM CARBONATE (ANTACID) CHEW TAB 500 MG 500 MG: 500 CHEW TAB at 00:56

## 2019-04-05 RX ADMIN — CEFAZOLIN 1 G: 1 INJECTION, POWDER, FOR SOLUTION INTRAMUSCULAR; INTRAVENOUS at 16:31

## 2019-04-05 RX ADMIN — SENNOSIDES AND DOCUSATE SODIUM 2 TABLET: 8.6; 5 TABLET ORAL at 21:22

## 2019-04-05 RX ADMIN — HYDROXYZINE HYDROCHLORIDE 25 MG: 25 TABLET ORAL at 13:34

## 2019-04-05 RX ADMIN — CEFAZOLIN 1 G: 1 INJECTION, POWDER, FOR SOLUTION INTRAMUSCULAR; INTRAVENOUS at 08:56

## 2019-04-05 RX ADMIN — POLYETHYLENE GLYCOL 3350 17 G: 17 POWDER, FOR SOLUTION ORAL at 08:56

## 2019-04-05 RX ADMIN — ACETAMINOPHEN 975 MG: 325 TABLET, FILM COATED ORAL at 21:23

## 2019-04-05 RX ADMIN — ESCITALOPRAM OXALATE 10 MG: 10 TABLET ORAL at 21:23

## 2019-04-05 RX ADMIN — OXYCODONE HYDROCHLORIDE 5 MG: 5 TABLET ORAL at 06:05

## 2019-04-05 RX ADMIN — OXYCODONE HYDROCHLORIDE 5 MG: 5 TABLET ORAL at 17:00

## 2019-04-05 RX ADMIN — RANITIDINE 150 MG: 150 TABLET ORAL at 08:57

## 2019-04-05 RX ADMIN — CEFAZOLIN 1 G: 1 INJECTION, POWDER, FOR SOLUTION INTRAMUSCULAR; INTRAVENOUS at 00:58

## 2019-04-05 RX ADMIN — RANITIDINE 150 MG: 150 TABLET ORAL at 21:22

## 2019-04-05 RX ADMIN — ACETAMINOPHEN 975 MG: 325 TABLET, FILM COATED ORAL at 13:34

## 2019-04-05 RX ADMIN — OXYCODONE HYDROCHLORIDE 10 MG: 5 TABLET ORAL at 00:56

## 2019-04-05 RX ADMIN — SENNOSIDES AND DOCUSATE SODIUM 2 TABLET: 8.6; 5 TABLET ORAL at 08:56

## 2019-04-05 RX ADMIN — SODIUM CHLORIDE 1000 ML: 9 INJECTION, SOLUTION INTRAVENOUS at 09:45

## 2019-04-05 ASSESSMENT — ACTIVITIES OF DAILY LIVING (ADL)
ADLS_ACUITY_SCORE: 14

## 2019-04-05 ASSESSMENT — PAIN DESCRIPTION - DESCRIPTORS
DESCRIPTORS: ACHING;BURNING
DESCRIPTORS: ACHING;BURNING

## 2019-04-05 NOTE — PROGRESS NOTES
"    Orthopaedic Surgery Daily Progress Note     Subjective: Soft BPs continue. Pain is improved.  Tolerating diet.   No fever, chills. No chest pain or shortness of breath. No abdominal pain, nausea, vomiting. No diarrhea or constipation. Arrieta removed, urinating without difficulty. \"Hip pain\" improved with oxycodone.     Physical Exam   BP 90/44 (BP Location: Left arm)   Pulse 62   Temp 97.2  F (36.2  C) (Oral)   Resp 16   Ht 1.473 m (4' 10\")   Wt 44.9 kg (98 lb 15.8 oz)   SpO2 95%   BMI 20.69 kg/m      Intake/Output Summary (Last 24 hours) at 4/4/2019 0638  Last data filed at 4/4/2019 0555  Gross per 24 hour   Intake 3177.92 ml   Output 2487 ml   Net 690.92 ml       Drain: 85/40 and 10/10 last two shifts     General: Alert, well-appearing  in no acute distress.  Respiratory: Non-labored breathing.   Cardiovascular: Extremities warm and well perfused   Extremities: moving all four extremities.   Lumbar Spine:                                                   Motor -                           L2-3: Hip flexion 5/5 R and 5/5 L strength                           L3/4:  Knee extension R 5/5 and L 5/5 strength                          L4/5:  Foot dorsiflexion R 5/5 L 5/5 and                                       EHL dorsiflexion R 4/5 L 4/5 strength                          S1:  Plantarflexion/Peroneal Muscles  R 5/5 and L 5/5 strength                          Sensation: intact to light touch L3-S1 distribution BLE    Dressing CDI    Skin: As noted above. No rashes or lesions appreciated.    Labs    Complete Blood Count   Recent Labs   Lab 04/04/19  1859 04/04/19  0859 04/03/19  2113 04/03/19  1440 04/03/19  1350   WBC  --  7.6  --   --  8.1   HGB 8.2* 8.6* 7.5* 8.4* 8.7*   PLT  --  173  --   --  209     Basic Metabolic Panel  Recent Labs   Lab 04/04/19  0859 04/03/19  1910 04/03/19  1440 04/03/19  1350   * 129* 129* 129*   POTASSIUM 4.3 3.3* 3.0* 2.8*   CHLORIDE 100 87*  --  87*   CO2 24 34*  --  34*   BUN " 9 11  --  8   CR 0.70 0.86  --  0.86   GLC 89 128*  --  133*     Coagulation Profile  No lab results found in last 7 days.    Assessment/Plan:  Assessment and Plan:  Leeanne Duarte is a 68 year old female s/p Lumbar 3-4 Decompression, Lumbar 4-Sacral 1 Transforaminal Lumbar Interbody Fusion, Lumbar 5-Sacral 1 Kaminski Maxwell Osteotomy, Lumbar 4-Pelvis Instrumentation with illiac crest autograft on 4/3 with Dr. Ventura.     Orthopedic Surgery Primary  Activity: Until TLSO is in place, bed to chair only. May stand to don/doff brace. Up with assist until independent. No excessive bending or twisting. No lifting >10 lbs x 6 weeks. No Hilaria lift for transfers.   Weight bearing status: WBAT.   Pain management: Transition from IV to PO as tolerated. No NSAIDs  Antibiotics: Ancef x 24 hours completed.   Diet: Begin with clear fluids and progress diet as tolerated.   DVT prophylaxis: SCDs only. No chemical DVT ppx needed.  Imaging: XR Upright lumbosacral spine PTDC - ordered.  Labs: Hgb POD#1, 2 or until stable.  Bracing/Splinting: TLSO when out of bed (okay for bed to chair until TLSO in place. Okay to stand to don/doff brace). Anticipate 6 weeks of brace wear.  Dressings: Keep Aquacel c/d/i x 7 days.  Drains: Document output per shift, will be discontinued at Orthopedic Surgery discretion.  Arrieta catheter: Removed   Physical Therapy/Occupational Therapy: Eval and treat.  Cultures: none  Consults: Hospitalist.  Follow-up: Clinic with Dr. Ventura in 6 weeks with repeat x-rays.   Disposition: Pending progress with therapies, pain control on orals, and medical stability, anticipate discharge to home vs TCU on POD #3-5.      Luh Waite MD   Orthopaedic Surgery Resident, PGY-4  P: [phone]

## 2019-04-05 NOTE — PLAN OF CARE
VS:    Temp: 97.2  F (36.2  C) Temp src: Oral BP: 90/44 Pulse: 62 Heart Rate: 72 Resp: 16 SpO2: 95 % O2 Device: Nasal cannula Oxygen Delivery: 2 LPM  BP remains soft - Team aware - Vitals otherwise stable. De-sating on room air - 2 LPM NC administered.   Lung sounds are clear.    Output:    Voiding spontaneously without difficulty. Bowels sounds active - passing flatus. No BM overnight.    Activity:    Ambulated to washroom with assist of 1 with walker and gait belt.   Skin: Incision - otherwise intact.    Pain:    Pain well managed with PRN oxycodone and scheduled tylenol.    Neuro/CMS:    Baseline tingling in left foot - CMS/neuros are otherwise intact. Alert and oriented X4.    Dressing(s):    Aquacel to spine is clean/dry/intact.    Diet:    Regular.   Equipment:    Walker.  Gait belt.  PCD's.    IV's/Drains:    PIV infusing normal saline at 125/hr  AJAY drain with 40 out overnight.  Hemovac with 10 out overnight.    Plan:    To be determined.    Additional Info:    Able to make needs known. Will continue with plan of care.

## 2019-04-05 NOTE — PLAN OF CARE
Patient is on IMC Status:    Patient A & O x 4. Neuros and CMS intact except patient has some numbness in the left foot since surgery. Patients hands are cold and finger tips turn purple due to her Reynauds. VS - BP 75/42 and afebrile, SpO2 92% on 2 LPM NC (see flowsheet), MD aware. 1,000 ml NS bolus given as ordered. BP increased to 108/48, MD aware. LS clear, BS + x 4, patient passing flatus. Patient urinating good amounts of clear ariana urine and was last bladder scanned for 188. Patient states pain is tolerable. Pain meds given as ordered and PRN, ice packs used for comfort. Surgical site dressing is C/D/I. Hemovac drain patent with 10 ml of bloody drainage this shift. AJAY drain patent with 20 ml of bloody drainage. Patient is on continuous telemetry monitoring and has been in NSR this shift. Right hand PIV saline locked, Right arm PIV infusing. Patient on a regular diet and tolerating it well. BG was 87 at 0931. Patient up ad renee with assist of 1 with TLSO brace on and walker in room. Patient is having a lot of anxiety and had a panic attack about wearing the TLSO brace. PO Atarax 25 mg given as ordered and patient felt better. Patient reassured that she will get use to the brace and that it will be ok. PT/OT aware of patients anxiety about the brace. Hgb 8.0, K+ 3.5 this AM, MD aware. Patient able to make needs known. Call light within reach. Will continue with POC.

## 2019-04-05 NOTE — PROGRESS NOTES
"Patient seen and examined with RN     S- continues with soft BP but denies any  Chest pain/ SOB/ cough, Denies any dizziness    4 point ROS done and neg unless mentioned     O-   /48 (BP Location: Left arm)   Pulse 62   Temp 95.6  F (35.3  C) (Oral)   Resp 16   Ht 1.473 m (4' 10\")   Wt 44.9 kg (98 lb 15.8 oz)   SpO2 97%   BMI 20.69 kg/m      Gen- pleasant  laying on bed  Neck- supple  CVS- I+II+ no m/r/g  RS- CTABS  Abdo- soft, no tenderness . No g/r/r  Ext- no edema   MSK- as per ortho     LABS:   BMP  Recent Labs   Lab 04/05/19  0931 04/04/19  0859 04/03/19  1910 04/03/19  1440 04/03/19  1350    131* 129* 129* 129*   POTASSIUM 3.5 4.3 3.3* 3.0* 2.8*   CHLORIDE 108 100 87*  --  87*   LIVE 6.7* 6.6* 7.3*  --  7.5*   CO2 26 24 34*  --  34*   BUN 10 9 11  --  8   CR 0.60 0.70 0.86  --  0.86   GLC 87 89 128*  --  133*     CBC  Recent Labs   Lab 04/05/19  0931  04/04/19  0859  04/03/19  1350   WBC 5.8  --  7.6  --  8.1   RBC 3.25*  --  3.48*  --  3.76*   HGB 8.0*   < > 8.6*   < > 8.7*   HCT 25.5*  --  26.7*  --  27.5*   MCV 79  --  77*  --  73*   MCH 24.6*  --  24.7*  --  23.1*   MCHC 31.4*  --  32.2  --  31.6   RDW 19.8*  --  19.7*  --  18.0*   *  --  173  --  209    < > = values in this interval not displayed.     A/P:  # s/p Lumbar 3-4 Decompression, Lumbar 4-Sacral 1 Transforaminal Lumbar Interbody Fusion, Lumbar 5-Sacral 1 Kaminski Maxwell Osteotomy, Lumbar 4-Pelvis Instrumentation, Illiac Crest Autograft  [place] Bone Marrow From Iliac Crest- 4/3.   - soft BP : asymptomatic, stable renal function and normal LA. s/p 1 U PRBC.4/4 (EBL-600cc) . Monitor Hb. Bolus NS  -monitor   # Hypo K- replaced as per protocol .   # Hyponatremia- better    Rogelio Ying MD (Pager- [phone])   Internal Medicine/ Hospitalist    "

## 2019-04-05 NOTE — PLAN OF CARE
Discharge Planner OT   Patient plan for discharge: Home but is open to TCU if needed  Current status: Patient limited by significant panic attack due to TLSO; difficulty breathing, crying, looking as if she would faint- patient reported she suffers from panic attacks at times.  Patient CGA for ambulation to/from bathroom with walker, Max A for toilet hygiene and clothing management due to anxiety.  Patient immediately feeling better once brace removed when standing EOB.  Barriers to return to prior living situation: None anticipated  Recommendations for discharge: Home with assist  Rationale for recommendations:  Continued daily OT for maximum independence in ADLs/mobility       Entered by: Eneida Dutta 04/05/2019 2:09 PM

## 2019-04-05 NOTE — PLAN OF CARE
OT:  Patient very pleasant but wanting to wait to get up until later this morning after spouse arrives; will attempt later as brace may be present for increased activity.

## 2019-04-05 NOTE — PLAN OF CARE
"    VS:   BP 98/50 (BP Location: Left arm)   Pulse 62   Temp 97.7  F (36.5  C) (Oral)   Resp 14   Ht 1.473 m (4' 10\")   Wt 44.9 kg (98 lb 15.8 oz)   SpO2 96%   BMI 20.69 kg/m    BP soft most of the shift- asymptomatic and MD aware. 1000mL bolus given.    Output:   Arrieta removed during day shift. Voided x2 without difficulty. PVRs WNL. Hypoactive BS, passing flatus.    Activity:   Up SBA, using walker and gait belt. Ambulates to bathroom and once in the pozo.    Skin: Intact except incision   Pain:   Tolerable with discomfort. Taking 5mg Oxycodone PRN.    Neuro/CMS:   Baseline tingling on top of L foot. Baseline numbness in R. buttocks intermittently. Cool skin- hx of raynaud's.    Dressing(s):   Aquacel and drain x2 dressings all C/D/I    Diet:   Regular-tolerating well.    Equipment:   Walker, IV pole, PCDs on bilaterally    IV's/Drains:   PIV upper R forearm SL, PIV R hand infusing   AJAY output 85mL   Hemovac output 10mL   Plan:   Continue pain control. Continue BP monitoring. Able to make needs known, call light in reach.    Additional Info:          "

## 2019-04-05 NOTE — PLAN OF CARE
Discharge Planner PT   Patient plan for discharge: home with spouse  Current status: TLSO on for gait. Walked hallway distances with ww and contact guard from PT and pt's spouse handling IV pole.  Mild dizziness at care home point but able to walk to nurses desk and back.  Toileted with min A.  Tolerated brace without incident late PM.   present for entire session and helping throughout.    Barriers to return to prior living situation: limited activity tolerance. Needs to climb 10 stairs at home.   Recommendations for discharge: home with spouse  Rationale for recommendations: PT to continue to work on gait, stairs, car transfers and TLSO don and doff.        Entered by: Mary Andrew 04/05/2019 4:44 PM

## 2019-04-05 NOTE — PROGRESS NOTES
"S: patient seen today for fitting/delivery of custom thoracolumbosacral orthosis (TLSO)  O/G: support and stabilize the spine with maximum comfort  A: patient seen for fitting of TLSO.  Brace was donned to patient to assess fit.  At this time brace is fitting patient appropriately and patient has no complaints about the fit.  Donning, doffing, use and care were explained to patient/nursing staff. Patient fit with a custom padded bivalve TLSO as manufactured by [employer].   P: contact orthotics if any issues arise    Fitting a Colman TLSO  1. Patient should be supine.  Palpate for waist (below ribs but above hips) so you know where to line up waist grooves of the brace.   2. Logroll patient and slide back section of brace under patient lining up waist groove of brace with patient's waist.   3. Roll patient onto their back with the posterior section behind them.  Recheck alignment of waist groove on brace with patient's waist.  It may be necessary to logroll patient to the opposite side to get posterior section centered on patient, where it extends anteriorly equal amounts on patient's sides.  Repeat logrolling side to side as necessary.   4.  Once posterior section positioned correctly, lay anterior section on patient.  Again, line up waist groove of brace to patient's waist.  Waist grooves of anterior and posterior section should match up and fit together. Anterior shell should go over the top of posterior shell.  Velcro straps should line up with the chafes/D-rings, if they don't, either the anterior or the posterior sections are not in the proper place.    5. Fasten the middle straps first and tighten both sides evenly.  Then fasten either top or bottom straps in the same manner.  Brace should be snug so as to prevent brace from sliding up on patient.   If it is too loose, the brace will slide up and cause discomfort to the patient in the chin and/or axilla area.  6.  When properly fit, brace should be about 1\" " "inferior to the manubrium and about 1.5-2\" inferior to the axilla.  The inferior aspect should allow clearance so as not to cut into the tops of the thighs when sitting, but needs to be as low in the pelvic area as possible.     For optimal fit brace should NOT be donned with patient sitting.  Ideal fit is achieved by donning in either laying or standing position.  Due to changes in belly tissue, it is difficult to achieve a proper fit when patient is sitting.     Brace migration is inevitable, especially when patient is going from laying to upright in bed.  Bed will push posterior section of brace up and will push the whole brace up.  Migration will also occur when patient is sitting in a hard chair.  Don't be afraid to readjust brace and pull it down and back to its proper position.    Yony barragan CO,Lo.   "

## 2019-04-05 NOTE — PLAN OF CARE
AM PT appointment deferred as pt does not have TLSO yet and only transfers bed<>chair until brace arrived.

## 2019-04-06 ENCOUNTER — APPOINTMENT (OUTPATIENT)
Dept: OCCUPATIONAL THERAPY | Facility: CLINIC | Age: 69
DRG: 454 | End: 2019-04-06
Attending: ORTHOPAEDIC SURGERY
Payer: COMMERCIAL

## 2019-04-06 ENCOUNTER — APPOINTMENT (OUTPATIENT)
Dept: PHYSICAL THERAPY | Facility: CLINIC | Age: 69
DRG: 454 | End: 2019-04-06
Attending: ORTHOPAEDIC SURGERY
Payer: COMMERCIAL

## 2019-04-06 PROCEDURE — 97116 GAIT TRAINING THERAPY: CPT | Mod: GP

## 2019-04-06 PROCEDURE — 25000128 H RX IP 250 OP 636: Performed by: STUDENT IN AN ORGANIZED HEALTH CARE EDUCATION/TRAINING PROGRAM

## 2019-04-06 PROCEDURE — 97530 THERAPEUTIC ACTIVITIES: CPT | Mod: GP

## 2019-04-06 PROCEDURE — 12000001 ZZH R&B MED SURG/OB UMMC

## 2019-04-06 PROCEDURE — 25000132 ZZH RX MED GY IP 250 OP 250 PS 637: Performed by: STUDENT IN AN ORGANIZED HEALTH CARE EDUCATION/TRAINING PROGRAM

## 2019-04-06 PROCEDURE — 97530 THERAPEUTIC ACTIVITIES: CPT | Mod: GO

## 2019-04-06 PROCEDURE — 99231 SBSQ HOSP IP/OBS SF/LOW 25: CPT | Performed by: INTERNAL MEDICINE

## 2019-04-06 PROCEDURE — 25800030 ZZH RX IP 258 OP 636: Performed by: INTERNAL MEDICINE

## 2019-04-06 PROCEDURE — 25000128 H RX IP 250 OP 636: Performed by: ORTHOPAEDIC SURGERY

## 2019-04-06 RX ORDER — CEFAZOLIN SODIUM 1 G/3ML
1 INJECTION, POWDER, FOR SOLUTION INTRAMUSCULAR; INTRAVENOUS EVERY 8 HOURS
Status: COMPLETED | OUTPATIENT
Start: 2019-04-06 | End: 2019-04-07

## 2019-04-06 RX ADMIN — CEFAZOLIN 1 G: 1 INJECTION, POWDER, FOR SOLUTION INTRAMUSCULAR; INTRAVENOUS at 14:12

## 2019-04-06 RX ADMIN — CEFAZOLIN 1 G: 1 INJECTION, POWDER, FOR SOLUTION INTRAMUSCULAR; INTRAVENOUS at 22:01

## 2019-04-06 RX ADMIN — SODIUM CHLORIDE: 9 INJECTION, SOLUTION INTRAVENOUS at 11:01

## 2019-04-06 RX ADMIN — CEFAZOLIN 1 G: 1 INJECTION, POWDER, FOR SOLUTION INTRAMUSCULAR; INTRAVENOUS at 00:29

## 2019-04-06 RX ADMIN — ACETAMINOPHEN 975 MG: 325 TABLET, FILM COATED ORAL at 05:48

## 2019-04-06 RX ADMIN — SENNOSIDES AND DOCUSATE SODIUM 2 TABLET: 8.6; 5 TABLET ORAL at 20:29

## 2019-04-06 RX ADMIN — SENNOSIDES AND DOCUSATE SODIUM 2 TABLET: 8.6; 5 TABLET ORAL at 08:29

## 2019-04-06 RX ADMIN — OXYCODONE HYDROCHLORIDE AND ACETAMINOPHEN 500 MG: 500 TABLET ORAL at 08:30

## 2019-04-06 RX ADMIN — ACETAMINOPHEN 650 MG: 325 TABLET, FILM COATED ORAL at 22:07

## 2019-04-06 RX ADMIN — CEFAZOLIN 1 G: 1 INJECTION, POWDER, FOR SOLUTION INTRAMUSCULAR; INTRAVENOUS at 08:29

## 2019-04-06 RX ADMIN — SODIUM CHLORIDE: 9 INJECTION, SOLUTION INTRAVENOUS at 01:35

## 2019-04-06 RX ADMIN — ESCITALOPRAM OXALATE 10 MG: 10 TABLET ORAL at 22:01

## 2019-04-06 RX ADMIN — RANITIDINE 150 MG: 150 TABLET ORAL at 20:29

## 2019-04-06 RX ADMIN — SODIUM CHLORIDE: 9 INJECTION, SOLUTION INTRAVENOUS at 20:29

## 2019-04-06 RX ADMIN — ACETAMINOPHEN 650 MG: 325 TABLET, FILM COATED ORAL at 14:08

## 2019-04-06 RX ADMIN — RANITIDINE 150 MG: 150 TABLET ORAL at 08:30

## 2019-04-06 ASSESSMENT — ACTIVITIES OF DAILY LIVING (ADL)
ADLS_ACUITY_SCORE: 14

## 2019-04-06 NOTE — PROGRESS NOTES
"    Orthopaedic Surgery Daily Progress Note     Subjective: Soft BPs continue. Pain is improved.  Tolerating diet.   No fever, chills. No chest pain or shortness of breath. No abdominal pain, nausea, vomiting. No diarrhea or constipation. Has received TLSO. Has been up with therapy.     Physical Exam   BP 96/43 (BP Location: Left arm)   Pulse 76   Temp 97.3  F (36.3  C) (Oral)   Resp 16   Ht 1.473 m (4' 10\")   Wt 44.9 kg (98 lb 15.8 oz)   SpO2 94%   BMI 20.69 kg/m      Intake/Output Summary (Last 24 hours) at 4/4/2019 0638  Last data filed at 4/4/2019 0555  Gross per 24 hour   Intake 3177.92 ml   Output 2487 ml   Net 690.92 ml       Drain: 50/0 and 5/5 last two shifts     General: Alert, well-appearing  in no acute distress.  Respiratory: Non-labored breathing.   Cardiovascular: Extremities warm and well perfused   Extremities: moving all four extremities.   Lumbar Spine:                                                   Motor -                           L2-3: Hip flexion 5/5 R and 5/5 L strength                           L3/4:  Knee extension R 5/5 and L 5/5 strength                          L4/5:  Foot dorsiflexion R 5/5 L 5/5 and                                       EHL dorsiflexion R 4/5 L 4/5 strength                          S1:  Plantarflexion/Peroneal Muscles  R 5/5 and L 5/5 strength                          Sensation: intact to light touch L3-S1 distribution BLE    Dressing CDI    Skin: As noted above. No rashes or lesions appreciated.    Labs    Complete Blood Count   Recent Labs   Lab 04/05/19  0931 04/04/19  1859 04/04/19  0859 04/03/19  2113  04/03/19  1350   WBC 5.8  --  7.6  --   --  8.1   HGB 8.0* 8.2* 8.6* 7.5*   < > 8.7*   *  --  173  --   --  209    < > = values in this interval not displayed.     Basic Metabolic Panel  Recent Labs   Lab 04/05/19  0931 04/04/19  0859 04/03/19  1910 04/03/19  1440 04/03/19  1350    131* 129* 129* 129*   POTASSIUM 3.5 4.3 3.3* 3.0* 2.8* "   CHLORIDE 108 100 87*  --  87*   CO2 26 24 34*  --  34*   BUN 10 9 11  --  8   CR 0.60 0.70 0.86  --  0.86   GLC 87 89 128*  --  133*     Coagulation Profile  No lab results found in last 7 days.    Assessment/Plan:  Assessment and Plan:  Leeanne Duarte is a 68 year old female s/p Lumbar 3-4 Decompression, Lumbar 4-Sacral 1 Transforaminal Lumbar Interbody Fusion, Lumbar 5-Sacral 1 Kaminski Maxwell Osteotomy, Lumbar 4-Pelvis Instrumentation with illiac crest autograft on 4/3 with Dr. Ventura.     Orthopedic Surgery Primary  Activity: OK to mobilize without TLSO. Up with assist until independent. No excessive bending or twisting. No lifting >10 lbs x 6 weeks. No Hilaria lift for transfers.   Weight bearing status: WBAT.   Pain management: Transition from IV to PO as tolerated. No NSAIDs  Antibiotics: Ancef x 24 hours completed.   Diet: Begin with clear fluids and progress diet as tolerated.   DVT prophylaxis: SCDs only. No chemical DVT ppx needed.  Imaging: XR Upright lumbosacral spine PTDC - ordered.  Labs: Hgb POD#1, 2 or until stable.  Bracing/Splinting: TLSO when out of bed (okay for bed to chair until TLSO in place. Okay to stand to don/doff brace). Anticipate 6 weeks of brace wear.  Dressings: Keep Aquacel c/d/i x 7 days.  Drains: Document output per shift, will be discontinued at Orthopedic Surgery discretion.  Arrieta catheter: Removed   Physical Therapy/Occupational Therapy: Eval and treat.  Cultures: none  Consults: Hospitalist.  Follow-up: Clinic with Dr. Ventura in 6 weeks with repeat x-rays.   Disposition: Pending progress with therapies, pain control on orals, and medical stability, anticipate discharge to home vs TCU on POD #3-5.      Luh Waite MD   Orthopaedic Surgery Resident, PGY-4  P: [phone]

## 2019-04-06 NOTE — PLAN OF CARE
"      VS:   /44 (BP Location: Left arm)   Pulse 71   Temp 98.2  F (36.8  C) (Oral)   Resp 16   Ht 1.473 m (4' 10\")   Wt 44.9 kg (98 lb 15.8 oz)   SpO2 92%   BMI 20.69 kg/m     LS clear.Tolerating at room air with 02 satts 90-94%.   Output:   Voided 300ml,scanned for 39ml.Loose stool x1 this shift.   Activity:   Up SBA1 and walker.TLSO brace donned prior to ambulating to BR.   Skin: Intact except back incision.   Pain:   Reports minimal pain on back.Has scheduled tylenol,given.Ice  refreshed.   Neuro/CMS:   Denies numbness/tingling sensation so far.   Dressing(s):   Aquacel drsg,CDI.   Diet:   Regular diet.   LDA:   PIV lines R hand,saline locked.R forearm PIV line with IVF at 125ml/hr.   Equipment:   Tele remains NSR.TLSO brace when up with activity.IV pole and walker.PCDs on.   Plan:   Wants to discharge home with  to help with cares.   Additional Info:         "

## 2019-04-06 NOTE — PLAN OF CARE
Discharge Planner PT   Patient plan for discharge: home with assist PRN from   Current status: pt demos mod I bed mobility using bed rail, adheres to spinal precautions. Per ortho note 4/6, no longer needs to use TLSO when OOB. Pt demos indep sit<>stand transfers, SBA - indep gait 250 feet x2. Completes 12 stairs with 2 railings mod I.     OK to ambulate with  in pozo using FWW.    Barriers to return to prior living situation: medical stability  Recommendations for discharge: home with assist, may benefit from OP PT for strength, activity tolerance  Rationale for recommendations: see above       Entered by: Eleanor Devries 04/06/2019 11:08 AM

## 2019-04-06 NOTE — PLAN OF CARE
"      VS:   /48 (BP Location: Left arm)   Pulse 76   Temp 95.6  F (35.3  C) (Oral)   Resp 17   Ht 1.473 m (4' 10\")   Wt 44.9 kg (98 lb 15.8 oz)   SpO2 98%   BMI 20.69 kg/m    Room air. Denies chest pain and SOB   Output:   Voiding without difficulty in bathroom. LBM 4/6 (soft/loose). Bowels sounds active and audible   Activity:   Ax1 with walker and gait belt.   Ortho okay with pt not having TLSO brace on while up due to the anxiety it causes the patient.    Skin: Intact ex for incision.    Pain:   Tolerating well with PRN Tylenol Q4H.    Neuro/CMS:   Intact, denies numbness/tingling. A&Ox4   Dressing(s):   Aquacel and drain sites on back.    Diet:   Regular diet, tolerating well.    LDA:   PIV R forearm SL, PIV R lower forearm infusing.    Equipment:   Walker, PCDS   Plan:   Continue to monitor. Pt able to make needs known, call light with reach. Pt would like to go home with  when ready for discharge   Additional Info:   Off tele and IMC       "

## 2019-04-06 NOTE — PLAN OF CARE
Discharge Planner OT   Patient plan for discharge: Home with assist from   Current status: Patient completes all bed mobility mod I using bed rails. Completes sit<> stand with SBA. Patient demonstrates good dynamic standing tolerance at sink to complete g/h task for ~ 5 minutes adhering to precautions throughout. Patient completes toileting and les-cares with SBA. Patient left supine in bed with all needs met.   Barriers to return to prior living situation: spinal precautions, medical  Recommendations for discharge: Home with assist  Rationale for recommendations: To maximize safety and independence for functional mobility and self-cares       Entered by: Martin Quintana 04/06/2019 1:39 PM

## 2019-04-06 NOTE — PLAN OF CARE
"Vitals: /57 (BP Location: Left arm)   Pulse 76   Temp 97.3  F (36.3  C) (Oral)   Resp 16   Ht 1.473 m (4' 10\")   Wt 44.9 kg (98 lb 15.8 oz)   SpO2 99%   BMI 20.69 kg/m    A&O x 4.  Lung sounds clear. Denies CP, SOB.   Output: Voiding without difficulty  Last BM: 4/6   Activity: SBA with walker.   Skin: Intact ex. Incision/drains.  R hand PIV removed d/t swelling.   Pain: Back pain - managed with PRN tylenol x1   CMS/Neuro: Intact.   Dressing: C/D/I   Diet: Regular. No abd discomfort or N/V.   LDA: R AC PIV - infusing   Equipment:    Plan/Add'l info: Able to make needs known. Call light within reach. Continue with POC.         "

## 2019-04-06 NOTE — PROGRESS NOTES
"Patient seen and examined with RN     S- feels better compared to yesterday. no Chest pain/ SOB/ cough, Denies any dizziness    4 point ROS done and neg unless mentioned     O-   /48 (BP Location: Left arm)   Pulse 76   Temp 95.6  F (35.3  C) (Oral)   Resp 17   Ht 1.473 m (4' 10\")   Wt 44.9 kg (98 lb 15.8 oz)   SpO2 98%   BMI 20.69 kg/m     Gen- pleasant  laying on bed  Neck- supple  CVS- I+II+ no m/r/g  RS- CTABS  Abdo- soft, no tenderness . No g/r/r  Ext- no edema   MSK- as per ortho     LABS:   BMP  Recent Labs   Lab 04/05/19  0931 04/04/19  0859 04/03/19  1910 04/03/19  1440 04/03/19  1350    131* 129* 129* 129*   POTASSIUM 3.5 4.3 3.3* 3.0* 2.8*   CHLORIDE 108 100 87*  --  87*   LIVE 6.7* 6.6* 7.3*  --  7.5*   CO2 26 24 34*  --  34*   BUN 10 9 11  --  8   CR 0.60 0.70 0.86  --  0.86   GLC 87 89 128*  --  133*     CBC  Recent Labs   Lab 04/05/19  0931  04/04/19  0859  04/03/19  1350   WBC 5.8  --  7.6  --  8.1   RBC 3.25*  --  3.48*  --  3.76*   HGB 8.0*   < > 8.6*   < > 8.7*   HCT 25.5*  --  26.7*  --  27.5*   MCV 79  --  77*  --  73*   MCH 24.6*  --  24.7*  --  23.1*   MCHC 31.4*  --  32.2  --  31.6   RDW 19.8*  --  19.7*  --  18.0*   *  --  173  --  209    < > = values in this interval not displayed.     A/P:  # s/p Lumbar 3-4 Decompression, Lumbar 4-Sacral 1 Transforaminal Lumbar Interbody Fusion, Lumbar 5-Sacral 1 Kaminski Maxwell Osteotomy, Lumbar 4-Pelvis Instrumentation, Illiac Crest Autograft  [place] Bone Marrow From Iliac Crest- 4/3.   - soft BP : better now. s/p 1 U PRBC.4/4 (EBL-600cc) . Monitor Hb.   -monitor   # Hypo K- replaced as per protocol .   # Hyponatremia- better    Rogelio Ying MD (Pager- [phone])   Internal Medicine/ Hospitalist    "

## 2019-04-07 ENCOUNTER — APPOINTMENT (OUTPATIENT)
Dept: OCCUPATIONAL THERAPY | Facility: CLINIC | Age: 69
DRG: 454 | End: 2019-04-07
Attending: ORTHOPAEDIC SURGERY
Payer: COMMERCIAL

## 2019-04-07 ENCOUNTER — APPOINTMENT (OUTPATIENT)
Dept: PHYSICAL THERAPY | Facility: CLINIC | Age: 69
DRG: 454 | End: 2019-04-07
Attending: ORTHOPAEDIC SURGERY
Payer: COMMERCIAL

## 2019-04-07 PROCEDURE — 99231 SBSQ HOSP IP/OBS SF/LOW 25: CPT | Performed by: INTERNAL MEDICINE

## 2019-04-07 PROCEDURE — 25000128 H RX IP 250 OP 636: Performed by: ORTHOPAEDIC SURGERY

## 2019-04-07 PROCEDURE — 25000132 ZZH RX MED GY IP 250 OP 250 PS 637: Performed by: STUDENT IN AN ORGANIZED HEALTH CARE EDUCATION/TRAINING PROGRAM

## 2019-04-07 PROCEDURE — 97530 THERAPEUTIC ACTIVITIES: CPT | Mod: GO

## 2019-04-07 PROCEDURE — 97535 SELF CARE MNGMENT TRAINING: CPT | Mod: GO

## 2019-04-07 PROCEDURE — 12000001 ZZH R&B MED SURG/OB UMMC

## 2019-04-07 PROCEDURE — 97116 GAIT TRAINING THERAPY: CPT | Mod: GP

## 2019-04-07 PROCEDURE — 25800030 ZZH RX IP 258 OP 636: Performed by: INTERNAL MEDICINE

## 2019-04-07 PROCEDURE — 97530 THERAPEUTIC ACTIVITIES: CPT | Mod: GP

## 2019-04-07 RX ORDER — AMOXICILLIN 250 MG
2 CAPSULE ORAL 2 TIMES DAILY
Qty: 50 TABLET | Refills: 0 | Status: SHIPPED | OUTPATIENT
Start: 2019-04-07 | End: 2022-02-23

## 2019-04-07 RX ORDER — ACETAMINOPHEN 325 MG/1
650 TABLET ORAL EVERY 4 HOURS PRN
Qty: 200 TABLET | Refills: 1 | Status: SHIPPED | OUTPATIENT
Start: 2019-04-07 | End: 2022-03-11

## 2019-04-07 RX ADMIN — ACETAMINOPHEN 650 MG: 325 TABLET, FILM COATED ORAL at 11:27

## 2019-04-07 RX ADMIN — RANITIDINE 150 MG: 150 TABLET ORAL at 08:35

## 2019-04-07 RX ADMIN — ESCITALOPRAM OXALATE 10 MG: 10 TABLET ORAL at 20:25

## 2019-04-07 RX ADMIN — SENNOSIDES AND DOCUSATE SODIUM 2 TABLET: 8.6; 5 TABLET ORAL at 20:25

## 2019-04-07 RX ADMIN — OXYCODONE HYDROCHLORIDE AND ACETAMINOPHEN 500 MG: 500 TABLET ORAL at 08:35

## 2019-04-07 RX ADMIN — HYDROXYZINE HYDROCHLORIDE 25 MG: 25 TABLET ORAL at 20:28

## 2019-04-07 RX ADMIN — ACETAMINOPHEN 650 MG: 325 TABLET, FILM COATED ORAL at 23:49

## 2019-04-07 RX ADMIN — RANITIDINE 150 MG: 150 TABLET ORAL at 20:24

## 2019-04-07 RX ADMIN — CEFAZOLIN 1 G: 1 INJECTION, POWDER, FOR SOLUTION INTRAMUSCULAR; INTRAVENOUS at 05:27

## 2019-04-07 RX ADMIN — SODIUM CHLORIDE: 9 INJECTION, SOLUTION INTRAVENOUS at 05:28

## 2019-04-07 RX ADMIN — ACETAMINOPHEN 650 MG: 325 TABLET, FILM COATED ORAL at 05:27

## 2019-04-07 ASSESSMENT — ACTIVITIES OF DAILY LIVING (ADL)
ADLS_ACUITY_SCORE: 14

## 2019-04-07 NOTE — PROGRESS NOTES
"    Orthopaedic Surgery Daily Progress Note     Subjective: Soft BPs continue. Pain is improved.  Tolerating diet.   No fever, chills. No chest pain or shortness of breath. No abdominal pain, nausea, vomiting. No diarrhea or constipation. Did better with therapy without TLSO yesterday.     Physical Exam   /63 (BP Location: Left arm)   Pulse 76   Temp 99.2  F (37.3  C) (Oral)   Resp 17   Ht 1.473 m (4' 10\")   Wt 44.9 kg (98 lb 15.8 oz)   SpO2 97%   BMI 20.69 kg/m      Intake/Output Summary (Last 24 hours) at 4/4/2019 0638  Last data filed at 4/4/2019 0555  Gross per 24 hour   Intake 3177.92 ml   Output 2487 ml   Net 690.92 ml       Drain:  last two shifts 25/30 ml - removed today.     General: Alert, well-appearing  in no acute distress.  Respiratory: Non-labored breathing.   Cardiovascular: Extremities warm and well perfused   Extremities: moving all four extremities.   Lumbar Spine:                                                   Motor -                           L2-3: Hip flexion 5/5 R and 5/5 L strength                           L3/4:  Knee extension R 5/5 and L 5/5 strength                          L4/5:  Foot dorsiflexion R 5/5 L 5/5 and                                       EHL dorsiflexion R 4/5 L 4/5 strength                          S1:  Plantarflexion/Peroneal Muscles  R 5/5 and L 5/5 strength                          Sensation: intact to light touch L3-S1 distribution BLE    Dressing CDI    Skin: As noted above. No rashes or lesions appreciated.    Labs    Complete Blood Count   Recent Labs   Lab 04/05/19  0931 04/04/19  1859 04/04/19  0859 04/03/19  2113  04/03/19  1350   WBC 5.8  --  7.6  --   --  8.1   HGB 8.0* 8.2* 8.6* 7.5*   < > 8.7*   *  --  173  --   --  209    < > = values in this interval not displayed.     Basic Metabolic Panel  Recent Labs   Lab 04/05/19  0931 04/04/19  0859 04/03/19  1910 04/03/19  1440 04/03/19  1350    131* 129* 129* 129*   POTASSIUM 3.5 4.3 3.3* " 3.0* 2.8*   CHLORIDE 108 100 87*  --  87*   CO2 26 24 34*  --  34*   BUN 10 9 11  --  8   CR 0.60 0.70 0.86  --  0.86   GLC 87 89 128*  --  133*     Coagulation Profile  No lab results found in last 7 days.     Xrays completed: reviewed. L4-pelvis fixation in place. In good position.     Assessment/Plan:  Assessment and Plan:  Leeanne Duarte is a 68 year old female s/p Lumbar 3-4 Decompression, Lumbar 4-Sacral 1 Transforaminal Lumbar Interbody Fusion, Lumbar 5-Sacral 1 Kaminski Maxwell Osteotomy, Lumbar 4-Pelvis Instrumentation with illiac crest autograft on 4/3 with Dr. Ventura.     Orthopedic Surgery Primary  Activity: OK to mobilize without TLSO. Up with assist until independent. No excessive bending or twisting. No lifting >10 lbs x 6 weeks. No Hilaria lift for transfers.   Weight bearing status: WBAT.   Pain management: Transition from IV to PO as tolerated. No NSAIDs  Antibiotics: Ancef x 24 hours completed.   Diet: Begin with clear fluids and progress diet as tolerated.   DVT prophylaxis: SCDs only. No chemical DVT ppx needed.  Imaging: XR Upright lumbosacral spine PTDC - ordered.  Labs: Hgb POD#1, 2 or until stable.  Bracing/Splinting: TLSO when out of bed (okay for bed to chair until TLSO in place. Okay to stand to don/doff brace). Anticipate 6 weeks of brace wear.  Dressings: Keep Aquacel c/d/i x 7 days.  Drains: Removed   Arrieta catheter: Removed   Physical Therapy/Occupational Therapy: Eval and treat.  Cultures: none  Consults: Hospitalist.  Follow-up: Clinic with Dr. Ventura in 6 weeks with repeat x-rays.   Disposition: Anticipate discharge tomorrow       Luh Waite MD   Orthopaedic Surgery Resident, PGY-4  P: [phone]

## 2019-04-07 NOTE — PLAN OF CARE
"  VS:   /63 (BP Location: Left arm)   Pulse 76   Temp 99.2  F (37.3  C) (Oral)   Resp 17   Ht 1.473 m (4' 10\")   Wt 44.9 kg (98 lb 15.8 oz)   SpO2 97%   BMI 20.69 kg/m     LS clear,satting well at room air.   Output:   Voiding spontaneously.Is passing gas,last BM yesterday.  AJAY output=35ml,HV with no output.   Activity:   Up SBA1 and walker.   Skin: Back incision covered with aquacel drsg.   Pain:   Back pain minimal,did  request for tylenol,given.   Neuro/CMS:   Intact.   Dressing(s):   Aquacel drsg to lower back,CDI.   Diet:   Regular.IVF running at 125ml/hr.   LDA:   PIV line R AC for IV fluid,last dose of IV abx given.   Equipment:   PCDS.IV pole,walker.   Plan:   Plan discharge home with 's assistance.   Additional Info:         "

## 2019-04-07 NOTE — PLAN OF CARE
Discharge Planner OT   Patient plan for discharge: Home with assist  Current status: Patient educated on two LB dressing techniques with/without AE . Patient completes LB dressing  mod I with reacher and sock aid. Patient completes bed mobility and functional transfers IND.  Barriers to return to prior living situation: None anticipated.  Recommendations for discharge: Home with Assist  Rationale for recommendations: Patient has demonstrated much progress and demonstrates increased activity tolerance to complete self-cares, and functional mobility. Significant other will be at home to assist PRN.       Entered by: Martin Quintana 04/07/2019 11:22 AM     Occupational Therapy Discharge Summary    Reason for therapy discharge:    All goals and outcomes met, no further needs identified.    Progress towards therapy goal(s). See goals on Care Plan in Epic electronic health record for goal details.  Goals met    Therapy recommendation(s):    No further therapy is recommended.

## 2019-04-07 NOTE — PLAN OF CARE
"VS:    /59 (BP Location: Left arm)   Pulse 76   Temp 97.4  F (36.3  C) (Oral)   Resp 17   Ht 1.473 m (4' 10\")   Wt 44.9 kg (98 lb 15.8 oz)   SpO2 97%   BMI 20.69 kg/m    Room air. Denies chest pain and SOB   Output:    Voiding without difficulty in bathroom. LBM 4/7 (soft/loose--held stool softeners)  Drains removed this morning.    Activity:    Independent steady on feet.    Skin: Intact ex for incision.    Pain:    Tolerating well with PRN Tylenol Q4H.    Neuro/CMS:    Intact, denies numbness/tingling. A&Ox4   Dressing(s):    Aquacel   Diet:    Regular diet, tolerating well.    LDA:     PIV R lower forearm SL   Equipment:    Walker, PCDS   Plan:    Continue to monitor. Pt able to make needs known, call light with reach.     Discharge tomorrow 4/8   Additional Info:             "

## 2019-04-07 NOTE — DISCHARGE SUMMARY
ORTHOPEDIC SURGERY DISCHARGE SUMMARY    Leeanne Duarte [id number] 4/3/2019  5:46 AM  68 year old female  4/7/2019    Date of Admission: 4/3/2019  Date of Discharge: 4/8/2019  2:34 PM  Disposition: home   Primary care physician: Renée Horne  Admitting Physician:  Lane Ventura MD  Discharge Physician:  Francisca Uribe MD    ADMISSION DIAGNOSIS:  Status post lumbar spine surgery for decompression of spinal cord [Z98.890]     DISCHARGE DIAGNOSIS:  Status post lumbar spine surgery for decompression of spinal cord [Z98.890]  Acute blood loss anemia requiring blood transfusion     SURGERY:  Procedure(s):  Lumbar 3-4 Decompression, Lumbar 4-Sacral 1 Transforaminal Lumbar Interbody Fusion, Lumbar 5-Sacral 1 Kaminski Maxwell Osteotomy, Lumbar 4-Pelvis Instrumentation, Illiac Crest Autograft  [place] Bone Marrow From Iliac Crest  Implant Name Type Inv. Item Serial No.  Lot No. LRB No. Used   IMP SCR SET MEDT SOLERA BREAK OFF 5.5MM TI [id number] Metallic Hardware/[employer] IMP SCR SET MEDT SOLERA BREAK OFF 5.5MM TI [id number]  [employer] [id number] N/A 8   IMP SCR MEDT 5.5/6.0MM SOLERA 7.5X55MM MA [id number] Metallic Hardware/[employer] IMP SCR MEDT 5.5/6.0MM SOLERA 7.5X55MM MA [id number]  [employer] [id number] N/A 3   IMP SCR MEDT 5.5/6.0MM SOLERA 7.5X50MM MA [id number] Metallic Hardware/[employer] IMP SCR MEDT 5.5/6.0MM SOLERA 7.5X50MM MA [id number]  [employer] [id number] N/A 1   IMP SCR MEDT 5.5/6.0MM SOLERA 7.5X40MM MA [id number] Metallic Hardware/[employer] IMP SCR MEDT 5.5/6.0MM SOLERA 7.5X40MM MA [id number]  [employer] [id number] N/A 2   9.5 x 90 Screw    [employer] [id number] N/A 2   GRAFT BONE CRUSH CANC 30ML [id number] Bone/Tissue/Biologic GRAFT BONE CRUSH CANC 30ML [id number] MUSCULOSKELETAL KRUEGER 0910 N/A 1   IMP SPACER MEDT CAPSTONE CONTROL [id number] 18DEG [id number] Metallic Hardware/[employer] IMP SPACER MEDT CAPSTONE CONTROL [id number] 18DEG [id number]  [employer] [id number] N/A 1   IMP  SPACER MEDT CAPSTONE CONTROL [id number] 18DEG [id number] Metallic Hardware/[employer] IMP SPACER MEDT CAPSTONE CONTROL [id number] 18DEG [id number]  [employer] [id number] N/A 1   IMP SPACER MEDT CAPSTONE CONTROL [id number] 18DEG [id number] Metallic Hardware/[employer] IMP SPACER MEDT CAPSTONE CONTROL [id number] 18DEG [id number]  [employer] [id number] N/A 1   IMP LAUREL MEDT SOLERA CVD 5.5X80MM CHR [id number] Metallic Hardware/[employer] IMP LAUREL MEDT SOLERA CVD 5.5X80MM CHR [id number]  [employer] [id number] N/A 1   IMP LAUREL MEDT SOLERA CVD 5.5X90MM CHR [id number] Metallic Hardware/[employer] IMP LAUREL MEDT SOLERA CVD 5.5X90MM CHR [id number]  [employer] [id number] N/A 1       HOSPITAL COURSE:  Surgery was uncomplicated. The patient has done well post-operatively, though course was complicated by acute blood loss anemia secondary to intraoperative loss. Estimated blood loss for the procedure was 600 mL and she was transfused for 1 unit on 4/4/19. Medicine was consult was requested for co-management. she has received routine nursing cares and is medically stable. Vital signs are stable. PT/OT goals for safe mobility. Pain is controlled on oral medications and will be given stool softeners to use while taking pain medications to help prevent constipation.     ORTHOPEDIC EXAM:  Vitals:    B/P: 125/59, T: 97.4, P: 76, R: 17  Physical Exam:  Please see the progress note from the day of discharge.   Pertinent exam findings followed during admission:     LABS:  Recent Labs   Lab 04/05/19  0931 04/04/19  1859 04/04/19  0859 04/03/19  2113  04/03/19  1350   WBC 5.8  --  7.6  --   --  8.1   HGB 8.0* 8.2* 8.6* 7.5*   < > 8.7*   *  --  173  --   --  209    < > = values in this interval not displayed.     Recent Labs   Lab 04/05/19  0931 04/04/19  0859 04/03/19  1910 04/03/19  1440 04/03/19  1350    131* 129* 129* 129*   POTASSIUM 3.5 4.3 3.3* 3.0* 2.8*   CHLORIDE 108 100 87*  --  87*   CO2 26 24 34*  --  34*   BUN 10 9 11  --  8   CR 0.60 0.70 0.86  --  0.86    GLC 87 89 128*  --  133*   MAG  --   --  1.8  --   --      No lab results found in last 7 days.    ALLERGIES:     Allergies   Allergen Reactions     Chocolate      Orange      Gabapentin      Confusion         PLANNED DISCHARGE ORDERS, RECOMMENDATIONS, AND FOLLOWUP:  Current Discharge Medication List      START taking these medications    Details   acetaminophen (TYLENOL) 325 MG tablet Take 2 tablets (650 mg) by mouth every 4 hours as needed for other (For optimal non-opioid multimodal pain management to improve pain control and physical function.)  Qty: 200 tablet, Refills: 1    Associated Diagnoses: Status post lumbar spine surgery for decompression of spinal cord      diazepam (VALIUM) 5 MG tablet Take 0.5-1 tablets (2.5-5 mg) by mouth every 6 hours as needed for muscle spasms  Qty: 15 tablet, Refills: 0    Associated Diagnoses: Status post lumbar spine surgery for decompression of spinal cord      oxyCODONE (ROXICODONE) 5 MG tablet Take 1-2 tablets (5-10 mg) by mouth every 3 hours as needed for breakthrough pain  Qty: 50 tablet, Refills: 0    Associated Diagnoses: Status post lumbar spine surgery for decompression of spinal cord      senna-docusate (SENOKOT-S/PERICOLACE) 8.6-50 MG tablet Take 2 tablets by mouth 2 times daily  Qty: 50 tablet, Refills: 0    Associated Diagnoses: Status post lumbar spine surgery for decompression of spinal cord         CONTINUE these medications which have NOT CHANGED    Details   Ascorbic Acid (VITAMIN C) 500 MG CAPS Take  by mouth daily.      Calcium Citrate-Vitamin D (CALCIUM CITRATE + D) 300-100 MG-UNIT TABS Take by mouth 3 times daily Take 2 tablets in the AM and 2 tablets in the PM. Vit D 400 IU, Calcium 500 mg      escitalopram (LEXAPRO) 10 MG tablet take 1 tablet every morning  Refills: 3      LORAZEPAM PO Take 0.5 mg by mouth 2 times daily as needed for anxiety      FLUAD 0.5 ML TALI ADM 0.5ML IM UTD  Refills: 0         STOP taking these medications       IBUPROFEN PO  Comments:   Reason for Stopping:             Discharge Procedure Orders   Reason for your hospital stay   Order Comments: Spine surgery     Adult [facility] Follow-up and recommended labs and tests   Order Comments: As scheduled with Dr. Ventura     Appointments on [place] and/or [facility] [place] (with [facility] or [facility] provider or service). Call [phone] if you haven't heard regarding these appointments within 7 days of discharge.     Activity   Order Comments: Your activity upon discharge: activity as tolerated- no bending, lifting > 10 lbs, twisting.   TLSO not needed     Order Specific Question Answer Comments   Is discharge order? Yes      Discharge Instructions   Order Comments: Wound Care: Your incision was closed with sutures underneath the skin. If you have a brown/tan rubber-like dressing (called Aquacel) applied to your surgical site, you may shower over this and pat dry afterward. You may remove the dressing 1 week after surgery. You may replace this with gauze and tape. If you would like to keep covered, change the bandages every other day and keep wound clean and dry. Showering is allowed if your incision is dry. No scrubbing or submerging your incision in standing water such as a bath x 4 weeks. You may also note strands of suture from each end of your incision - this is normal and is a knotless type of suture that can be trimmed at its base around 2 weeks from your surgery, otherwise it may be left to fall off on its own.     -Pain control: Take medication as prescribed, but only as often as necessary. Do not drive or drink alcohol while you are taking narcotic pain medication. Remember that Tylenol can be used for pain relief as well, as you wean off the narcotics. Do not exceed 4,000 mg of tylenol in 24 hours.     -Please ice and elevate your affected area as much as possible to reduce swelling. Swelling is one of the most common causes of pain after surgery.    -CALL OUR CLINIC ([phone] during  regular office hours, or [phone] for the on-call resident MD for questions - RESIDENT DOES NOT HAVE ABILITY TO PRESCRIBE NARCOTICS) IF: Pain in your surgical area persists or worsens in the first few days after surgery. Excessive redness or drainage of cloudy or bloody material from the wounds (clear red tinted fluid and some mild drainage should be expected). Drainage of any kind > one week after surgery should be reported to the doctor. You have a temperature elevation greater than 101.5  You have pain, swelling or redness in your calf. You have numbness or weakness in your leg or foot.    -Call your primary doctor or seek medical care if you have any of the following: temperature greater than 101.4, chest pain, increased shortness of breath, nausea or vomiting.     Full Code     Order Specific Question Answer Comments   Code status determined by: Unable to determine; FULL CODE until documents or legal decision maker available      Diet   Order Comments: Follow this diet upon discharge: Orders Placed This Encounter      Advance Diet as Tolerated: Regular Diet Adult     Order Specific Question Answer Comments   Is discharge order? Yes      Assign Questionnaire Series to Patient           Francisca Uribe MD   PGY-4 Orthopaedic Surgery   [phone]

## 2019-04-07 NOTE — PROGRESS NOTES
"Patient seen and examined with RN     S- continues to feel better. no Chest pain/ SOB/ cough, Denies any dizziness    4 point ROS done and neg unless mentioned     O-   /59 (BP Location: Left arm)   Pulse 76   Temp 97.4  F (36.3  C) (Oral)   Resp 17   Ht 1.473 m (4' 10\")   Wt 44.9 kg (98 lb 15.8 oz)   SpO2 97%   BMI 20.69 kg/m     Gen- pleasant  laying on bed  Neck- supple  CVS- I+II+ no m/r/g  RS- CTABS  Abdo- soft, no tenderness . No g/r/r  Ext- no edema   MSK- as per ortho     LABS:   BMP  Recent Labs   Lab 04/05/19  0931 04/04/19  0859 04/03/19  1910 04/03/19  1440 04/03/19  1350    131* 129* 129* 129*   POTASSIUM 3.5 4.3 3.3* 3.0* 2.8*   CHLORIDE 108 100 87*  --  87*   LIVE 6.7* 6.6* 7.3*  --  7.5*   CO2 26 24 34*  --  34*   BUN 10 9 11  --  8   CR 0.60 0.70 0.86  --  0.86   GLC 87 89 128*  --  133*     CBC  Recent Labs   Lab 04/05/19  0931  04/04/19  0859  04/03/19  1350   WBC 5.8  --  7.6  --  8.1   RBC 3.25*  --  3.48*  --  3.76*   HGB 8.0*   < > 8.6*   < > 8.7*   HCT 25.5*  --  26.7*  --  27.5*   MCV 79  --  77*  --  73*   MCH 24.6*  --  24.7*  --  23.1*   MCHC 31.4*  --  32.2  --  31.6   RDW 19.8*  --  19.7*  --  18.0*   *  --  173  --  209    < > = values in this interval not displayed.     A/P:  # s/p Lumbar 3-4 Decompression, Lumbar 4-Sacral 1 Transforaminal Lumbar Interbody Fusion, Lumbar 5-Sacral 1 Kaminski Maxwell Osteotomy, Lumbar 4-Pelvis Instrumentation, Illiac Crest Autograft  [place] Bone Marrow From Iliac Crest- 4/3.   - soft BP : resolved. s/p 1 U PRBC.4/4 (EBL-600cc) . Monitor Hb.   -monitor   # Hypo K- replaced as per protocol .   # Hyponatremia- better    Rogelio Ying MD (Pager- [phone])   Internal Medicine/ Hospitalist    "

## 2019-04-07 NOTE — PLAN OF CARE
Discharge Planner PT   Patient plan for discharge: home with assist from  PRN  Current status: indep bed mobility, transfers, ambulation 300 feet and 18 stairs. Demos indep toilet transfer from low surface. Reinforced spinal precautions. PT goals met  Barriers to return to prior living situation: medical stability  Recommendations for discharge: home with assist from  PRN, OP PT to progress strength and activity tolerance  Rationale for recommendations: see above       Entered by: Eleanor Devries 04/07/2019 12:42 PM        Physical Therapy Discharge Summary    Reason for therapy discharge:    All goals and outcomes met, no further needs identified.    Progress towards therapy goal(s). See goals on Care Plan in [facility] electronic health record for goal details.  Goals met    Therapy recommendation(s):    Continued therapy is recommended.  Rationale/Recommendations:  OP PT, see above.

## 2019-04-08 ENCOUNTER — PATIENT OUTREACH (OUTPATIENT)
Dept: CARE COORDINATION | Facility: CLINIC | Age: 69
End: 2019-04-08

## 2019-04-08 ENCOUNTER — RECORDS - HEALTHEAST (OUTPATIENT)
Dept: ADMINISTRATIVE | Facility: OTHER | Age: 69
End: 2019-04-08

## 2019-04-08 VITALS
SYSTOLIC BLOOD PRESSURE: 142 MMHG | DIASTOLIC BLOOD PRESSURE: 68 MMHG | RESPIRATION RATE: 18 BRPM | HEART RATE: 76 BPM | HEIGHT: 58 IN | WEIGHT: 98.99 LBS | BODY MASS INDEX: 20.78 KG/M2 | TEMPERATURE: 97.7 F | OXYGEN SATURATION: 98 %

## 2019-04-08 LAB
MAGNESIUM SERPL-MCNC: 2.1 MG/DL (ref 1.6–2.3)
POTASSIUM SERPL-SCNC: 3.3 MMOL/L (ref 3.4–5.3)

## 2019-04-08 PROCEDURE — 84132 ASSAY OF SERUM POTASSIUM: CPT | Performed by: INTERNAL MEDICINE

## 2019-04-08 PROCEDURE — 99231 SBSQ HOSP IP/OBS SF/LOW 25: CPT | Performed by: INTERNAL MEDICINE

## 2019-04-08 PROCEDURE — 25000132 ZZH RX MED GY IP 250 OP 250 PS 637: Performed by: INTERNAL MEDICINE

## 2019-04-08 PROCEDURE — 36415 COLL VENOUS BLD VENIPUNCTURE: CPT | Performed by: INTERNAL MEDICINE

## 2019-04-08 PROCEDURE — 25000132 ZZH RX MED GY IP 250 OP 250 PS 637: Performed by: STUDENT IN AN ORGANIZED HEALTH CARE EDUCATION/TRAINING PROGRAM

## 2019-04-08 PROCEDURE — 83735 ASSAY OF MAGNESIUM: CPT | Performed by: INTERNAL MEDICINE

## 2019-04-08 RX ADMIN — POTASSIUM CHLORIDE 20 MEQ: 750 TABLET, EXTENDED RELEASE ORAL at 13:48

## 2019-04-08 RX ADMIN — ACETAMINOPHEN 650 MG: 325 TABLET, FILM COATED ORAL at 13:48

## 2019-04-08 RX ADMIN — POTASSIUM CHLORIDE 40 MEQ: 750 TABLET, EXTENDED RELEASE ORAL at 11:54

## 2019-04-08 RX ADMIN — ACETAMINOPHEN 650 MG: 325 TABLET, FILM COATED ORAL at 09:29

## 2019-04-08 RX ADMIN — OXYCODONE HYDROCHLORIDE AND ACETAMINOPHEN 500 MG: 500 TABLET ORAL at 09:29

## 2019-04-08 RX ADMIN — RANITIDINE 150 MG: 150 TABLET ORAL at 09:28

## 2019-04-08 ASSESSMENT — ACTIVITIES OF DAILY LIVING (ADL)
ADLS_ACUITY_SCORE: 14

## 2019-04-08 ASSESSMENT — PAIN DESCRIPTION - DESCRIPTORS
DESCRIPTORS: ACHING
DESCRIPTORS: ACHING

## 2019-04-08 NOTE — PLAN OF CARE
"Addendum                 ?Hide copied text    ?[facility] for details      VS:    Blood pressure 123/46, pulse 76, temperature 97.7  F (36.5  C), temperature source Oral, resp. rate 18, height 1.473 m (4' 10\"), weight 44.9 kg (98 lb 15.8 oz), SpO2 98 %.pt on RA   Output:    Voiding without difficulty in bathroom. LBM 4/7    Activity:    Up indep in room    Skin: Intact ex for incision.    Pain:    Managed with tylenol   Neuro/CMS:    Intact, denies numbness/tingling. A&Ox4   Dressing(s):    Aquacel dsg CDI   Diet:    Regular diet, tolerating well.    LDA:     PIV R lower forearm SL   Equipment:    Walker, BARTOLOS   Plan:    Plan for discharge tomorrow 4/8   Additional Info:                     "

## 2019-04-08 NOTE — PLAN OF CARE
Alert and oriented X 4. Able to make needs known. Call light in reach. VSS. Back pain managed with Tylenol. Dressing CDI. CMS/Neuros WNLs. Up ad renee, tolerating activity. Voiding without difficulty. Passing flatus. PIV patent, saline locked. Good PO intake. Denies nausea, headache, dizziness,chest pain or SOB. Appears to be sleeping during rounds. Continue with plan of care. Anticipate discharge today.

## 2019-04-08 NOTE — PROGRESS NOTES
"Patient seen and examined with RN     S- continues to feel better. no Chest pain/ SOB/ cough, Denies any dizziness    4 point ROS done and neg unless mentioned     O-   /68 (BP Location: Left arm)   Pulse 76   Temp 97.7  F (36.5  C) (Oral)   Resp 18   Ht 1.473 m (4' 10\")   Wt 44.9 kg (98 lb 15.8 oz)   SpO2 98%   BMI 20.69 kg/m     Gen- pleasant  laying on bed  Neck- supple  CVS- I+II+ no m/r/g  RS- CTABS  Abdo- soft, no tenderness . No g/r/r  Ext- no edema   MSK- as per ortho     LABS:   BMP  Recent Labs   Lab 04/05/19  0931 04/04/19  0859 04/03/19  1910 04/03/19  1440 04/03/19  1350    131* 129* 129* 129*   POTASSIUM 3.5 4.3 3.3* 3.0* 2.8*   CHLORIDE 108 100 87*  --  87*   LIVE 6.7* 6.6* 7.3*  --  7.5*   CO2 26 24 34*  --  34*   BUN 10 9 11  --  8   CR 0.60 0.70 0.86  --  0.86   GLC 87 89 128*  --  133*     CBC  Recent Labs   Lab 04/05/19  0931  04/04/19  0859  04/03/19  1350   WBC 5.8  --  7.6  --  8.1   RBC 3.25*  --  3.48*  --  3.76*   HGB 8.0*   < > 8.6*   < > 8.7*   HCT 25.5*  --  26.7*  --  27.5*   MCV 79  --  77*  --  73*   MCH 24.6*  --  24.7*  --  23.1*   MCHC 31.4*  --  32.2  --  31.6   RDW 19.8*  --  19.7*  --  18.0*   *  --  173  --  209    < > = values in this interval not displayed.     A/P:  # s/p Lumbar 3-4 Decompression, Lumbar 4-Sacral 1 Transforaminal Lumbar Interbody Fusion, Lumbar 5-Sacral 1 Kaminski Maxwell Osteotomy, Lumbar 4-Pelvis Instrumentation, Illiac Crest Autograft  [place] Bone Marrow From Iliac Crest- 4/3.   - soft BP : resolved. s/p 1 U PRBC.4/4 (EBL-600cc) . Monitor Hb.   # Hypo K- replaced as per protocol .   # Hyponatremia- better    Medically stable for discharge once cleared by PT/Ortho    Rogelio Ying MD (Pager- [phone])   Internal Medicine/ Hospitalist    "

## 2019-04-08 NOTE — PLAN OF CARE
NUrisng   postop spine  S- Pt states thigh discomfort improved w activyt and tylenol  states minimal back aching  up to bathroom per self kash well.   Taking fair po fluids and food.  Low K- replaced  discussed eating hihger K+ foods.  Pt willing   States ready for discharge to home.  Reviewed dischrge instructions w pt and  several questions answered  teachback.    Icsions clean and intact  A- ready for home  R- discharge to home per car and wheel chair w mends and instructions.  Pt did not want her valium- has not been using  also did not want the TSLO brace- did not take it home   suitcase and belongsins and computer home w pt.

## 2019-04-09 NOTE — PROGRESS NOTES
The patient was contacted for a post-hospital call and reports new bilateral leg swelling. Patient denies redness, discoloration, and states they are not warm to the touch - just swollen. Patient is unsure what she should do and if it is ok to elevate them due to her recent surgery. Patient is not currently taking a diuretic and has never had this issue in the past.     Please contact patient to discuss further.

## 2019-04-09 NOTE — PROGRESS NOTES
[facility]: Post-Discharge Note  SITUATION                                                      Admission:    Admission Date: 04/03/19   Reason for Admission: Status post lumbar spine surgery for decompression of spinal cord Z98.890  Discharge:   Discharge Date: 04/08/19  Discharge Diagnosis: Status post lumbar spine surgery for decompression of spinal cord Z98.890  Discharge Service: Orthopedics     BACKGROUND                                                      Leeanne Duarte is a 68 year old female who elected surgical treatment, and understood the indications for this surgery, as well as its risks, benefits, and alternatives as documented in the pre-operative H&P.  Specifically, we reviewed the risks and benefits of the surgery in detail. The risks include, but are not limited to, the general risks associated with anesthesia, including death, pulmonary embolism, DVT, stroke, myocardial infarction, pneumonia, and urinary tract infection. Additional risks specific to the surgery include the risk of infection, failure to heal (non-union), dural tear with resultant CSF leak which might necessitate placement of a drain or revision surgery or could result in headaches, nerve injury resulting in weakness or paralysis, risk of adjacent segment disease, the risks of vascular injury, need for revision surgery in the future due to one of the above issues, or risk of incomplete symptom relief. Leeanne Duarte understands the risks of the surgery and wishes to proceed. No guarantees were given.    ASSESSMENT      Discharge Assessment  Patient reports symptoms are: New(Patient reports bilateral leg swelling that is new since leaving the hospital - message sent to the ortho team)  Does the patient have all of their medications?: Yes  Does patient know what their new medications are for?: Yes  Does patient have a follow-up appointment scheduled?: Yes  Does patient have any other questions or  concerns?: Yes(see above)    Post-op  Did the patient have surgery or a procedure: Yes  Incision: closed;healing  Drainage: No  Bleeding: none  Fever: No  Chills: No  Redness: No  Warmth: No  Swelling: Yes(bilateral legs )  Incision site pain: No  Eating & Drinking: eating and drinking without complaints/concerns  PO Intake: regular diet  Bowel Function: normal  Urinary Status: voiding without complaint/concerns    PLAN                                                      Outpatient Plan: As scheduled with Dr. Ventura     Future Appointments   Date Time Provider Department [place]   5/7/2019 12:20 PM Lane Ventura MD [facility]   5/10/2019  9:00 AM Lane Ventura MD [facility]           Leandra Chambers, [facility]

## 2019-04-09 NOTE — PROGRESS NOTES
Patient contacted.  She will speak with her Primary care provider today regarding her leg swelling.

## 2019-04-10 ENCOUNTER — TELEPHONE (OUTPATIENT)
Dept: ORTHOPEDICS | Facility: CLINIC | Age: 69
End: 2019-04-10

## 2019-04-10 ENCOUNTER — COMMUNICATION - HEALTHEAST (OUTPATIENT)
Dept: SCHEDULING | Facility: CLINIC | Age: 69
End: 2019-04-10

## 2019-04-10 NOTE — TELEPHONE ENCOUNTER
[facility]    Phone Message    May a detailed message be left on voicemail: yes    Reason for Call: Other: Pt of Dr. Quintana, pt's  Ole is calling asking for Dr. Ventura to call in a potasium free diuretic to pt's pharmacy for leg and stomach swelling.  Pt's  thinks she got that way from being in the hospital and too many fluids.  Pt's  would like a call when this has been called into the pts pharmacy at [facility] on [address] in [place].  If you have any questions about this please call Ole at [phone]      Action Taken: Message routed to:  [facility] ([facility]): Ortho

## 2019-04-12 ENCOUNTER — OFFICE VISIT - HEALTHEAST (OUTPATIENT)
Dept: FAMILY MEDICINE | Facility: CLINIC | Age: 69
End: 2019-04-12

## 2019-04-12 DIAGNOSIS — D64.9 ANEMIA, UNSPECIFIED TYPE: ICD-10-CM

## 2019-04-12 DIAGNOSIS — Z98.890 STATUS POST LUMBAR SPINE OPERATIVE PROCEDURE FOR DECOMPRESSION OF SPINAL CORD: ICD-10-CM

## 2019-04-12 DIAGNOSIS — M79.89 SWELLING OF LIMB: ICD-10-CM

## 2019-04-12 LAB
ANION GAP SERPL CALCULATED.3IONS-SCNC: 7 MMOL/L (ref 5–18)
BUN SERPL-MCNC: 10 MG/DL (ref 8–22)
CALCIUM SERPL-MCNC: 9.7 MG/DL (ref 8.5–10.5)
CHLORIDE BLD-SCNC: 94 MMOL/L (ref 98–107)
CO2 SERPL-SCNC: 31 MMOL/L (ref 22–31)
CREAT SERPL-MCNC: 0.73 MG/DL (ref 0.6–1.1)
GFR SERPL CREATININE-BSD FRML MDRD: >60 ML/MIN/1.73M2
GLUCOSE BLD-MCNC: 97 MG/DL (ref 70–125)
HGB BLD-MCNC: 9.4 G/DL (ref 12–16)
MAGNESIUM SERPL-MCNC: 1.9 MG/DL (ref 1.8–2.6)
POTASSIUM BLD-SCNC: 5 MMOL/L (ref 3.5–5)
SODIUM SERPL-SCNC: 132 MMOL/L (ref 136–145)

## 2019-04-12 ASSESSMENT — MIFFLIN-ST. JEOR: SCORE: 890.42

## 2019-04-15 ENCOUNTER — COMMUNICATION - HEALTHEAST (OUTPATIENT)
Dept: FAMILY MEDICINE | Facility: CLINIC | Age: 69
End: 2019-04-15

## 2019-04-16 ENCOUNTER — TELEPHONE (OUTPATIENT)
Dept: ORTHOPEDICS | Facility: CLINIC | Age: 69
End: 2019-04-16

## 2019-04-16 DIAGNOSIS — G89.29 CHRONIC RIGHT-SIDED LOW BACK PAIN WITH RIGHT-SIDED SCIATICA: Primary | ICD-10-CM

## 2019-04-16 DIAGNOSIS — M54.41 CHRONIC RIGHT-SIDED LOW BACK PAIN WITH RIGHT-SIDED SCIATICA: Primary | ICD-10-CM

## 2019-04-16 RX ORDER — GABAPENTIN 300 MG/1
300 CAPSULE ORAL 3 TIMES DAILY
Qty: 90 CAPSULE | Refills: 0 | Status: SHIPPED | OUTPATIENT
Start: 2019-04-16 | End: 2022-02-23

## 2019-04-16 NOTE — TELEPHONE ENCOUNTER
Message from Dr Ventura asked that pt start gabapentin with slow increase dosing.  Pt was phoned and given these directions.  She also was scheduled to return to see Dr Ventura earlier than previously scheduled.  Dori Penaloza RN

## 2019-04-18 DIAGNOSIS — Z98.890 STATUS POST LUMBAR SPINE SURGERY FOR DECOMPRESSION OF SPINAL CORD: ICD-10-CM

## 2019-04-18 RX ORDER — OXYCODONE HYDROCHLORIDE 5 MG/1
5 TABLET ORAL
Qty: 40 TABLET | Refills: 0 | Status: SHIPPED | OUTPATIENT
Start: 2019-04-18 | End: 2019-04-25

## 2019-04-18 NOTE — TELEPHONE ENCOUNTER
Leeanne underwent L3-4 TLIF on 4/3/19, was sent home with #50 oxycodone.  Pt phoned Dr Ventura for refill.  Pt was phoned back by RN and voicemail left for pt to call RN directly so we can review pain management plan and refill.  Dori Penaloza RN    Pt called back.  She is having difficulty sleeping due to leg pain.  Pt is taking tylenol with oxycodone, states the leg pain is mostly at night after 10PM.  Pt states her back pain is not the issue at this time, it's the radiating right leg pain.  She has been using heat and ice.  Pt has not started gabapentin due to history of it causing depression.  We discussed trying gabapentin just at HS to help with the nerve issues since pt has not been sleeping and watching her mental status while taking the gabapentin, limiting her tylenol to 3250mg/24hrs.     Rx for oxycodone was renewed and placed in the [facility] locked box.  Dori Penaloza RN

## 2019-04-19 ENCOUNTER — OFFICE VISIT (OUTPATIENT)
Dept: ORTHOPEDICS | Facility: CLINIC | Age: 69
End: 2019-04-19
Payer: COMMERCIAL

## 2019-04-19 ENCOUNTER — ANCILLARY PROCEDURE (OUTPATIENT)
Dept: GENERAL RADIOLOGY | Facility: CLINIC | Age: 69
End: 2019-04-19
Attending: ORTHOPAEDIC SURGERY
Payer: COMMERCIAL

## 2019-04-19 ENCOUNTER — RECORDS - HEALTHEAST (OUTPATIENT)
Dept: ADMINISTRATIVE | Facility: OTHER | Age: 69
End: 2019-04-19

## 2019-04-19 VITALS — WEIGHT: 98 LBS | HEIGHT: 58 IN | BODY MASS INDEX: 20.57 KG/M2

## 2019-04-19 DIAGNOSIS — M54.50 LUMBAR PAIN: Primary | ICD-10-CM

## 2019-04-19 RX ORDER — METHYLPREDNISOLONE 4 MG
8 TABLET, DOSE PACK ORAL SEE ADMIN INSTRUCTIONS
Qty: 21 TABLET | Refills: 0 | Status: SHIPPED | OUTPATIENT
Start: 2019-04-19 | End: 2022-02-23

## 2019-04-19 ASSESSMENT — ENCOUNTER SYMPTOMS
WEAKNESS: 1
SPEECH CHANGE: 0
MEMORY LOSS: 0
JOINT SWELLING: 0
LOSS OF CONSCIOUSNESS: 0
STIFFNESS: 1
HEADACHES: 0
TREMORS: 0
PARALYSIS: 0
SEIZURES: 0
NUMBNESS: 0
TINGLING: 1
ARTHRALGIAS: 1
MYALGIAS: 1
DIZZINESS: 1
MUSCLE WEAKNESS: 1
BACK PAIN: 1
MUSCLE CRAMPS: 0
NECK PAIN: 0
DISTURBANCES IN COORDINATION: 1

## 2019-04-19 ASSESSMENT — MIFFLIN-ST. JEOR: SCORE: 859.28

## 2019-04-19 NOTE — LETTER
4/19/2019       RE: Leeanne Duarte  [id number]  [place] [address]     Dear Colleague,    Thank you for referring your patient, Leeanne Duarte, [facility]. Please see a copy of my visit note below.    Post-Operative Return Visit Within 90-days    Surgery: L3-4 decompression, L4-pelvis fusion    Subjective:  Leaenne Duarte is a 68 year old female who underwent the above mentioned procedure.  She returns to clinic today because she had an onset right radicular pain starting this weekend.  She denies any fall or trauma.  Prior to the onset of this pain, she is actually doing quite well.  Her back pain has completely resolved.  However on Sunday she woke up with tingling, burning, aching pain in her right groin, radiating down to knee and medial leg.  But it becomes severe and unmanageable at night.  She is unable to sleep because of the pain.  During the day she really has minimal pain in the leg,  She also feels that her right leg is weaker, even when it is not painful.    She did try gabapentin last night even though she has a history of side effect of depression with this medication, and was able to get better sleep last night.    Musculoskeletal Exam:   The incision shows no signs of drainage.       Lumbar Spine:    Appearance - No gross stepoffs or deformities    Motor -     L2-3: Hip flexion 5/5 R and 5/5 L strength          L3/4:  Knee extension R 5/5 and L 5/5 strength         L4/5:  Foot dorsiflexion R 4/5 L 5/5 and       EHL dorsiflexion R 4/5 L 4/5 strength         S1:  Plantarflexion/Peroneal Muscles  R 5/5 and L 5/5 strength    Sensation: intact to light touch L3-S1 distribution BLE    Imaging:  AP and lateral view of the lumbar spine obtained 4/19/19 shows stable and appropriate placement of implants.  No evidence of loosening or implant failure.  Alignment well maintained compared to previous films from  4/3/19    Assessment and Plan:   2 weeks status post L3-4 decompression, L4-pelvis fusion with new onset of L4 radiculopathy without evidence of implant failure or fracture.    We have carefully reviewed Leeanne's images we do not see anything that can specifically explain her new radicular symptoms.  Since her back pain is completely resolved compared to preop, we have very low suspicion that she has a pedicle fracture that we cannot see on x-ray.  Therefore we would like to start her on a Medrol Dosepak as well as continue her on gabapentin if she feels comfortable taking this medication.  We are hopeful that between these 2 medications it will calm down her nerve irritation.  If she has no improvement, we would consider getting a CT scan to further evaluate for sources of impingement or fracture.  Leeanne will keep her follow-up appointment scheduled for 5/7/19, but will contact us if she has any new concerns.    This patient was seen and discussed with Dr. Ventura who is in agreement with this plan    Francisca Uribe, PGY 4    Attending MD (Dr. Lane Ventura) :  I reviewed and verified the history and physical exam of the patient and discussed the patient's management with the other clinical providers involved in this patient's care including any involved residents or physicians assistants. I reviewed the above note and agree with the documented findings and plan of care, which were communicated to the patient.        Again, thank you for allowing me to participate in the care of your patient.      Sincerely,    Lane Ventura MD

## 2019-04-19 NOTE — NURSING NOTE
"Reason For Visit:   Chief Complaint   Patient presents with     Surgical Followup     2 week POP DOS 4/4/19       Primary MD: Renée Horne  Ref. MD: SSelf  Date of surgery: 4/4/19  Type of surgery: Dr. Ventura .  Smoker: No  Request smoking cessation information: No    Ht 1.473 m (4' 10\")   Wt 44.5 kg (98 lb)   BMI 20.48 kg/m      Pain Assessment  Patient Currently in Pain: Yes  0-10 Pain Scale: 10  Primary Pain Location: Back  Pain Descriptors: Radiating(right sided nawing ach new symptom )    Oswestry (BEENA) Questionnaire    OSWESTRY DISABILITY INDEX 4/19/2019   Count 9   Sum 17   Oswestry Score (%) 37.78   Some recent data might be hidden            Neck Disability Index (NDI) Questionnaire    No flowsheet data found.                Promis 10 Assessment    PROMIS 10 4/19/2019   In general, would you say your health is: Good   In general, would you say your quality of life is: Good   In general, how would you rate your physical health? Fair   In general, how would you rate your mental health, including your mood and your ability to think? Good   In general, how would you rate your satisfaction with your social activities and relationships? Very good   In general, please rate how well you carry out your usual social activities and roles Good   To what extent are you able to carry out your everyday physical activities such as walking, climbing stairs, carrying groceries, or moving a chair? A little   How often have you been bothered by emotional problems such as feeling anxious, depressed or irritable? Sometimes   How would you rate your fatigue on average? Moderate   How would you rate your pain on average?   0 = No Pain  to  10 = Worst Imaginable Pain 7   Global Physical Health Score : Raw Score -   Global Mental Health Score : Raw Score -   Total (Physical + Mental Health Score) -   In general, would you say your health is: 3   In general, would you say your quality of life is: 3   In general, how " would you rate your physical health? 2   In general, how would you rate your mental health, including your mood and your ability to think? 3   In general, how would you rate your satisfaction with your social activities and relationships? 4   In general, please rate how well you carry out your usual social activities and roles. (This includes activities at home, at work and in your community, and responsibilities as a parent, child, spouse, employee, friend, etc.) 3   To what extent are you able to carry out your everyday physical activities such as walking, climbing stairs, carrying groceries, or moving a chair? 2   In the past 7 days, how often have you been bothered by emotional problems such as feeling anxious, depressed, or irritable? 3   In the past 7 days, how would you rate your fatigue on average? 3   In the past 7 days, how would you rate your pain on average, where 0 means no pain, and 10 means worst imaginable pain? 7   Global Mental Health Score 13   Global Physical Health Score 9   PROMIS TOTAL - SUBSCORES 22   Some recent data might be hidden                Randell Esteves ATC

## 2019-04-19 NOTE — PROGRESS NOTES
Post-Operative Return Visit Within 90-days    Surgery: L3-4 decompression, L4-pelvis fusion    Subjective:  Leeanne Duarte is a 68 year old female who underwent the above mentioned procedure.  She returns to clinic today because she had an onset right radicular pain starting this weekend.  She denies any fall or trauma.  Prior to the onset of this pain, she is actually doing quite well.  Her back pain has completely resolved.  However on Sunday she woke up with tingling, burning, aching pain in her right groin, radiating down to knee and medial leg.  But it becomes severe and unmanageable at night.  She is unable to sleep because of the pain.  During the day she really has minimal pain in the leg,  She also feels that her right leg is weaker, even when it is not painful.    She did try gabapentin last night even though she has a history of side effect of depression with this medication, and was able to get better sleep last night.    Musculoskeletal Exam:   The incision shows no signs of drainage.       Lumbar Spine:    Appearance - No gross stepoffs or deformities    Motor -     L2-3: Hip flexion 5/5 R and 5/5 L strength          L3/4:  Knee extension R 5/5 and L 5/5 strength         L4/5:  Foot dorsiflexion R 4/5 L 5/5 and       EHL dorsiflexion R 4/5 L 4/5 strength         S1:  Plantarflexion/Peroneal Muscles  R 5/5 and L 5/5 strength    Sensation: intact to light touch L3-S1 distribution BLE    Imaging:  AP and lateral view of the lumbar spine obtained 4/19/19 shows stable and appropriate placement of implants.  No evidence of loosening or implant failure.  Alignment well maintained compared to previous films from 4/3/19    Assessment and Plan:   2 weeks status post L3-4 decompression, L4-pelvis fusion with new onset of L4 radiculopathy without evidence of implant failure or fracture.    We have carefully reviewed Leeanne's images we do not see anything that can specifically explain her new radicular  symptoms.  Since her back pain is completely resolved compared to preop, we have very low suspicion that she has a pedicle fracture that we cannot see on x-ray.  Therefore we would like to start her on a Medrol Dosepak as well as continue her on gabapentin if she feels comfortable taking this medication.  We are hopeful that between these 2 medications it will calm down her nerve irritation.  If she has no improvement, we would consider getting a CT scan to further evaluate for sources of impingement or fracture.  Leeanne will keep her follow-up appointment scheduled for 5/7/19, but will contact us if she has any new concerns.    This patient was seen and discussed with Dr. Ventura who is in agreement with this plan    Francisca Uribe, PGY 4    Attending MD (Dr. Lane Ventura) :  I reviewed and verified the history and physical exam of the patient and discussed the patient's management with the other clinical providers involved in this patient's care including any involved residents or physicians assistants. I reviewed the above note and agree with the documented findings and plan of care, which were communicated to the patient.      Lane Ventura MD    Answers for HPI/ROS submitted by the patient on 4/19/2019   General Symptoms: No  Skin Symptoms: No  HENT Symptoms: No  EYE SYMPTOMS: No  HEART SYMPTOMS: No  LUNG SYMPTOMS: No  INTESTINAL SYMPTOMS: No  URINARY SYMPTOMS: No  GYNECOLOGIC SYMPTOMS: No  BREAST SYMPTOMS: No  SKELETAL SYMPTOMS: Yes  BLOOD SYMPTOMS: No  NERVOUS SYSTEM SYMPTOMS: Yes  MENTAL HEALTH SYMPTOMS: No  Back pain: Yes  Muscle aches: Yes  Neck pain: No  Swollen joints: No  Joint pain: Yes  Bone pain: Yes  Muscle cramps: No  Muscle weakness: Yes  Joint stiffness: Yes  Bone fracture: No  Trouble with coordination: Yes  Dizziness or trouble with balance: Yes  Fainting or black-out spells: No  Memory loss: No  Headache: No  Seizures: No  Speech problems: No  Tingling: Yes  Tremor:  No  Weakness: Yes  Difficulty walking: Yes  Paralysis: No  Numbness: No

## 2019-04-25 ENCOUNTER — TELEPHONE (OUTPATIENT)
Dept: ORTHOPEDICS | Facility: CLINIC | Age: 69
End: 2019-04-25

## 2019-04-25 DIAGNOSIS — Z98.890 STATUS POST LUMBAR SPINE SURGERY FOR DECOMPRESSION OF SPINAL CORD: ICD-10-CM

## 2019-04-25 RX ORDER — OXYCODONE HYDROCHLORIDE 5 MG/1
5 TABLET ORAL
Qty: 40 TABLET | Refills: 0 | Status: SHIPPED | OUTPATIENT
Start: 2019-04-25 | End: 2019-05-07

## 2019-04-25 NOTE — TELEPHONE ENCOUNTER
[facility] Call Center    Phone Message    May a detailed message be left on voicemail: yes    Reason for Call: Medication Refill Request    Has the patient contacted the pharmacy for the refill? Yes   Name of medication being requested: oxyCODONE (ROXICODONE) 5 MG tablet  Provider who prescribed the medication: Dr. Venutra  Pharmacy: [facility]  Date medication is needed: Today - Pt is has one pill left, will only last until late rthis afternoon. Wondering if they can  today. PLEASE give  Ole a call to his cell phone when filled    Action Taken: Message routed to:  [facility]): Ortho

## 2019-04-29 ENCOUNTER — TELEPHONE (OUTPATIENT)
Dept: ORTHOPEDICS | Facility: CLINIC | Age: 69
End: 2019-04-29

## 2019-04-29 NOTE — TELEPHONE ENCOUNTER
Pt underwent L4-S1 TLIF on 4/3/19.  Pt was phoned back by RN and we reviewed postop pain management with goal to be weaned by 6 weeks postop.  Pt presently taking 1 oxycodone every 6 hrs.  She will continue to taper while increasing walking as tolerated.  We will see her back for the 6 wk postop visit in two weeks.  Dori Penaloza RN

## 2019-04-29 NOTE — TELEPHONE ENCOUNTER
[facility]    Phone Message    May a detailed message be left on voicemail: yes    Reason for Call: Other: Pt of Dr. Ventura calling to speak with Dori about weaning off of the Oxycodone medicine.  Please have Dori call the pt back     Action Taken: Message routed to:  [facility] ([facility]): Ortho

## 2019-05-06 DIAGNOSIS — M54.50 LUMBAR PAIN: Primary | ICD-10-CM

## 2019-05-07 ENCOUNTER — RECORDS - HEALTHEAST (OUTPATIENT)
Dept: ADMINISTRATIVE | Facility: OTHER | Age: 69
End: 2019-05-07

## 2019-05-07 ENCOUNTER — ANCILLARY PROCEDURE (OUTPATIENT)
Dept: GENERAL RADIOLOGY | Facility: CLINIC | Age: 69
End: 2019-05-07
Attending: ORTHOPAEDIC SURGERY
Payer: COMMERCIAL

## 2019-05-07 ENCOUNTER — OFFICE VISIT (OUTPATIENT)
Dept: ORTHOPEDICS | Facility: CLINIC | Age: 69
End: 2019-05-07
Payer: COMMERCIAL

## 2019-05-07 VITALS — HEIGHT: 58 IN | WEIGHT: 98 LBS | BODY MASS INDEX: 20.57 KG/M2

## 2019-05-07 DIAGNOSIS — M54.50 LUMBAR PAIN: Primary | ICD-10-CM

## 2019-05-07 DIAGNOSIS — Z98.890 STATUS POST LUMBAR SPINE SURGERY FOR DECOMPRESSION OF SPINAL CORD: ICD-10-CM

## 2019-05-07 RX ORDER — OXYCODONE HYDROCHLORIDE 5 MG/1
5 TABLET ORAL EVERY 8 HOURS PRN
Qty: 20 TABLET | Refills: 0 | Status: SHIPPED | OUTPATIENT
Start: 2019-05-07 | End: 2019-05-30

## 2019-05-07 ASSESSMENT — ENCOUNTER SYMPTOMS
MYALGIAS: 1
MUSCLE CRAMPS: 0
NECK PAIN: 0
ARTHRALGIAS: 1
STIFFNESS: 1
BACK PAIN: 1
MUSCLE WEAKNESS: 1
JOINT SWELLING: 0

## 2019-05-07 ASSESSMENT — MIFFLIN-ST. JEOR: SCORE: 854.28

## 2019-05-07 NOTE — NURSING NOTE
"Reason For Visit:   Chief Complaint   Patient presents with     Surgical Followup     6 week POP lumbar 4/3/19       Primary MD: Renée Horne  Ref. MD: Self  Date of surgery: 4/3/*19  Type of surgery: Dr. Ventura .  Smoker: No  Request smoking cessation information: No    Ht 1.473 m (4' 10\")   Wt 44.5 kg (98 lb)   BMI 20.48 kg/m      Pain Assessment  Patient Currently in Pain: Yes  0-10 Pain Scale: 2  Primary Pain Location: Back    Oswestry (BEENA) Questionnaire    OSWESTRY DISABILITY INDEX 4/19/2019   Count 9   Sum 17   Oswestry Score (%) 37.78   Some recent data might be hidden            Neck Disability Index (NDI) Questionnaire    No flowsheet data found.                Promis 10 Assessment    PROMIS 10 5/7/2019   In general, would you say your health is: Good   In general, would you say your quality of life is: Good   In general, how would you rate your physical health? Good   In general, how would you rate your mental health, including your mood and your ability to think? Good   In general, how would you rate your satisfaction with your social activities and relationships? Very good   In general, please rate how well you carry out your usual social activities and roles Very good   To what extent are you able to carry out your everyday physical activities such as walking, climbing stairs, carrying groceries, or moving a chair? Moderately   How often have you been bothered by emotional problems such as feeling anxious, depressed or irritable? Rarely   How would you rate your fatigue on average? Moderate   How would you rate your pain on average?   0 = No Pain  to  10 = Worst Imaginable Pain 7   Global Physical Health Score : Raw Score -   Global Mental Health Score : Raw Score -   Total (Physical + Mental Health Score) -   In general, would you say your health is: 3   In general, would you say your quality of life is: 3   In general, how would you rate your physical health? 3   In general, how would " you rate your mental health, including your mood and your ability to think? 3   In general, how would you rate your satisfaction with your social activities and relationships? 4   In general, please rate how well you carry out your usual social activities and roles. (This includes activities at home, at work and in your community, and responsibilities as a parent, child, spouse, employee, friend, etc.) 4   To what extent are you able to carry out your everyday physical activities such as walking, climbing stairs, carrying groceries, or moving a chair? 3   In the past 7 days, how often have you been bothered by emotional problems such as feeling anxious, depressed, or irritable? 2   In the past 7 days, how would you rate your fatigue on average? 3   In the past 7 days, how would you rate your pain on average, where 0 means no pain, and 10 means worst imaginable pain? 7   Global Mental Health Score 14   Global Physical Health Score 11   PROMIS TOTAL - SUBSCORES 25   Some recent data might be hidden                Randell Esteves ATC

## 2019-05-07 NOTE — LETTER
5/7/2019       RE: Leeanne Duarte  [address]  [place] [address]     Dear Colleague,    Thank you for referring your patient, Leeanne Duarte, to the [facility]. Please see a copy of my visit note below.    Spine Surgery Return Clinic Visit      Chief Complaint:   Surgical Followup (6 week POP lumbar 4/3/19)      Interval HPI:  Symptom Profile Including: location of symptoms, onset, severity, exacerbating/alleviating factors, previous treatments:        Leeanne Duarte is a 69 year old female who returns now 6 weeks status post L4 to the pelvis instrumented fusion with an L3-4 decompression.  She says she has no back pain.  She really thinks about her back.  At this point she is already very happy with surgery.  Her wound has healed well.  When I saw her a couple of weeks ago she was having pretty significant right leg radicular symptoms mostly in an L4 distribution.  She says this is about 50 to 70% better.  She no longer has any symptoms in the thigh.  She is having some pain around the knee and over the anterior aspect of the right shin but it is much more tolerable than it was and at this point she feels quite happy.            Past Medical History:   No past medical history on file.         Past Surgical History:     Past Surgical History:   Procedure Laterality Date     CATARACT IOL, RT/LT  2014     HARVEST BONE MARROW FROM ILIAC CREST Right 4/3/2019    Procedure: [place] Bone Marrow From Iliac Crest;  Surgeon: Lane Ventura MD;  Location:  OR     OPTICAL TRACKING SYSTEM FUSION SPINE POSTERIOR LUMBAR THREE+ LEVELS N/A 4/3/2019    Procedure: Lumbar 3-4 Decompression, Lumbar 4-Sacral 1 Transforaminal Lumbar Interbody Fusion, Lumbar 5-Sacral 1 Kaminski Maxwell Osteotomy, Lumbar 4-Pelvis Instrumentation, Illiac Crest Autograft;  Surgeon: Lane Ventura MD;  Location:  OR            Social  "History:     Social History     Tobacco Use     Smoking status: Never Smoker     Smokeless tobacco: Never Used   Substance Use Topics     Alcohol use: Yes     Comment: 7 per week            Family History:     Family History   Problem Relation Age of Onset     Colon Cancer Maternal Grandmother      Colon Cancer Maternal Grandfather      High cholesterol Mother      Hypertension Mother      Myocardial Infarction Mother 72     High cholesterol Father      Hypertension Father      Myocardial Infarction Father 76            Allergies:     Allergies   Allergen Reactions     Chocolate      Orange      Gabapentin      Confusion              Medications:     Current Outpatient Medications   Medication     denosumab (PROLIA) 60 MG/ML SOSY injection     oxyCODONE (ROXICODONE) 5 MG tablet     acetaminophen (TYLENOL) 325 MG tablet     Ascorbic Acid (VITAMIN C) 500 MG CAPS     Calcium Citrate-Vitamin D (CALCIUM CITRATE + D) 300-100 MG-UNIT TABS     diazepam (VALIUM) 5 MG tablet     escitalopram (LEXAPRO) 10 MG tablet     FLUAD 0.5 ML TALI     gabapentin (NEURONTIN) 300 MG capsule     LORAZEPAM PO     methylPREDNISolone (MEDROL DOSEPAK) 4 MG tablet therapy pack     senna-docusate (SENOKOT-S/PERICOLACE) 8.6-50 MG tablet     No current facility-administered medications for this visit.              Review of Systems:   A focused musculoskeletal and neurologic ROS was performed with pertinent positives and negatives noted in the HPI.  Additional systems were also reviewed and are documented at the bottom of the note.         Physical Exam:   Vitals: Ht 1.473 m (4' 10\")   Wt 44.5 kg (98 lb)   BMI 20.48 kg/m     Musculoskeletal, Neurologic, and Spine:          Lumbar Spine:    Appearance - No gross stepoffs or deformities    Motor -     L2-3: Hip flexion 5/5 R and 5/5 L strength          L3/4:  Knee extension R 5/5 and L 5/5 strength         L4/5:  Foot dorsiflexion R 4+/5 L 5/5 and       EHL dorsiflexion R 4/5 L 4/5 strength       "   S1:  Plantarflexion/Peroneal Muscles  R 5/5 and L 5/5 strength    Sensation: intact to light touch L3-S1 distribution BLE          Hip Exam:  No pain with hip log roll and no tenderness over the greater trochanters.    Alignment:  Patient stands with a neutral standing sagittal balance.           Imaging:   We ordered and independently reviewed new radiographs at this clinic visit. The results were discussed with the patient. Findings include:     AP and lateral lumbar spine films today show unchanged alignment and position of the implants.  Radiology has noted some possible lucency around the left L4 screw, but it looks unchanged to me compared to the previous images.       Assessment and Plan:     69 year old female with good clinical result thus far after lumbar fusion surgery.  I showed her the images.  I told her the keys moving forward will be getting the bones to heal.  We will continue to follow this with 6 weeks for x-rays.  I will follow-up with her in clinic at that time and in the meantime I would like her to be doing some therapy to work on lower extremity strengthening.           Respectfully,  Lane Ventura MD  Spine Surgery  [facility]    Answers for HPI/ROS submitted by the patient on 5/7/2019   General Symptoms: No  Skin Symptoms: No  HENT Symptoms: No  EYE SYMPTOMS: No  HEART SYMPTOMS: No  LUNG SYMPTOMS: No  INTESTINAL SYMPTOMS: No  URINARY SYMPTOMS: No  GYNECOLOGIC SYMPTOMS: No  BREAST SYMPTOMS: No  SKELETAL SYMPTOMS: Yes  BLOOD SYMPTOMS: No  NERVOUS SYSTEM SYMPTOMS: No  MENTAL HEALTH SYMPTOMS: No  Back pain: Yes  Muscle aches: Yes  Neck pain: No  Swollen joints: No  Joint pain: Yes  Bone pain: Yes  Muscle cramps: No  Muscle weakness: Yes  Joint stiffness: Yes  Bone fracture: No    Again, thank you for allowing me to participate in the care of your patient.      Sincerely,    Lane Ventura MD

## 2019-05-08 NOTE — PROGRESS NOTES
Spine Surgery Return Clinic Visit      Chief Complaint:   Surgical Followup (6 week POP lumbar 4/3/19)      Interval HPI:  Symptom Profile Including: location of symptoms, onset, severity, exacerbating/alleviating factors, previous treatments:        Leeanne Duarte is a 69 year old female who returns now 6 weeks status post L4 to the pelvis instrumented fusion with an L3-4 decompression.  She says she has no back pain.  She really thinks about her back.  At this point she is already very happy with surgery.  Her wound has healed well.  When I saw her a couple of weeks ago she was having pretty significant right leg radicular symptoms mostly in an L4 distribution.  She says this is about 50 to 70% better.  She no longer has any symptoms in the thigh.  She is having some pain around the knee and over the anterior aspect of the right shin but it is much more tolerable than it was and at this point she feels quite happy.            Past Medical History:   No past medical history on file.         Past Surgical History:     Past Surgical History:   Procedure Laterality Date     CATARACT IOL, RT/LT  2014     HARVEST BONE MARROW FROM ILIAC CREST Right 4/3/2019    Procedure: [place] Bone Marrow From Iliac Crest;  Surgeon: Lane Ventura MD;  Location: [facility] OR     OPTICAL TRACKING SYSTEM FUSION SPINE POSTERIOR LUMBAR THREE+ LEVELS N/A 4/3/2019    Procedure: Lumbar 3-4 Decompression, Lumbar 4-Sacral 1 Transforaminal Lumbar Interbody Fusion, Lumbar 5-Sacral 1 Kaminski Maxwell Osteotomy, Lumbar 4-Pelvis Instrumentation, Illiac Crest Autograft;  Surgeon: Lane Ventura MD;  Location: [facility] OR            Social History:     Social History     Tobacco Use     Smoking status: Never Smoker     Smokeless tobacco: Never Used   Substance Use Topics     Alcohol use: Yes     Comment: 7 per week            Family History:     Family History   Problem Relation Age of Onset     Colon Cancer Maternal Grandmother       "Colon Cancer Maternal Grandfather      High cholesterol Mother      Hypertension Mother      Myocardial Infarction Mother 72     High cholesterol Father      Hypertension Father      Myocardial Infarction Father 76            Allergies:     Allergies   Allergen Reactions     Chocolate      Orange      Gabapentin      Confusion              Medications:     Current Outpatient Medications   Medication     denosumab (PROLIA) 60 MG/ML SOSY injection     oxyCODONE (ROXICODONE) 5 MG tablet     acetaminophen (TYLENOL) 325 MG tablet     Ascorbic Acid (VITAMIN C) 500 MG CAPS     Calcium Citrate-Vitamin D (CALCIUM CITRATE + D) 300-100 MG-UNIT TABS     diazepam (VALIUM) 5 MG tablet     escitalopram (LEXAPRO) 10 MG tablet     FLUAD 0.5 ML TALI     gabapentin (NEURONTIN) 300 MG capsule     LORAZEPAM PO     methylPREDNISolone (MEDROL DOSEPAK) 4 MG tablet therapy pack     senna-docusate (SENOKOT-S/PERICOLACE) 8.6-50 MG tablet     No current facility-administered medications for this visit.              Review of Systems:   A focused musculoskeletal and neurologic ROS was performed with pertinent positives and negatives noted in the HPI.  Additional systems were also reviewed and are documented at the bottom of the note.         Physical Exam:   Vitals: Ht 1.473 m (4' 10\")   Wt 44.5 kg (98 lb)   BMI 20.48 kg/m    Musculoskeletal, Neurologic, and Spine:          Lumbar Spine:    Appearance - No gross stepoffs or deformities    Motor -     L2-3: Hip flexion 5/5 R and 5/5 L strength          L3/4:  Knee extension R 5/5 and L 5/5 strength         L4/5:  Foot dorsiflexion R 4+/5 L 5/5 and       EHL dorsiflexion R 4/5 L 4/5 strength         S1:  Plantarflexion/Peroneal Muscles  R 5/5 and L 5/5 strength    Sensation: intact to light touch L3-S1 distribution BLE          Hip Exam:  No pain with hip log roll and no tenderness over the greater trochanters.    Alignment:  Patient stands with a neutral standing sagittal balance.           " Imaging:   We ordered and independently reviewed new radiographs at this clinic visit. The results were discussed with the patient. Findings include:     AP and lateral lumbar spine films today show unchanged alignment and position of the implants.  Radiology has noted some possible lucency around the left L4 screw, but it looks unchanged to me compared to the previous images.       Assessment and Plan:     69 year old female with good clinical result thus far after lumbar fusion surgery.  I showed her the images.  I told her the keys moving forward will be getting the bones to heal.  We will continue to follow this with 6 weeks for x-rays.  I will follow-up with her in clinic at that time and in the meantime I would like her to be doing some therapy to work on lower extremity strengthening.           Respectfully,  Lane Ventura MD  Spine Surgery  [facility]    Answers for HPI/ROS submitted by the patient on 5/7/2019   General Symptoms: No  Skin Symptoms: No  HENT Symptoms: No  EYE SYMPTOMS: No  HEART SYMPTOMS: No  LUNG SYMPTOMS: No  INTESTINAL SYMPTOMS: No  URINARY SYMPTOMS: No  GYNECOLOGIC SYMPTOMS: No  BREAST SYMPTOMS: No  SKELETAL SYMPTOMS: Yes  BLOOD SYMPTOMS: No  NERVOUS SYSTEM SYMPTOMS: No  MENTAL HEALTH SYMPTOMS: No  Back pain: Yes  Muscle aches: Yes  Neck pain: No  Swollen joints: No  Joint pain: Yes  Bone pain: Yes  Muscle cramps: No  Muscle weakness: Yes  Joint stiffness: Yes  Bone fracture: No

## 2019-05-10 ENCOUNTER — THERAPY VISIT (OUTPATIENT)
Dept: PHYSICAL THERAPY | Facility: CLINIC | Age: 69
End: 2019-05-10
Attending: ORTHOPAEDIC SURGERY
Payer: COMMERCIAL

## 2019-05-10 DIAGNOSIS — M54.50 LUMBAR PAIN: ICD-10-CM

## 2019-05-10 DIAGNOSIS — Z98.1 S/P SPINAL FUSION: ICD-10-CM

## 2019-05-10 PROCEDURE — 97110 THERAPEUTIC EXERCISES: CPT | Mod: GP | Performed by: PHYSICAL THERAPIST

## 2019-05-10 PROCEDURE — 97162 PT EVAL MOD COMPLEX 30 MIN: CPT | Mod: GP | Performed by: PHYSICAL THERAPIST
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
## 2019-05-13 ENCOUNTER — TELEPHONE (OUTPATIENT)
Dept: ORTHOPEDICS | Facility: CLINIC | Age: 69
End: 2019-05-13

## 2019-05-13 NOTE — TELEPHONE ENCOUNTER
[facility]    Phone Message    May a detailed message be left on voicemail: yes    Reason for Call: Other: Pt would like to speak to Dori regarding tapering off pain medication. Gabapentin. Please call pt to discuss.      Action Taken: Message routed to:  [facility] ([facility] Ortho

## 2019-05-13 NOTE — TELEPHONE ENCOUNTER
Pt underwent L3-4 decompression, and L4-S1 fusion on 4/3/19.  Pt was called back by RN regarding her pain management which was discussed with pt's .  She was not able to tolerate taking gabapentin 300mg twice daily, is back to once daily.  She is taking oxycodone once daily.  We reviewed other options which may include 100mg gabapentin more often instead of 300mg.  Weaning off oxycodone.  Pt will consider her options and get back to us if she would like a change.  Dori ePnaloza RN

## 2019-05-14 ENCOUNTER — TELEPHONE (OUTPATIENT)
Dept: ORTHOPEDICS | Facility: CLINIC | Age: 69
End: 2019-05-14

## 2019-05-14 DIAGNOSIS — G89.29 CHRONIC RIGHT-SIDED LOW BACK PAIN WITH RIGHT-SIDED SCIATICA: Primary | ICD-10-CM

## 2019-05-14 DIAGNOSIS — M54.41 CHRONIC RIGHT-SIDED LOW BACK PAIN WITH RIGHT-SIDED SCIATICA: Primary | ICD-10-CM

## 2019-05-14 NOTE — TELEPHONE ENCOUNTER
Pt underwent L3-4 decompression, L4-S1 TLIF on 4/3/19.  Pt was phoned back by RN regarding request for pain clinic.  Voicemail was left that we would be happy to help pt with her postop pain management at this time.  Dr Ventura also offered to order Right sided L4-5 transforaminal NATALYA if pt is interested.  Pt was asked to call back so we can discuss the options.    Dori Penaloza RN    Pt phoned back and agreed to try the steroid injection.  Order was entered and pt was given radiology scheduling to set it up.  Pt was not interested in changing or increasing her gabapentin at this time.  Dori Penaloza RN

## 2019-05-14 NOTE — TELEPHONE ENCOUNTER
[facility]    Phone Message    May a detailed message be left on voicemail: yes    Reason for Call: Order(s): Other:   Reason for requested: Referral for Pain clinic   Date needed: Please call pt once order have been placede  Provider name: Dr. Ventura       Action Taken: Message routed to:  [facility] Orthopedics

## 2019-05-15 ENCOUNTER — THERAPY VISIT (OUTPATIENT)
Dept: PHYSICAL THERAPY | Facility: CLINIC | Age: 69
End: 2019-05-15
Payer: COMMERCIAL

## 2019-05-15 DIAGNOSIS — Z98.1 S/P SPINAL FUSION: ICD-10-CM

## 2019-05-15 DIAGNOSIS — G89.29 CHRONIC RIGHT-SIDED LOW BACK PAIN WITH RIGHT-SIDED SCIATICA: ICD-10-CM

## 2019-05-15 DIAGNOSIS — M54.41 CHRONIC RIGHT-SIDED LOW BACK PAIN WITH RIGHT-SIDED SCIATICA: ICD-10-CM

## 2019-05-15 PROCEDURE — 97110 THERAPEUTIC EXERCISES: CPT | Mod: GP

## 2019-05-24 ENCOUNTER — THERAPY VISIT (OUTPATIENT)
Dept: PHYSICAL THERAPY | Facility: CLINIC | Age: 69
End: 2019-05-24
Payer: COMMERCIAL

## 2019-05-24 DIAGNOSIS — M54.41 CHRONIC RIGHT-SIDED LOW BACK PAIN WITH RIGHT-SIDED SCIATICA: ICD-10-CM

## 2019-05-24 DIAGNOSIS — Z98.1 S/P SPINAL FUSION: ICD-10-CM

## 2019-05-24 DIAGNOSIS — G89.29 CHRONIC RIGHT-SIDED LOW BACK PAIN WITH RIGHT-SIDED SCIATICA: ICD-10-CM

## 2019-05-24 PROCEDURE — 97110 THERAPEUTIC EXERCISES: CPT | Mod: GP

## 2019-05-29 ENCOUNTER — THERAPY VISIT (OUTPATIENT)
Dept: PHYSICAL THERAPY | Facility: CLINIC | Age: 69
End: 2019-05-29
Payer: COMMERCIAL

## 2019-05-29 DIAGNOSIS — Z98.1 S/P SPINAL FUSION: ICD-10-CM

## 2019-05-29 DIAGNOSIS — G89.29 CHRONIC RIGHT-SIDED LOW BACK PAIN WITH RIGHT-SIDED SCIATICA: ICD-10-CM

## 2019-05-29 DIAGNOSIS — M54.41 CHRONIC RIGHT-SIDED LOW BACK PAIN WITH RIGHT-SIDED SCIATICA: ICD-10-CM

## 2019-05-29 PROCEDURE — 97110 THERAPEUTIC EXERCISES: CPT | Mod: GP

## 2019-05-30 DIAGNOSIS — Z98.890 STATUS POST LUMBAR SPINE SURGERY FOR DECOMPRESSION OF SPINAL CORD: ICD-10-CM

## 2019-05-30 RX ORDER — OXYCODONE HYDROCHLORIDE 5 MG/1
2.5-5 TABLET ORAL DAILY PRN
Qty: 10 TABLET | Refills: 0 | Status: SHIPPED | OUTPATIENT
Start: 2019-05-30 | End: 2022-02-23

## 2019-05-30 NOTE — TELEPHONE ENCOUNTER
Pt's  has communicated with Dr Ventura via email and Dr Ventura authorized refill for #10 oxycodone.  Rx renewed and placed in the locked box.  Dori Penaloza RN

## 2019-05-31 ENCOUNTER — THERAPY VISIT (OUTPATIENT)
Dept: PHYSICAL THERAPY | Facility: CLINIC | Age: 69
End: 2019-05-31
Payer: COMMERCIAL

## 2019-05-31 DIAGNOSIS — M54.41 CHRONIC RIGHT-SIDED LOW BACK PAIN WITH RIGHT-SIDED SCIATICA: ICD-10-CM

## 2019-05-31 DIAGNOSIS — Z98.1 S/P SPINAL FUSION: ICD-10-CM

## 2019-05-31 DIAGNOSIS — G89.29 CHRONIC RIGHT-SIDED LOW BACK PAIN WITH RIGHT-SIDED SCIATICA: ICD-10-CM

## 2019-05-31 PROCEDURE — 97530 THERAPEUTIC ACTIVITIES: CPT | Mod: GP | Performed by: PHYSICAL THERAPIST

## 2019-05-31 PROCEDURE — 97112 NEUROMUSCULAR REEDUCATION: CPT | Mod: GP | Performed by: PHYSICAL THERAPIST

## 2019-05-31 PROCEDURE — 97110 THERAPEUTIC EXERCISES: CPT | Mod: GP | Performed by: PHYSICAL THERAPIST

## 2019-06-08 NOTE — PROGRESS NOTES
"    Orthopaedic Surgery Daily Progress Note     Subjective: No acute events overnight. Pain control continues to improve. After discussion with Dr. Ventura, decision not to wear TLSO as it seemed to cause more discomfort than benefit. Otherwise anticipating discharge to TCU today.     Physical Exam   /64 (BP Location: Left arm)   Pulse 76   Temp 99.5  F (37.5  C)   Resp 18   Ht 1.473 m (4' 10\")   Wt 44.9 kg (98 lb 15.8 oz)   SpO2 98%   BMI 20.69 kg/m      Intake/Output Summary (Last 24 hours) at 4/4/2019 0638  Last data filed at 4/4/2019 0555  Gross per 24 hour   Intake 3177.92 ml   Output 2487 ml   Net 690.92 ml       Drain: drains out, drain sites c/d/i    General: Alert, well-appearing  in no acute distress.  Respiratory: Non-labored breathing.   Cardiovascular: Extremities warm and well perfused   Extremities: moving all four extremities.   Lumbar Spine:                                                   Motor -                           L2-3: Hip flexion 5/5 R and 5/5 L strength                           L3/4:  Knee extension R 5/5 and L 5/5 strength                          L4/5:  Foot dorsiflexion R 5/5 L 5/5 and                                       EHL dorsiflexion R 4/5 L 4/5 strength                          S1:  Plantarflexion/Peroneal Muscles  R 5/5 and L 5/5 strength                          Sensation: intact to light touch L3-S1 distribution BLE    Dressing CDI- removed    Incision with skin edges well approximated. No erythema or drainage.    Skin: As noted above. No rashes or lesions appreciated.    Labs    Complete Blood Count   Recent Labs   Lab 04/05/19  0931 04/04/19  1859 04/04/19  0859 04/03/19  2113  04/03/19  1350   WBC 5.8  --  7.6  --   --  8.1   HGB 8.0* 8.2* 8.6* 7.5*   < > 8.7*   *  --  173  --   --  209    < > = values in this interval not displayed.     Basic Metabolic Panel  Recent Labs   Lab 04/05/19  0931 04/04/19  0859 04/03/19  1910 04/03/19  1440 04/03/19  1350 "    131* 129* 129* 129*   POTASSIUM 3.5 4.3 3.3* 3.0* 2.8*   CHLORIDE 108 100 87*  --  87*   CO2 26 24 34*  --  34*   BUN 10 9 11  --  8   CR 0.60 0.70 0.86  --  0.86   GLC 87 89 128*  --  133*     Coagulation Profile  No lab results found in last 7 days.     Xrays completed: reviewed. L4-pelvis fixation in place. In good position.     Assessment/Plan:  Assessment and Plan:  Leeanne Duarte is a 68 year old female s/p Lumbar 3-4 Decompression, Lumbar 4-Sacral 1 Transforaminal Lumbar Interbody Fusion, Lumbar 5-Sacral 1 Kaminski Maxwell Osteotomy, Lumbar 4-Pelvis Instrumentation with illiac crest autograft on 4/3 with Dr. Ventura.     Orthopedic Surgery Primary  Activity: OK to mobilize without TLSO. Up with assist until independent. No excessive bending or twisting. No lifting >10 lbs x 6 weeks. No Hilaria lift for transfers.   Weight bearing status: WBAT.   Pain management: Transition from IV to PO as tolerated. No NSAIDs  Antibiotics: Ancef completed.   Diet: Begin with clear fluids and progress diet as tolerated.   DVT prophylaxis: SCDs only. No chemical DVT ppx needed.  Imaging: XR Upright lumbosacral spine PTDC - completed.  Labs: Hgb POD#1, 2 or until stable.  Bracing/Splinting: TLSO for patient comfort  Dressings: aquacel removed today; okay to leave open to air  Drains: Removed   [place] catheter: Removed   Physical Therapy/Occupational Therapy: Eval and treat.  Cultures: none  Consults: Hospitalist.  Follow-up: Follow up 1-2 weeks for drain suture removal. Then clinic with Dr. Ventura in 6 weeks with repeat x-rays.   Disposition: Anticipate discharge today      Francisca Uribe MD   Orthopaedic Surgery Resident, PGY-4  P: [phone]     Assessment  DMT2: 58y Male with DM T2 with hyperglycemia, on insulin, dose was adjusted, blood sugars trending down, no hypoglycemic episode, eating full meals, compliant with low carb diet.  Foot wound: s/p surgery, on medications, no chest pain, stable, monitored.  HTN: Controlled,  on antihypertensive medications.  Overweight/Obesity: No strict exercise routines, not on any weight loss program, neither on low calorie diet.    Andrzej Zhu MD  Cell: [phone]  Office: [phone]

## 2019-06-11 ENCOUNTER — TELEPHONE (OUTPATIENT)
Dept: ORTHOPEDICS | Facility: CLINIC | Age: 69
End: 2019-06-11

## 2019-06-11 NOTE — TELEPHONE ENCOUNTER
RECORDS RECEIVED FROM: right knee pain    DATE RECEIVED: Jun 14, 2019    NOTES STATUS DETAILS   OFFICE NOTE from referring provider N/A    OFFICE NOTE from other specialist N/A    DISCHARGE SUMMARY from hospital N/A    DISCHARGE REPORT from the ER N/A    OPERATIVE REPORT N/A    MEDICATION LIST Internal    IMPLANT RECORD/STICKER N/A    LABS     CBC/DIFF N/A    CULTURES N/A    INJECTIONS DONE IN RADIOLOGY N/A    MRI N/A    CT SCAN N/A    XRAYS (IMAGES & REPORTS) N/A    TUMOR     PATHOLOGY  Slides & report N/A

## 2019-06-11 NOTE — TELEPHONE ENCOUNTER
Leeanne has been referred to Dr Lai for right knee pain, possible steroid injection.  Appt has been made.'  Dori Penaloza RN

## 2019-06-12 ASSESSMENT — ENCOUNTER SYMPTOMS
INSOMNIA: 1
NECK PAIN: 1
MYALGIAS: 1
ARTHRALGIAS: 1
BACK PAIN: 1
STIFFNESS: 1

## 2019-06-14 ENCOUNTER — PRE VISIT (OUTPATIENT)
Dept: ORTHOPEDICS | Facility: CLINIC | Age: 69
End: 2019-06-14

## 2019-06-14 ENCOUNTER — RECORDS - HEALTHEAST (OUTPATIENT)
Dept: ADMINISTRATIVE | Facility: OTHER | Age: 69
End: 2019-06-14

## 2019-06-14 ENCOUNTER — ANCILLARY PROCEDURE (OUTPATIENT)
Dept: GENERAL RADIOLOGY | Facility: CLINIC | Age: 69
End: 2019-06-14
Attending: PREVENTIVE MEDICINE
Payer: COMMERCIAL

## 2019-06-14 ENCOUNTER — OFFICE VISIT (OUTPATIENT)
Dept: ORTHOPEDICS | Facility: CLINIC | Age: 69
End: 2019-06-14
Payer: COMMERCIAL

## 2019-06-14 DIAGNOSIS — M17.11 ARTHRITIS OF RIGHT KNEE: ICD-10-CM

## 2019-06-14 DIAGNOSIS — M17.11 ARTHRITIS OF RIGHT KNEE: Primary | ICD-10-CM

## 2019-06-14 NOTE — NURSING NOTE
Reason For Visit:   Chief Complaint   Patient presents with     RECHECK     Right knee pain       There were no vitals taken for this visit.    Pain Assessment  Patient Currently in Pain: Yes  0-10 Pain Scale: 4(Pain increases at night to 7-8)  Primary Pain Location: Knee(Right)    Liliya Briseno ATC

## 2019-06-14 NOTE — LETTER
6/14/2019       RE: Leeanne Duarte  [address]  [place] [address]     Dear Colleague,    Thank you for referring your patient, Leeanne Duarte, to the [facility]. Please see a copy of my visit note below.    HISTORY OF PRESENT ILLNESS  Ms. Duarte is a pleasant 69 year old year old female who presents to clinic today with right knee pain  Leeanne explains that she has a low back problem that she is going to see Dr Ventura for but her knee has bothered her more over the past few weeks  Location: right knee  Quality:  achy pain    Severity: 5/10 at worst    Duration: see above  Timing: occurs intermittently  Context: occurs while walking a lot  Modifying factors:  resting and non-use makes it better, movement and use makes it worse  Associated signs & symptoms: swelling at times    Additional history: as documented    MEDICAL HISTORY  Patient Active Problem List   Diagnosis     Inflammatory autoimmune disorder (H)     Raynaud's disease     Right-sided low back pain with right-sided sciatica     Primary insomnia     Thoracic compression fracture, sequela     Osteoporosis     Pain in thoracic spine     Chronic right-sided low back pain with right-sided sciatica     Other idiopathic scoliosis, unspecified spinal region     Degenerative scoliosis in adult patient     Anterolisthesis     Lumbar radiculopathy     Anxiety state     Encounter for long-term (current) use of high-risk medication     Fracture of eleventh thoracic vertebra (H)     Generalized anxiety disorder     Hyponatremia     Major depression, recurrent (H)     Tubular adenoma of colon     Vitamin D deficiency     Wedge compression fracture of unspecified thoracic vertebra, sequela     Status post lumbar spine surgery for decompression of spinal cord     S/P spinal fusion       Current Outpatient Medications   Medication Sig Dispense Refill     acetaminophen (TYLENOL) 325 MG  tablet Take 2 tablets (650 mg) by mouth every 4 hours as needed for other (For optimal non-opioid multimodal pain management to improve pain control and physical function.) 200 tablet 1     Ascorbic Acid (VITAMIN C) 500 MG CAPS Take  by mouth daily.       Calcium Citrate-Vitamin D (CALCIUM CITRATE + D) 300-100 MG-UNIT TABS Take by mouth 3 times daily Take 2 tablets in the AM and 2 tablets in the PM. Vit D 400 IU, Calcium 500 mg       denosumab (PROLIA) 60 MG/ML SOSY injection Inject 60 mg Subcutaneous       escitalopram (LEXAPRO) 10 MG tablet take 1 tablet every morning  3     FLUAD 0.5 ML TALI ADM 0.5ML IM UTD  0     gabapentin (NEURONTIN) 300 MG capsule Take 1 capsule (300 mg) by mouth 3 times daily Start by taking one at HS for 1 wk, then if tolerated take twice daily for one week, then 3/day 90 capsule 0     LORAZEPAM PO Take 0.5 mg by mouth 2 times daily as needed for anxiety       diazepam (VALIUM) 5 MG tablet Take 0.5-1 tablets (2.5-5 mg) by mouth every 6 hours as needed for muscle spasms (Patient not taking: Reported on 5/7/2019) 15 tablet 0     methylPREDNISolone (MEDROL DOSEPAK) 4 MG tablet therapy pack Take 2 tablets (8 mg) by mouth See Admin Instructions follow package directions (Patient not taking: Reported on 5/7/2019) 21 tablet 0     oxyCODONE (ROXICODONE) 5 MG tablet Take 0.5-1 tablets (2.5-5 mg) by mouth daily as needed for breakthrough pain (Patient not taking: Reported on 6/14/2019) 10 tablet 0     senna-docusate (SENOKOT-S/PERICOLACE) 8.6-50 MG tablet Take 2 tablets by mouth 2 times daily (Patient not taking: Reported on 5/7/2019) 50 tablet 0       Allergies   Allergen Reactions     Chocolate      Orange      Gabapentin      Confusion         Family History   Problem Relation Age of Onset     Colon Cancer Maternal Grandmother      Colon Cancer Maternal Grandfather      High cholesterol Mother      Hypertension Mother      Myocardial Infarction Mother 72     High cholesterol Father       Hypertension Father      Myocardial Infarction Father 76       Additional medical/Social/Surgical histories reviewed in [facility] and updated as appropriate.     REVIEW OF SYSTEMS (6/14/2019)  10 point ROS of systems including Constitutional, Eyes, Respiratory, Cardiovascular, Gastroenterology, Genitourinary, Integumentary, Musculoskeletal, Psychiatric were all negative except for pertinent positives noted in my HPI.     PHYSICAL EXAM  VSS  Vital Signs: There were no vitals taken for this visit. Patient declined being weighed. There is no height or weight on file to calculate BMI.    General  - normal appearance, in no obvious distress  CV  - normal popliteal pulse  Pulm  - normal respiratory pattern, non-labored  Musculoskeletal - right knee  - stance: non antalgic gait, genu varum  - inspection: generalized swelling, trace effusion  - palpation: medial joint line tenderness, patella and patellar tendon non-tender, normal popliteal pulse  - ROM: 100 degrees flexion, 0 degrees extension, painful active ROM  - strength: 5/5 in flexion, 5/5 in extension  - neuro: no sensory or motor deficit  - special tests:  (-) Lachman  (-) anterior drawer  (-) posterior drawer  (-) pivot shift  (-) Theodore  (-) Thessaly  (-) varus at 0 and 30 degrees flexion  (-) valgus at 0 and 30 degrees flexion  (-) Kun s compression test  (-) patellar apprehension  Neuro  - no sensory or motor deficit, grossly normal coordination, normal muscle tone  Skin  - no ecchymosis, erythema, warmth, or induration, no obvious rash  Psych  - interactive, appropriate, normal mood and affect    ASSESSMENT & PLAN  70 yo female with right knee arthritis  Given voltaren gel  Given HEP  Reviewed xrays: moderate arthritis  Consider injections  F/u in 1-2 months after making a plan for lumbar spine    Minh Lai MD, CAQSM

## 2019-06-14 NOTE — PROGRESS NOTES
HISTORY OF PRESENT ILLNESS  Ms. Duarte is a pleasant 69 year old year old female who presents to clinic today with right knee pain  Leeanne explains that she has a low back problem that she is going to see Dr Ventura for but her knee has bothered her more over the past few weeks  Location: right knee  Quality:  achy pain    Severity: 5/10 at worst    Duration: see above  Timing: occurs intermittently  Context: occurs while walking a lot  Modifying factors:  resting and non-use makes it better, movement and use makes it worse  Associated signs & symptoms: swelling at times    Additional history: as documented    MEDICAL HISTORY  Patient Active Problem List   Diagnosis     Inflammatory autoimmune disorder (H)     Raynaud's disease     Right-sided low back pain with right-sided sciatica     Primary insomnia     Thoracic compression fracture, sequela     Osteoporosis     Pain in thoracic spine     Chronic right-sided low back pain with right-sided sciatica     Other idiopathic scoliosis, unspecified spinal region     Degenerative scoliosis in adult patient     Anterolisthesis     Lumbar radiculopathy     Anxiety state     Encounter for long-term (current) use of high-risk medication     Fracture of eleventh thoracic vertebra (H)     Generalized anxiety disorder     Hyponatremia     Major depression, recurrent (H)     Tubular adenoma of colon     Vitamin D deficiency     Wedge compression fracture of unspecified thoracic vertebra, sequela     Status post lumbar spine surgery for decompression of spinal cord     S/P spinal fusion       Current Outpatient Medications   Medication Sig Dispense Refill     acetaminophen (TYLENOL) 325 MG tablet Take 2 tablets (650 mg) by mouth every 4 hours as needed for other (For optimal non-opioid multimodal pain management to improve pain control and physical function.) 200 tablet 1     Ascorbic Acid (VITAMIN C) 500 MG CAPS Take  by mouth daily.       Calcium Citrate-Vitamin D (CALCIUM  CITRATE + D) 300-100 MG-UNIT TABS Take by mouth 3 times daily Take 2 tablets in the AM and 2 tablets in the PM. Vit D 400 IU, Calcium 500 mg       denosumab (PROLIA) 60 MG/ML SOSY injection Inject 60 mg Subcutaneous       escitalopram (LEXAPRO) 10 MG tablet take 1 tablet every morning  3     FLUAD 0.5 ML TALI ADM 0.5ML IM UTD  0     gabapentin (NEURONTIN) 300 MG capsule Take 1 capsule (300 mg) by mouth 3 times daily Start by taking one at HS for 1 wk, then if tolerated take twice daily for one week, then 3/day 90 capsule 0     LORAZEPAM PO Take 0.5 mg by mouth 2 times daily as needed for anxiety       diazepam (VALIUM) 5 MG tablet Take 0.5-1 tablets (2.5-5 mg) by mouth every 6 hours as needed for muscle spasms (Patient not taking: Reported on 5/7/2019) 15 tablet 0     methylPREDNISolone (MEDROL DOSEPAK) 4 MG tablet therapy pack Take 2 tablets (8 mg) by mouth See Admin Instructions follow package directions (Patient not taking: Reported on 5/7/2019) 21 tablet 0     oxyCODONE (ROXICODONE) 5 MG tablet Take 0.5-1 tablets (2.5-5 mg) by mouth daily as needed for breakthrough pain (Patient not taking: Reported on 6/14/2019) 10 tablet 0     senna-docusate (SENOKOT-S/PERICOLACE) 8.6-50 MG tablet Take 2 tablets by mouth 2 times daily (Patient not taking: Reported on 5/7/2019) 50 tablet 0       Allergies   Allergen Reactions     Chocolate      Orange      Gabapentin      Confusion         Family History   Problem Relation Age of Onset     Colon Cancer Maternal Grandmother      Colon Cancer Maternal Grandfather      High cholesterol Mother      Hypertension Mother      Myocardial Infarction Mother 72     High cholesterol Father      Hypertension Father      Myocardial Infarction Father 76       Additional medical/Social/Surgical histories reviewed in [facility] and updated as appropriate.     REVIEW OF SYSTEMS (6/14/2019)  10 point ROS of systems including Constitutional, Eyes, Respiratory, Cardiovascular, Gastroenterology,  Genitourinary, Integumentary, Musculoskeletal, Psychiatric were all negative except for pertinent positives noted in my HPI.     PHYSICAL EXAM  VSS  Vital Signs: There were no vitals taken for this visit. Patient declined being weighed. There is no height or weight on file to calculate BMI.    General  - normal appearance, in no obvious distress  CV  - normal popliteal pulse  Pulm  - normal respiratory pattern, non-labored  Musculoskeletal - right knee  - stance: non antalgic gait, genu varum  - inspection: generalized swelling, trace effusion  - palpation: medial joint line tenderness, patella and patellar tendon non-tender, normal popliteal pulse  - ROM: 100 degrees flexion, 0 degrees extension, painful active ROM  - strength: 5/5 in flexion, 5/5 in extension  - neuro: no sensory or motor deficit  - special tests:  (-) Lachman  (-) anterior drawer  (-) posterior drawer  (-) pivot shift  (-) Theodore  (-) Thessaly  (-) varus at 0 and 30 degrees flexion  (-) valgus at 0 and 30 degrees flexion  (-) Kun s compression test  (-) patellar apprehension  Neuro  - no sensory or motor deficit, grossly normal coordination, normal muscle tone  Skin  - no ecchymosis, erythema, warmth, or induration, no obvious rash  Psych  - interactive, appropriate, normal mood and affect    ASSESSMENT & PLAN  68 yo female with right knee arthritis  Given voltaren gel  Given HEP  Reviewed xrays: moderate arthritis  Consider injections  F/u in 1-2 months after making a plan for lumbar spine      Minh Lai MD, CAM

## 2019-06-28 ENCOUNTER — TELEPHONE (OUTPATIENT)
Dept: ORTHOPEDICS | Facility: CLINIC | Age: 69
End: 2019-06-28

## 2019-06-28 NOTE — TELEPHONE ENCOUNTER
Email was received below, and appt was arranged for Dr Ventura to see her next week.  Dori Mason <[email or web address]>  Today, 9:59 AM  Lane Ventura [email or web address]>;Dori Penaloza    Hi Lui and [place] seems to have stalled in her recovery. I recall she canceled an appt with you a couple weeks ago (something else came up) but she needs to return, likely for a steroid injection in her back.     Would you be so kind as to advise on next step(s)?    Thank you as always,    TWL    Ole Mason, PhD

## 2019-07-01 DIAGNOSIS — M54.50 LUMBAR PAIN: Primary | ICD-10-CM

## 2019-07-02 ENCOUNTER — RECORDS - HEALTHEAST (OUTPATIENT)
Dept: ADMINISTRATIVE | Facility: OTHER | Age: 69
End: 2019-07-02

## 2019-07-02 ENCOUNTER — OFFICE VISIT (OUTPATIENT)
Dept: ORTHOPEDICS | Facility: CLINIC | Age: 69
End: 2019-07-02
Payer: COMMERCIAL

## 2019-07-02 ENCOUNTER — ANCILLARY PROCEDURE (OUTPATIENT)
Dept: CT IMAGING | Facility: CLINIC | Age: 69
End: 2019-07-02
Attending: ORTHOPAEDIC SURGERY
Payer: COMMERCIAL

## 2019-07-02 ENCOUNTER — ANCILLARY PROCEDURE (OUTPATIENT)
Dept: GENERAL RADIOLOGY | Facility: CLINIC | Age: 69
End: 2019-07-02
Attending: ORTHOPAEDIC SURGERY
Payer: COMMERCIAL

## 2019-07-02 VITALS — WEIGHT: 98 LBS | HEIGHT: 58 IN | BODY MASS INDEX: 20.57 KG/M2

## 2019-07-02 DIAGNOSIS — M41.50 DEGENERATIVE SCOLIOSIS IN ADULT PATIENT: ICD-10-CM

## 2019-07-02 DIAGNOSIS — M41.50 DEGENERATIVE SCOLIOSIS IN ADULT PATIENT: Primary | ICD-10-CM

## 2019-07-02 ASSESSMENT — ENCOUNTER SYMPTOMS
MUSCLE WEAKNESS: 1
VOMITING: 0
BOWEL INCONTINENCE: 1
PANIC: 0
TROUBLE SWALLOWING: 0
DIARRHEA: 0
ALTERED TEMPERATURE REGULATION: 0
SINUS CONGESTION: 0
BLOOD IN STOOL: 0
DECREASED APPETITE: 0
POLYDIPSIA: 0
NECK MASS: 0
SINUS PAIN: 0
RECTAL PAIN: 0
CHILLS: 0
JOINT SWELLING: 0
HEARTBURN: 0
FEVER: 0
WEIGHT LOSS: 0
DECREASED CONCENTRATION: 1
DEPRESSION: 1
SMELL DISTURBANCE: 0
BLOATING: 0
ARTHRALGIAS: 1
HALLUCINATIONS: 0
ABDOMINAL PAIN: 0
INSOMNIA: 1
NAUSEA: 0
POLYPHAGIA: 0
MUSCLE CRAMPS: 0
CONSTIPATION: 0
JAUNDICE: 0
HOARSE VOICE: 0
NIGHT SWEATS: 0
BACK PAIN: 1
INCREASED ENERGY: 1
NERVOUS/ANXIOUS: 1
TASTE DISTURBANCE: 0
MYALGIAS: 1
WEIGHT GAIN: 0
NECK PAIN: 1
SORE THROAT: 0

## 2019-07-02 ASSESSMENT — MIFFLIN-ST. JEOR: SCORE: 854.28

## 2019-07-02 NOTE — PROGRESS NOTES
Spine Surgery Return Clinic Visit      Chief Complaint:   RECHECK (continued pain in low back DOS 4/3/19)      Interval HPI:  Symptom Profile Including: location of symptoms, onset, severity, exacerbating/alleviating factors, previous treatments:        Leeanne Duarte is a 69 year old female who returns now nearly 3 months status post L4 to the pelvis fusion with an L3-4 decompression.    Preoperative leg symptoms are actually much better than they were before surgery.  She had constant numbness and pain before the operation and that is improved for her.  Nonetheless she still dealing with some buttock pain and pain that seems to radiate down the lateral aspect of the leg towards the knee and then the medial aspect of her leg somewhat in an L4 distribution.  She feels like the symptoms of been relatively stable over the last 3 to 4 weeks.  She did separately see my partner Dr. Lai for consideration of possible knee arthritis and x-rays were obtained which were also be mild and she was started on some physical therapy.    On interview today she is actually little bit tearful concerned about her back pain symptoms.  She is hoping should be further along at this point.  This is a big change for her because that each of her previous visit she has been quite optimistic.  So much so in fact that her  actually sent a number of letters to the administration here at the [facility] stating how happy they were with her progress.  So this is definitely a big change for her today.            Past Medical History:   No past medical history on file.         Past Surgical History:     Past Surgical History:   Procedure Laterality Date     CATARACT IOL, RT/LT  2014     HARVEST BONE MARROW FROM ILIAC CREST Right 4/3/2019    Procedure: [place] Bone Marrow From Iliac Crest;  Surgeon: Lane Ventura MD;  Location:  OR     [employer] SYSTEM FUSION SPINE POSTERIOR LUMBAR THREE+ LEVELS N/A 4/3/2019     "Procedure: Lumbar 3-4 Decompression, Lumbar 4-Sacral 1 Transforaminal Lumbar Interbody Fusion, Lumbar 5-Sacral 1 Kaminski Maxwell Osteotomy, Lumbar 4-Pelvis Instrumentation, Illiac Crest Autograft;  Surgeon: Lane Ventura MD;  Location:  OR            Social History:     Social History     Tobacco Use     Smoking status: Never Smoker     Smokeless tobacco: Never Used   Substance Use Topics     Alcohol use: Yes     Comment: 7 per week            Family History:     Family History   Problem Relation Age of Onset     Colon Cancer Maternal Grandmother      Colon Cancer Maternal Grandfather      High cholesterol Mother      Hypertension Mother      Myocardial Infarction Mother 72     High cholesterol Father      Hypertension Father      Myocardial Infarction Father 76            Allergies:     Allergies   Allergen Reactions     Chocolate      Orange      Gabapentin      Confusion              Medications:     Current Outpatient Medications   Medication     acetaminophen (TYLENOL) 325 MG tablet     Ascorbic Acid (VITAMIN C) 500 MG CAPS     Calcium Citrate-Vitamin D (CALCIUM CITRATE + D) 300-100 MG-UNIT TABS     denosumab (PROLIA) 60 MG/ML SOSY injection     diazepam (VALIUM) 5 MG tablet     diclofenac (VOLTAREN) 1 % topical gel     escitalopram (LEXAPRO) 10 MG tablet     FLUAD 0.5 ML TALI     gabapentin (NEURONTIN) 300 MG capsule     LORAZEPAM PO     methylPREDNISolone (MEDROL DOSEPAK) 4 MG tablet therapy pack     oxyCODONE (ROXICODONE) 5 MG tablet     senna-docusate (SENOKOT-S/PERICOLACE) 8.6-50 MG tablet     No current facility-administered medications for this visit.              Review of Systems:   A focused musculoskeletal and neurologic ROS was performed with pertinent positives and negatives noted in the HPI.  Additional systems were also reviewed and are documented at the bottom of the note.         Physical Exam:   Vitals: Ht 1.473 m (4' 10\")   Wt 44.5 kg (98 lb)   BMI 20.48 kg/m  "   Musculoskeletal, Neurologic, and Spine:          Lumbar Spine:    Appearance - No gross stepoffs or deformities    Motor -     L2-3: Hip flexion 5/5 R and 5/5 L strength          L3/4:  Knee extension R 5/5 and L 5/5 strength         L4/5:  Foot dorsiflexion R 5/5 L 5/5 and       EHL dorsiflexion R 5/5 L 5/5 strength         S1:  Plantarflexion/Peroneal Muscles  R 5/5 and L 5/5 strength    Sensation: intact to light touch L3-S1 distribution BLE, parasthesias right L4      Neurologic:      REFLEXES Left Right                  Patella 1+ 1+   Ankle jerk 1+ 1+   Babinski No upgoing great toe No upgoing great toe   Clonus 0 beats 0 beats     Hip Exam:  No pain with hip log roll and no tenderness over the greater trochanters.    Alignment:  Patient stands with a neutral standing sagittal balance.           Imaging:   We ordered and independently reviewed new radiographs at this clinic visit. The results were discussed with the patient. Findings include:     July 2, 2019 AP and lateral lumbar radiographs show unchanged alignment compared to the May 7, 2019 lumbar imaging.    Lumbar CT scan July 2, 2019 shows instrumentation in place with incomplete fusion, with healing consistent with her being only 2-1/2 months out from surgery.  No evidence of fracture or implant failure.     Assessment and Plan:     69 year old female with right L4 pattern radicular complaints and back pain of uncertain etiology that seems to have worsened over the last few weeks.  I like her to try a right L4 selective nerve root injection and I am going to get this set up with our imaging team.  In addition we are going to try Medrol Dosepak and she is going to continue with the gabapentin.  I tried to provide reassurance today that I do not see anything major on her scans.  I would like to follow-up with her in a couple of weeks to assess her progress after the injection.           Respectfully,  Lane Ventura MD  Spine  Surgery  [facility]    Answers for HPI/ROS submitted by the patient on 7/2/2019   General Symptoms: Yes  Skin Symptoms: No  HENT Symptoms: Yes  EYE SYMPTOMS: No  HEART SYMPTOMS: No  LUNG SYMPTOMS: No  INTESTINAL SYMPTOMS: Yes  URINARY SYMPTOMS: No  GYNECOLOGIC SYMPTOMS: No  BREAST SYMPTOMS: No  SKELETAL SYMPTOMS: Yes  BLOOD SYMPTOMS: No  NERVOUS SYSTEM SYMPTOMS: No  MENTAL HEALTH SYMPTOMS: Yes  Fever: No  Loss of appetite: No  Weight loss: No  Weight gain: No  Night sweats: No  Chills: No  Increased stress: Yes  Excessive hunger: No  Excessive thirst: No  Feeling hot or cold when others believe the temperature is normal: No  Loss of height: No  Post-operative complications: No  Surgical site pain: Yes  Hallucinations: No  Change in or Loss of Energy: Yes  Hyperactivity: No  Confusion: No  Ear pain: No  Ear discharge: No  Hearing loss: No  Tinnitus: No  Nosebleeds: Yes  Congestion: No  Sinus pain: No  Trouble swallowing: No   Voice hoarseness: No  Mouth sores: No  Sore throat: No  Tooth pain: No  Gum tenderness: No  Bleeding gums: No  Change in taste: No  Change in sense of smell: No  Dry mouth: No  Hearing aid used: No  Neck lump: No  Heart burn or indigestion: No  Nausea: No  Vomiting: No  Abdominal pain: No  Bloating: No  Constipation: No  Diarrhea: No  Blood in stool: No  Black stools: No  Rectal or Anal pain: No  Fecal incontinence: Yes  Yellowing of skin or eyes: No  Vomit with blood: No  Change in stools: No  Back pain: Yes  Muscle aches: Yes  Neck pain: Yes  Swollen joints: No  Joint pain: Yes  Bone pain: Yes  Muscle cramps: No  Muscle weakness: Yes  Bone fracture: No  Nervous or Anxious: Yes  Depression: Yes  Trouble sleeping: Yes  Trouble thinking or concentrating: Yes  Mood changes: Yes  Panic attacks: No

## 2019-07-02 NOTE — NURSING NOTE
"Reason For Visit:   Chief Complaint   Patient presents with     RECHECK     continued pain in low back DOS 4/3/19       Primary MD: Renée Horne  Ref. MD: Self  Date of surgery: 4/3/19  Type of surgery: Dr. Ventura .  Smoker: No  Request smoking cessation information: No    Ht 1.473 m (4' 10\")   Wt 44.5 kg (98 lb)   BMI 20.48 kg/m      Pain Assessment  Patient Currently in Pain: Yes  0-10 Pain Scale: 5    Oswestry (BEENA) Questionnaire    OSWESTRY DISABILITY INDEX 7/2/2019   Count 9   Sum 22   Oswestry Score (%) 48.89   Some recent data might be hidden            Neck Disability Index (NDI) Questionnaire    No flowsheet data found.                Promis 10 Assessment    PROMIS 10 7/2/2019   In general, would you say your health is: Fair   In general, would you say your quality of life is: Fair   In general, how would you rate your physical health? Fair   In general, how would you rate your mental health, including your mood and your ability to think? Fair   In general, how would you rate your satisfaction with your social activities and relationships? Poor   In general, please rate how well you carry out your usual social activities and roles Fair   To what extent are you able to carry out your everyday physical activities such as walking, climbing stairs, carrying groceries, or moving a chair? Moderately   How often have you been bothered by emotional problems such as feeling anxious, depressed or irritable? Often   How would you rate your fatigue on average? Moderate   How would you rate your pain on average?   0 = No Pain  to  10 = Worst Imaginable Pain 5   Global Physical Health Score : Raw Score -   Global Mental Health Score : Raw Score -   Total (Physical + Mental Health Score) -   In general, would you say your health is: 2   In general, would you say your quality of life is: 2   In general, how would you rate your physical health? 2   In general, how would you rate your mental health, including " your mood and your ability to think? 2   In general, how would you rate your satisfaction with your social activities and relationships? 1   In general, please rate how well you carry out your usual social activities and roles. (This includes activities at home, at work and in your community, and responsibilities as a parent, child, spouse, employee, friend, etc.) 2   To what extent are you able to carry out your everyday physical activities such as walking, climbing stairs, carrying groceries, or moving a chair? 3   In the past 7 days, how often have you been bothered by emotional problems such as feeling anxious, depressed, or irritable? 4   In the past 7 days, how would you rate your fatigue on average? 3   In the past 7 days, how would you rate your pain on average, where 0 means no pain, and 10 means worst imaginable pain? 5   Global Mental Health Score 7   Global Physical Health Score 11   PROMIS TOTAL - SUBSCORES 18   Some recent data might be hidden                Randell Esteves ATC

## 2019-07-02 NOTE — LETTER
7/2/2019       RE: Leeanne Duarte  [address]  [place] [address]     Dear Colleague,    Thank you for referring your patient, Leeanne Duarte, to the [facility] at [facility]. Please see a copy of my visit note below.    Spine Surgery Return Clinic Visit      Chief Complaint:   RECHECK (continued pain in low back DOS 4/3/19)      Interval HPI:  Symptom Profile Including: location of symptoms, onset, severity, exacerbating/alleviating factors, previous treatments:        Leeanne Duarte is a 69 year old female who returns now nearly 3 months status post L4 to the pelvis fusion with an L3-4 decompression.    Preoperative leg symptoms are actually much better than they were before surgery.  She had constant numbness and pain before the operation and that is improved for her.  Nonetheless she still dealing with some buttock pain and pain that seems to radiate down the lateral aspect of the leg towards the knee and then the medial aspect of her leg somewhat in an L4 distribution.  She feels like the symptoms of been relatively stable over the last 3 to 4 weeks.  She did separately see my partner Dr. Lai for consideration of possible knee arthritis and x-rays were obtained which were also be mild and she was started on some physical therapy.    On interview today she is actually little bit tearful concerned about her back pain symptoms.  She is hoping should be further along at this point.  This is a big change for her because that each of her previous visit she has been quite optimistic.  So much so in fact that her  actually sent a number of letters to the administration here at the [facility] stating how happy they were with her progress.  So this is definitely a big change for her today.         Past Medical History:   No past medical history on file.         Past Surgical History:     Past Surgical History:   Procedure  Laterality Date     CATARACT IOL, RT/LT  2014     HARVEST BONE MARROW FROM ILIAC CREST Right 4/3/2019    Procedure: [place] Bone Marrow From Iliac Crest;  Surgeon: Lane Ventura MD;  [facility]     OPTICAL TRACKING SYSTEM FUSION SPINE POSTERIOR LUMBAR THREE+ LEVELS N/A 4/3/2019    Procedure: Lumbar 3-4 Decompression, Lumbar 4-Sacral 1 Transforaminal Lumbar Interbody Fusion, Lumbar 5-Sacral 1 Kaminski Maxwell Osteotomy, Lumbar 4-Pelvis Instrumentation, Illiac Crest Autograft;  Surgeon: Lane Ventura MD;  [facility] OR            Social History:     Social History     Tobacco Use     Smoking status: Never Smoker     Smokeless tobacco: Never Used   Substance Use Topics     Alcohol use: Yes     Comment: 7 per week            Family History:     Family History   Problem Relation Age of Onset     Colon Cancer Maternal Grandmother      Colon Cancer Maternal Grandfather      High cholesterol Mother      Hypertension Mother      Myocardial Infarction Mother 72     High cholesterol Father      Hypertension Father      Myocardial Infarction Father 76            Allergies:     Allergies   Allergen Reactions     Chocolate      Orange      Gabapentin      Confusion              Medications:     Current Outpatient Medications   Medication     acetaminophen (TYLENOL) 325 MG tablet     Ascorbic Acid (VITAMIN C) 500 MG CAPS     Calcium Citrate-Vitamin D (CALCIUM CITRATE + D) 300-100 MG-UNIT TABS     denosumab (PROLIA) 60 MG/ML SOSY injection     diazepam (VALIUM) 5 MG tablet     diclofenac (VOLTAREN) 1 % topical gel     escitalopram (LEXAPRO) 10 MG tablet     FLUAD 0.5 ML TALI     gabapentin (NEURONTIN) 300 MG capsule     LORAZEPAM PO     methylPREDNISolone (MEDROL DOSEPAK) 4 MG tablet therapy pack     oxyCODONE (ROXICODONE) 5 MG tablet     senna-docusate (SENOKOT-S/PERICOLACE) 8.6-50 MG tablet     No current facility-administered medications for this visit.              Review of Systems:   A focused  "musculoskeletal and neurologic ROS was performed with pertinent positives and negatives noted in the HPI.  Additional systems were also reviewed and are documented at the bottom of the note.         Physical Exam:   Vitals: Ht 1.473 m (4' 10\")   Wt 44.5 kg (98 lb)   BMI 20.48 kg/m     Musculoskeletal, Neurologic, and Spine:          Lumbar Spine:    Appearance - No gross stepoffs or deformities    Motor -     L2-3: Hip flexion 5/5 R and 5/5 L strength          L3/4:  Knee extension R 5/5 and L 5/5 strength         L4/5:  Foot dorsiflexion R 5/5 L 5/5 and       EHL dorsiflexion R 5/5 L 5/5 strength         S1:  Plantarflexion/Peroneal Muscles  R 5/5 and L 5/5 strength    Sensation: intact to light touch L3-S1 distribution BLE, parasthesias right L4      Neurologic:      REFLEXES Left Right                  Patella 1+ 1+   Ankle jerk 1+ 1+   Babinski No upgoing great toe No upgoing great toe   Clonus 0 beats 0 beats     Hip Exam:  No pain with hip log roll and no tenderness over the greater trochanters.    Alignment:  Patient stands with a neutral standing sagittal balance.         Imaging:   We ordered and independently reviewed new radiographs at this clinic visit. The results were discussed with the patient. Findings include:     July 2, 2019 AP and lateral lumbar radiographs show unchanged alignment compared to the May 7, 2019 lumbar imaging.    Lumbar CT scan July 2, 2019 shows instrumentation in place with incomplete fusion, with healing consistent with her being only 2-1/2 months out from surgery.  No evidence of fracture or implant failure.     Assessment and Plan:     69 year old female with right L4 pattern radicular complaints and back pain of uncertain etiology that seems to have worsened over the last few weeks.  I like her to try a right L4 selective nerve root injection and I am going to get this set up with our imaging team.  In addition we are going to try Medrol Dosepak and she is going to continue " with the gabapentin.  I tried to provide reassurance today that I do not see anything major on her scans.  I would like to follow-up with her in a couple of weeks to assess her progress after the injection.     Respectfully,  Lane Ventura MD  Spine Surgery  [facility]

## 2019-07-08 ENCOUNTER — DOCUMENTATION ONLY (OUTPATIENT)
Dept: CARE COORDINATION | Facility: CLINIC | Age: 69
End: 2019-07-08

## 2019-07-11 ENCOUNTER — TRANSFERRED RECORDS (OUTPATIENT)
Dept: HEALTH INFORMATION MANAGEMENT | Facility: CLINIC | Age: 69
End: 2019-07-11

## 2019-07-16 ENCOUNTER — OFFICE VISIT (OUTPATIENT)
Dept: ORTHOPEDICS | Facility: CLINIC | Age: 69
End: 2019-07-16
Payer: COMMERCIAL

## 2019-07-16 ENCOUNTER — RECORDS - HEALTHEAST (OUTPATIENT)
Dept: ADMINISTRATIVE | Facility: OTHER | Age: 69
End: 2019-07-16

## 2019-07-16 VITALS — WEIGHT: 98 LBS | HEIGHT: 58 IN | BODY MASS INDEX: 20.57 KG/M2

## 2019-07-16 DIAGNOSIS — M43.10 ACQUIRED SPONDYLOLISTHESIS: ICD-10-CM

## 2019-07-16 DIAGNOSIS — M41.50 DEGENERATIVE SCOLIOSIS IN ADULT PATIENT: Primary | ICD-10-CM

## 2019-07-16 ASSESSMENT — ENCOUNTER SYMPTOMS
MUSCLE CRAMPS: 0
FLANK PAIN: 0
DYSURIA: 0
MUSCLE WEAKNESS: 0
ARTHRALGIAS: 0
HEMATURIA: 0
STIFFNESS: 0
BACK PAIN: 1
MYALGIAS: 0
NECK PAIN: 0
JOINT SWELLING: 0

## 2019-07-16 ASSESSMENT — MIFFLIN-ST. JEOR: SCORE: 854.28

## 2019-07-16 ASSESSMENT — PATIENT HEALTH QUESTIONNAIRE - PHQ9
SUM OF ALL RESPONSES TO PHQ QUESTIONS 1-9: 1
SUM OF ALL RESPONSES TO PHQ QUESTIONS 1-9: 1

## 2019-07-16 NOTE — LETTER
7/16/2019       RE: Leeanne Duarte  [address]  [place] [address]     Dear Colleague,    Thank you for referring your patient, Leeanne Duarte, to the [facility] at [facility]. Please see a copy of my visit note below.    Spine Surgery Return Clinic Visit      Chief Complaint:   RECHECK (follow up after CT and injection )      Interval HPI:  Symptom Profile Including: location of symptoms, onset, severity, exacerbating/alleviating factors, previous treatments:        Leeanne Duarte is a 69 year old female who returns now nearly 3 months status post L4 to the pelvis fusion with an L3-4 decompression.     Preoperative leg symptoms are actually much better than they were before surgery.  She had constant numbness and pain before the operation and that is improved for her.  Nonetheless she still dealing with some buttock pain and pain that seems to radiate down the lateral aspect of the leg towards the knee and then the medial aspect of her leg somewhat in an L4 distribution.  She feels like the symptoms of been relatively stable over the last 3 to 4 weeks.  She did separately see my partner Dr. Lai for consideration of possible knee arthritis and x-rays were obtained which were thought to be mild and she was started on some physical therapy.     After her last visit with me in early July, I sent her for a right L4 selective nerve root injection.  She returns today that they absolutely hit the spot.  She says today she is feeling like at a 10 out of 10 new person with 10 being the most positive so she is very happy with her situation.  She says she has no leg symptoms currently.  So this is a dramatic improvement from just a couple weeks ago.            Past Medical History:   No past medical history on file.         Past Surgical History:     Past Surgical History:   Procedure Laterality Date     CATARACT IOL, RT/LT  2014     HARVEST  BONE MARROW FROM ILIAC CREST Right 4/3/2019    Procedure: [place] Bone Marrow From Iliac Crest;  Surgeon: Lane Ventura MD;  [facility]     OPTICAL TRACKING SYSTEM FUSION SPINE POSTERIOR LUMBAR THREE+ LEVELS N/A 4/3/2019    Procedure: Lumbar 3-4 Decompression, Lumbar 4-Sacral 1 Transforaminal Lumbar Interbody Fusion, Lumbar 5-Sacral 1 Kaminski Maxwell Osteotomy, Lumbar 4-Pelvis Instrumentation, Illiac Crest Autograft;  Surgeon: Lane Ventura MD;  [facility]            Social History:     Social History     Tobacco Use     Smoking status: Never Smoker     Smokeless tobacco: Never Used   Substance Use Topics     Alcohol use: Yes     Comment: 7 per week            Family History:     Family History   Problem Relation Age of Onset     Colon Cancer Maternal Grandmother      Colon Cancer Maternal Grandfather      High cholesterol Mother      Hypertension Mother      Myocardial Infarction Mother 72     High cholesterol Father      Hypertension Father      Myocardial Infarction Father 76            Allergies:     Allergies   Allergen Reactions     Chocolate      Orange      Gabapentin      Confusion              Medications:     Current Outpatient Medications   Medication     acetaminophen (TYLENOL) 325 MG tablet     Ascorbic Acid (VITAMIN C) 500 MG CAPS     Calcium Citrate-Vitamin D (CALCIUM CITRATE + D) 300-100 MG-UNIT TABS     denosumab (PROLIA) 60 MG/ML SOSY injection     diazepam (VALIUM) 5 MG tablet     diclofenac (VOLTAREN) 1 % topical gel     escitalopram (LEXAPRO) 10 MG tablet     FLUAD 0.5 ML TALI     gabapentin (NEURONTIN) 300 MG capsule     LORAZEPAM PO     methylPREDNISolone (MEDROL DOSEPAK) 4 MG tablet therapy pack     oxyCODONE (ROXICODONE) 5 MG tablet     senna-docusate (SENOKOT-S/PERICOLACE) 8.6-50 MG tablet     No current facility-administered medications for this visit.              Review of Systems:   A focused musculoskeletal and neurologic ROS was performed with  "pertinent positives and negatives noted in the HPI.  Additional systems were also reviewed and are documented at the bottom of the note.         Physical Exam:   Vitals: Ht 1.473 m (4' 10\")   Wt 44.5 kg (98 lb)   BMI 20.48 kg/m     Musculoskeletal, Neurologic, and Spine:          Lumbar Spine:    Appearance - No gross stepoffs or deformities    Motor -     L2-3: Hip flexion 5/5 R and 5/5 L strength          L3/4:  Knee extension R 5/5 and L 5/5 strength         L4/5:  Foot dorsiflexion R 5/5 L 5/5 and       EHL dorsiflexion R 5/5 L 5/5 strength         S1:  Plantarflexion/Peroneal Muscles  R 5/5 and L 5/5 strength    Sensation: intact to light touch L3-S1 distribution BLE      Neurologic:      REFLEXES Left Right                  Patella 1+ 1+   Ankle jerk 1+ 1+   Babinski No upgoing great toe No upgoing great toe   Clonus 0 beats 0 beats     Hip Exam:  No pain with hip log roll and no tenderness over the greater trochanters.    Alignment:  Patient stands with a neutral standing sagittal balance.           Imaging:   We ordered and independently reviewed new radiographs at this clinic visit. The results were discussed with the patient. Findings include:     I reviewed her July 2 lumbar CT scan which shows instrumentation in place with no evidence of hardware failure and no severe stenosis.       Assessment and Plan:     69 year old female with good clinical progress at this point.  I told her I am very pleased that the injection has helped her.  I told her if the right leg symptoms return we could order a new right L4 selective nerve root block over the phone and she will call us if she needs this done.  Otherwise I think she can start some stationary bicycle.  I would still avoid heavy lifting over about 25 or 30 pounds and try to avoid repetitive bending or twisting.  Follow-up with me in 6 months with AP and Lateral Lumbar Films at that time.    Respectfully,  Lane Ventura MD  Spine " Surgery  [facility]

## 2019-07-16 NOTE — NURSING NOTE
"Reason For Visit:   Chief Complaint   Patient presents with     RECHECK     follow up after CT and injection        Primary MD: Renée Horne  Ref. MD: Self  Date of surgery: none   Type of surgery: none .  Smoker: No  Request smoking cessation information: No    Ht 1.473 m (4' 10\")   Wt 44.5 kg (98 lb)   BMI 20.48 kg/m           Oswestry (BEENA) Questionnaire    OSWESTRY DISABILITY INDEX 7/2/2019   Count 9   Sum 22   Oswestry Score (%) 48.89   Some recent data might be hidden            Neck Disability Index (NDI) Questionnaire    No flowsheet data found.                Promis 10 Assessment    PROMIS 10 7/2/2019   In general, would you say your health is: Fair   In general, would you say your quality of life is: Fair   In general, how would you rate your physical health? Fair   In general, how would you rate your mental health, including your mood and your ability to think? Fair   In general, how would you rate your satisfaction with your social activities and relationships? Poor   In general, please rate how well you carry out your usual social activities and roles Fair   To what extent are you able to carry out your everyday physical activities such as walking, climbing stairs, carrying groceries, or moving a chair? Moderately   How often have you been bothered by emotional problems such as feeling anxious, depressed or irritable? Often   How would you rate your fatigue on average? Moderate   How would you rate your pain on average?   0 = No Pain  to  10 = Worst Imaginable Pain 5   Global Physical Health Score : Raw Score -   Global Mental Health Score : Raw Score -   Total (Physical + Mental Health Score) -   In general, would you say your health is: 2   In general, would you say your quality of life is: 2   In general, how would you rate your physical health? 2   In general, how would you rate your mental health, including your mood and your ability to think? 2   In general, how would you rate your " satisfaction with your social activities and relationships? 1   In general, please rate how well you carry out your usual social activities and roles. (This includes activities at home, at work and in your community, and responsibilities as a parent, child, spouse, employee, friend, etc.) 2   To what extent are you able to carry out your everyday physical activities such as walking, climbing stairs, carrying groceries, or moving a chair? 3   In the past 7 days, how often have you been bothered by emotional problems such as feeling anxious, depressed, or irritable? 4   In the past 7 days, how would you rate your fatigue on average? 3   In the past 7 days, how would you rate your pain on average, where 0 means no pain, and 10 means worst imaginable pain? 5   Global Mental Health Score 7   Global Physical Health Score 11   PROMIS TOTAL - SUBSCORES 18   Some recent data might be hidden                Randell Esteves ATC

## 2019-07-16 NOTE — PROGRESS NOTES
Spine Surgery Return Clinic Visit      Chief Complaint:   RECHECK (follow up after CT and injection )      Interval HPI:  Symptom Profile Including: location of symptoms, onset, severity, exacerbating/alleviating factors, previous treatments:        Leeanne Duarte is a 69 year old female who returns now nearly 3 months status post L4 to the pelvis fusion with an L3-4 decompression.     Preoperative leg symptoms are actually much better than they were before surgery.  She had constant numbness and pain before the operation and that is improved for her.  Nonetheless she still dealing with some buttock pain and pain that seems to radiate down the lateral aspect of the leg towards the knee and then the medial aspect of her leg somewhat in an L4 distribution.  She feels like the symptoms of been relatively stable over the last 3 to 4 weeks.  She did separately see my partner Dr. Lai for consideration of possible knee arthritis and x-rays were obtained which were thought to be mild and she was started on some physical therapy.     After her last visit with me in early July, I sent her for a right L4 selective nerve root injection.  She returns today that they absolutely hit the spot.  She says today she is feeling like at a 10 out of 10 new person with 10 being the most positive so she is very happy with her situation.  She says she has no leg symptoms currently.  So this is a dramatic improvement from just a couple weeks ago.            Past Medical History:   No past medical history on file.         Past Surgical History:     Past Surgical History:   Procedure Laterality Date     CATARACT IOL, RT/LT  2014     HARVEST BONE MARROW FROM ILIAC CREST Right 4/3/2019    Procedure: [place] Bone Marrow From Iliac Crest;  Surgeon: Lane Ventura MD;  Location:  OR     OPTICAL TRACKING SYSTEM FUSION SPINE POSTERIOR LUMBAR THREE+ LEVELS N/A 4/3/2019    Procedure: Lumbar 3-4 Decompression, Lumbar 4-Sacral 1  "Transforaminal Lumbar Interbody Fusion, Lumbar 5-Sacral 1 Kaminski Maxwell Osteotomy, Lumbar 4-Pelvis Instrumentation, Illiac Crest Autograft;  Surgeon: Lane Ventura MD;  [facility]            Social History:     Social History     Tobacco Use     Smoking status: Never Smoker     Smokeless tobacco: Never Used   Substance Use Topics     Alcohol use: Yes     Comment: 7 per week            Family History:     Family History   Problem Relation Age of Onset     Colon Cancer Maternal Grandmother      Colon Cancer Maternal Grandfather      High cholesterol Mother      Hypertension Mother      Myocardial Infarction Mother 72     High cholesterol Father      Hypertension Father      Myocardial Infarction Father 76            Allergies:     Allergies   Allergen Reactions     Chocolate      Orange      Gabapentin      Confusion              Medications:     Current Outpatient Medications   Medication     acetaminophen (TYLENOL) 325 MG tablet     Ascorbic Acid (VITAMIN C) 500 MG CAPS     Calcium Citrate-Vitamin D (CALCIUM CITRATE + D) 300-100 MG-UNIT TABS     denosumab (PROLIA) 60 MG/ML SOSY injection     diazepam (VALIUM) 5 MG tablet     diclofenac (VOLTAREN) 1 % topical gel     escitalopram (LEXAPRO) 10 MG tablet     FLUAD 0.5 ML TALI     gabapentin (NEURONTIN) 300 MG capsule     LORAZEPAM PO     methylPREDNISolone (MEDROL DOSEPAK) 4 MG tablet therapy pack     oxyCODONE (ROXICODONE) 5 MG tablet     senna-docusate (SENOKOT-S/PERICOLACE) 8.6-50 MG tablet     No current facility-administered medications for this visit.              Review of Systems:   A focused musculoskeletal and neurologic ROS was performed with pertinent positives and negatives noted in the HPI.  Additional systems were also reviewed and are documented at the bottom of the note.         Physical Exam:   Vitals: Ht 1.473 m (4' 10\")   Wt 44.5 kg (98 lb)   BMI 20.48 kg/m    Musculoskeletal, Neurologic, and Spine:          Lumbar " Spine:    Appearance - No gross stepoffs or deformities    Motor -     L2-3: Hip flexion 5/5 R and 5/5 L strength          L3/4:  Knee extension R 5/5 and L 5/5 strength         L4/5:  Foot dorsiflexion R 5/5 L 5/5 and       EHL dorsiflexion R 5/5 L 5/5 strength         S1:  Plantarflexion/Peroneal Muscles  R 5/5 and L 5/5 strength    Sensation: intact to light touch L3-S1 distribution BLE      Neurologic:      REFLEXES Left Right                  Patella 1+ 1+   Ankle jerk 1+ 1+   Babinski No upgoing great toe No upgoing great toe   Clonus 0 beats 0 beats     Hip Exam:  No pain with hip log roll and no tenderness over the greater trochanters.    Alignment:  Patient stands with a neutral standing sagittal balance.           Imaging:   We ordered and independently reviewed new radiographs at this clinic visit. The results were discussed with the patient. Findings include:     I reviewed her July 2 lumbar CT scan which shows instrumentation in place with no evidence of hardware failure and no severe stenosis.       Assessment and Plan:     69 year old female with good clinical progress at this point.  I told her I am very pleased that the injection has helped her.  I told her if the right leg symptoms return we could order a new right L4 selective nerve root block over the phone and she will call us if she needs this done.  Otherwise I think she can start some stationary bicycle.  I would still avoid heavy lifting over about 25 or 30 pounds and try to avoid repetitive bending or twisting.  Follow-up with me in 6 months with AP and Lateral Lumbar Films at that time.    Respectfully,  Lane Ventura MD  Spine Surgery  [facility]    Answers for HPI/ROS submitted by the patient on 7/16/2019   PHQ9 TOTAL SCORE: 1  General Symptoms: No  Skin Symptoms: No  HENT Symptoms: No  EYE SYMPTOMS: No  HEART SYMPTOMS: No  LUNG SYMPTOMS: No  INTESTINAL SYMPTOMS: No  URINARY SYMPTOMS: Yes  GYNECOLOGIC SYMPTOMS:  No  BREAST SYMPTOMS: No  SKELETAL SYMPTOMS: Yes  BLOOD SYMPTOMS: No  NERVOUS SYSTEM SYMPTOMS: No  MENTAL HEALTH SYMPTOMS: No  Trouble holding urine or incontinence: Yes  Pain or burning: No  Trouble starting or stopping: No  Increased frequency of urination: No  Blood in urine: No  Decreased frequency of urination: No  Frequent nighttime urination: No  Flank pain: No  Back pain: Yes  Muscle aches: No  Neck pain: No  Swollen joints: No  Joint pain: No  Bone pain: No  Muscle cramps: No  Muscle weakness: No  Joint stiffness: No  Bone fracture: No

## 2019-07-17 ASSESSMENT — PATIENT HEALTH QUESTIONNAIRE - PHQ9: SUM OF ALL RESPONSES TO PHQ QUESTIONS 1-9: 1

## 2019-07-22 ENCOUNTER — TELEPHONE (OUTPATIENT)
Dept: ORTHOPEDICS | Facility: CLINIC | Age: 69
End: 2019-07-22

## 2019-07-22 NOTE — TELEPHONE ENCOUNTER
Called pt to offer apt at 2:20 pm on 7/23/19, left our call back number. This is a double booking, if she calls back please schedule her.

## 2019-07-24 ENCOUNTER — RECORDS - HEALTHEAST (OUTPATIENT)
Dept: BONE DENSITY | Facility: CLINIC | Age: 69
End: 2019-07-24

## 2019-07-24 ENCOUNTER — OFFICE VISIT - HEALTHEAST (OUTPATIENT)
Dept: INTERNAL MEDICINE | Facility: CLINIC | Age: 69
End: 2019-07-24

## 2019-07-24 DIAGNOSIS — S22.089G CLOSED FRACTURE OF ELEVENTH THORACIC VERTEBRA WITH DELAYED HEALING, UNSPECIFIED FRACTURE MORPHOLOGY, SUBSEQUENT ENCOUNTER: ICD-10-CM

## 2019-07-24 DIAGNOSIS — M81.0 AGE-RELATED OSTEOPOROSIS WITHOUT CURRENT PATHOLOGICAL FRACTURE: ICD-10-CM

## 2019-10-04 ENCOUNTER — HEALTH MAINTENANCE LETTER (OUTPATIENT)
Age: 69
End: 2019-10-04

## 2019-10-31 ENCOUNTER — RECORDS - HEALTHEAST (OUTPATIENT)
Dept: ADMINISTRATIVE | Facility: OTHER | Age: 69
End: 2019-10-31

## 2019-12-30 DIAGNOSIS — M54.50 LUMBAR PAIN: Primary | ICD-10-CM

## 2019-12-31 ENCOUNTER — OFFICE VISIT (OUTPATIENT)
Dept: ORTHOPEDICS | Facility: CLINIC | Age: 69
End: 2019-12-31
Payer: COMMERCIAL

## 2019-12-31 ENCOUNTER — ANCILLARY PROCEDURE (OUTPATIENT)
Dept: GENERAL RADIOLOGY | Facility: CLINIC | Age: 69
End: 2019-12-31
Attending: ORTHOPAEDIC SURGERY
Payer: COMMERCIAL

## 2019-12-31 ENCOUNTER — ANCILLARY PROCEDURE (OUTPATIENT)
Dept: CT IMAGING | Facility: CLINIC | Age: 69
End: 2019-12-31
Attending: ORTHOPAEDIC SURGERY
Payer: COMMERCIAL

## 2019-12-31 VITALS — WEIGHT: 99 LBS | BODY MASS INDEX: 20.78 KG/M2 | HEIGHT: 58 IN

## 2019-12-31 DIAGNOSIS — M54.50 LUMBAR PAIN: ICD-10-CM

## 2019-12-31 DIAGNOSIS — M54.50 LUMBAR PAIN: Primary | ICD-10-CM

## 2019-12-31 ASSESSMENT — MIFFLIN-ST. JEOR: SCORE: 858.81

## 2019-12-31 NOTE — LETTER
12/31/2019       RE: Leeanne Duarte  [address]  [place] [address]     Dear Colleague,    Thank you for referring your patient, Leeanne Duarte, to the [facility] at [facility]. Please see a copy of my visit note below.    Spine Surgery Return Clinic Visit      Chief Complaint:   RECHECK (back pain towards the left side/hip area )      Interval HPI:  Symptom Profile Including: location of symptoms, onset, severity, exacerbating/alleviating factors, previous treatments:        Leeanne Duarte is a 69 year old female who returns today approximately 8-1/2 to 9 months status post L4 to the pelvis lumbar fusion with L3-4 decompression.  I had last seen her over the summer time.  At that time she was really doing quite well.  She says that between about July and October or November she really had no pain and it was the best she had felt in her entire life.     Since November or so and into early December, she has been dealing with some new right-sided groin symptoms and also some upper left-sided back pain towards the upper end of her incision.  Sometimes the pain will wrap around to the side a bit.  The pain does not go down into the legs below the knees.  This is different in her symptoms last summer when she was having right L4 distribution symptoms down the front of the knee and into the anterior tibia.       Past Medical History:   No past medical history on file.         Past Surgical History:     Past Surgical History:   Procedure Laterality Date     CATARACT IOL, RT/LT  2014     HARVEST BONE MARROW FROM ILIAC CREST Right 4/3/2019    Procedure: Elaine Bone Marrow From Iliac Crest;  Surgeon: Lane Ventura MD;  Location:  OR     OPTICAL TRACKING SYSTEM FUSION SPINE POSTERIOR LUMBAR THREE+ LEVELS N/A 4/3/2019    Procedure: Lumbar 3-4 Decompression, Lumbar 4-Sacral 1 Transforaminal Lumbar Interbody Fusion, Lumbar 5-Sacral  "1 Kaminski Maxwell Osteotomy, Lumbar 4-Pelvis Instrumentation, Illiac Crest Autograft;  Surgeon: Lane Ventura MD;  Location: [facility] OR            Social History:     Social History     Tobacco Use     Smoking status: Never Smoker     Smokeless tobacco: Never Used   Substance Use Topics     Alcohol use: Yes     Comment: 7 per week            Family History:     Family History   Problem Relation Age of Onset     Colon Cancer Maternal Grandmother      Colon Cancer Maternal Grandfather      High cholesterol Mother      Hypertension Mother      Myocardial Infarction Mother 72     High cholesterol Father      Hypertension Father      Myocardial Infarction Father 76            Allergies:     Allergies   Allergen Reactions     Chocolate      Orange      Gabapentin      Confusion              Medications:     Current Outpatient Medications   Medication     acetaminophen (TYLENOL) 325 MG tablet     Ascorbic Acid (VITAMIN C) 500 MG CAPS     Calcium Citrate-Vitamin D (CALCIUM CITRATE + D) 300-100 MG-UNIT TABS     denosumab (PROLIA) 60 MG/ML SOSY injection     diazepam (VALIUM) 5 MG tablet     diclofenac (VOLTAREN) 1 % topical gel     escitalopram (LEXAPRO) 10 MG tablet     FLUAD 0.5 ML TALI     gabapentin (NEURONTIN) 300 MG capsule     LORAZEPAM PO     methylPREDNISolone (MEDROL DOSEPAK) 4 MG tablet therapy pack     oxyCODONE (ROXICODONE) 5 MG tablet     senna-docusate (SENOKOT-S/PERICOLACE) 8.6-50 MG tablet     No current facility-administered medications for this visit.              Review of Systems:   A focused musculoskeletal and neurologic ROS was performed with pertinent positives and negatives noted in the HPI.  Additional systems were also reviewed and are documented at the bottom of the note.         Physical Exam:   Vitals: Ht 1.473 m (4' 10\")   Wt 44.9 kg (99 lb)   BMI 20.69 kg/m     Musculoskeletal, Neurologic, and Spine:          Lumbar Spine:    Appearance - No gross stepoffs or deformities    Motor - "     L2-3: Hip flexion 5/5 R and 5/5 L strength          L3/4:  Knee extension R 5/5 and L 5/5 strength         L4/5:  Foot dorsiflexion R 5/5 L 5/5 and       EHL dorsiflexion R 4/5 L 4/5 strength         S1:  Plantarflexion/Peroneal Muscles  R 5/5 and L 5/5 strength    Sensation: intact to light touch L3-S1 distribution BLE          Hip Exam:  No pain with hip log roll and no tenderness over the greater trochanters.    Alignment:  Patient stands with a neutral standing sagittal balance.           Imaging:   We ordered and independently reviewed new radiographs at this clinic visit. The results were discussed with the patient. Findings include:     Standing lumbar radiographs December 31, 2019 show unchanged position of her implants.  Unchanged L3-4 spondylosis above the previous fusion.       Assessment and Plan:     69 year old female with some new onset left upper back pain as well as right thigh and groin symptoms.  I do not see any obvious hip arthritis on her plain lumbar radiographs.  Although she does have right-sided groin symptoms both the family and the patient are noting that she responded very well to a previous lumbar injection when she was having some similar pain down the leg and they are wondering if this could again be from the spine.  I think it is worth getting a lumbar CT scan to make sure that things are going on to heal well given her new onset back pain.  In addition I recommended a right L3-4 transforaminal epidural injection given that she had really excellent response to the previous L4 selective nerve root block.  I told him I would contact them via my chart once we had the CT results and in case we need to change the plan at all.           Respectfully,  Lane Ventura MD  Spine Surgery  [facility]

## 2019-12-31 NOTE — PROGRESS NOTES
Spine Surgery Return Clinic Visit      Chief Complaint:   RECHECK (back pain towards the left side/hip area )      Interval HPI:  Symptom Profile Including: location of symptoms, onset, severity, exacerbating/alleviating factors, previous treatments:        Leeanne Duarte is a 69 year old female who returns today approximately 8-1/2 to 9 months status post L4 to the pelvis lumbar fusion with L3-4 decompression.  I had last seen her over the summer time.  At that time she was really doing quite well.  She says that between about July and October or November she really had no pain and it was the best she had felt in her entire life.     Since November or so and into early December, she has been dealing with some new right-sided groin symptoms and also some upper left-sided back pain towards the upper end of her incision.  Sometimes the pain will wrap around to the side a bit.  The pain does not go down into the legs below the knees.  This is different in her symptoms last summer when she was having right L4 distribution symptoms down the front of the knee and into the anterior tibia.       Past Medical History:   No past medical history on file.         Past Surgical History:     Past Surgical History:   Procedure Laterality Date     CATARACT IOL, RT/LT  2014     HARVEST BONE MARROW FROM ILIAC CREST Right 4/3/2019    Procedure: [place] Bone Marrow From Iliac Crest;  Surgeon: Lane Ventura MD;  Location: [facility] OR     OPTICAL TRACKING SYSTEM FUSION SPINE POSTERIOR LUMBAR THREE+ LEVELS N/A 4/3/2019    Procedure: Lumbar 3-4 Decompression, Lumbar 4-Sacral 1 Transforaminal Lumbar Interbody Fusion, Lumbar 5-Sacral 1 Kaminski Amxwell Osteotomy, Lumbar 4-Pelvis Instrumentation, Illiac Crest Autograft;  Surgeon: Lane Ventura MD;  Location: [facility] OR            Social History:     Social History     Tobacco Use     Smoking status: Never Smoker     Smokeless tobacco: Never Used   Substance Use Topics  "    Alcohol use: Yes     Comment: 7 per week            Family History:     Family History   Problem Relation Age of Onset     Colon Cancer Maternal Grandmother      Colon Cancer Maternal Grandfather      High cholesterol Mother      Hypertension Mother      Myocardial Infarction Mother 72     High cholesterol Father      Hypertension Father      Myocardial Infarction Father 76            Allergies:     Allergies   Allergen Reactions     Chocolate      Orange      Gabapentin      Confusion              Medications:     Current Outpatient Medications   Medication     acetaminophen (TYLENOL) 325 MG tablet     Ascorbic Acid (VITAMIN C) 500 MG CAPS     Calcium Citrate-Vitamin D (CALCIUM CITRATE + D) 300-100 MG-UNIT TABS     denosumab (PROLIA) 60 MG/ML SOSY injection     diazepam (VALIUM) 5 MG tablet     diclofenac (VOLTAREN) 1 % topical gel     escitalopram (LEXAPRO) 10 MG tablet     FLUAD 0.5 ML TALI     gabapentin (NEURONTIN) 300 MG capsule     LORAZEPAM PO     methylPREDNISolone (MEDROL DOSEPAK) 4 MG tablet therapy pack     oxyCODONE (ROXICODONE) 5 MG tablet     senna-docusate (SENOKOT-S/PERICOLACE) 8.6-50 MG tablet     No current facility-administered medications for this visit.              Review of Systems:   A focused musculoskeletal and neurologic ROS was performed with pertinent positives and negatives noted in the HPI.  Additional systems were also reviewed and are documented at the bottom of the note.         Physical Exam:   Vitals: Ht 1.473 m (4' 10\")   Wt 44.9 kg (99 lb)   BMI 20.69 kg/m    Musculoskeletal, Neurologic, and Spine:          Lumbar Spine:    Appearance - No gross stepoffs or deformities    Motor -     L2-3: Hip flexion 5/5 R and 5/5 L strength          L3/4:  Knee extension R 5/5 and L 5/5 strength         L4/5:  Foot dorsiflexion R 5/5 L 5/5 and       EHL dorsiflexion R 4/5 L 4/5 strength         S1:  Plantarflexion/Peroneal Muscles  R 5/5 and L 5/5 strength    Sensation: intact to light " touch L3-S1 distribution BLE          Hip Exam:  No pain with hip log roll and no tenderness over the greater trochanters.    Alignment:  Patient stands with a neutral standing sagittal balance.           Imaging:   We ordered and independently reviewed new radiographs at this clinic visit. The results were discussed with the patient. Findings include:     Standing lumbar radiographs December 31, 2019 show unchanged position of her implants.  Unchanged L3-4 spondylosis above the previous fusion.       Assessment and Plan:     69 year old female with some new onset left upper back pain as well as right thigh and groin symptoms.  I do not see any obvious hip arthritis on her plain lumbar radiographs.  Although she does have right-sided groin symptoms both the family and the patient are noting that she responded very well to a previous lumbar injection when she was having some similar pain down the leg and they are wondering if this could again be from the spine.  I think it is worth getting a lumbar CT scan to make sure that things are going on to heal well given her new onset back pain.  In addition I recommended a right L3-4 transforaminal epidural injection given that she had really excellent response to the previous L4 selective nerve root block.  I told him I would contact them via my chart once we had the CT results and in case we need to change the plan at all.           Respectfully,  Lane Ventura MD  Spine Surgery  [facility]

## 2019-12-31 NOTE — NURSING NOTE
"Reason For Visit:   Chief Complaint   Patient presents with     RECHECK     back pain towards the left side/hip area        Primary MD: Renée Horne  Ref. MD: Self   Date of surgery: Dr. Ventura   Type of surgery: Dr. Ventura .  Smoker: No  Request smoking cessation information: No    Ht 1.473 m (4' 10\")   Wt 44.9 kg (99 lb)   BMI 20.69 kg/m           Oswestry (BEENA) Questionnaire    OSWESTRY DISABILITY INDEX 12/31/2019   Count 7   Sum 13   Oswestry Score (%) 37.14   Some recent data might be hidden            Neck Disability Index (NDI) Questionnaire    No flowsheet data found.                Promis 10 Assessment    PROMIS 10 7/16/2019   In general, would you say your health is: Good   In general, would you say your quality of life is: Good   In general, how would you rate your physical health? Good   In general, how would you rate your mental health, including your mood and your ability to think? Good   In general, how would you rate your satisfaction with your social activities and relationships? Good   In general, please rate how well you carry out your usual social activities and roles Good   To what extent are you able to carry out your everyday physical activities such as walking, climbing stairs, carrying groceries, or moving a chair? Mostly   How often have you been bothered by emotional problems such as feeling anxious, depressed or irritable? Never   How would you rate your fatigue on average? Mild   How would you rate your pain on average?   0 = No Pain  to  10 = Worst Imaginable Pain 1   Global Physical Health Score : Raw Score -   Global Mental Health Score : Raw Score -   Total (Physical + Mental Health Score) -   In general, would you say your health is: 3   In general, would you say your quality of life is: 3   In general, how would you rate your physical health? 3   In general, how would you rate your mental health, including your mood and your ability to think? 3   In general, how would " you rate your satisfaction with your social activities and relationships? 3   In general, please rate how well you carry out your usual social activities and roles. (This includes activities at home, at work and in your community, and responsibilities as a parent, child, spouse, employee, friend, etc.) 3   To what extent are you able to carry out your everyday physical activities such as walking, climbing stairs, carrying groceries, or moving a chair? 4   In the past 7 days, how often have you been bothered by emotional problems such as feeling anxious, depressed, or irritable? 1   In the past 7 days, how would you rate your fatigue on average? 2   In the past 7 days, how would you rate your pain on average, where 0 means no pain, and 10 means worst imaginable pain? 1   Global Mental Health Score 14   Global Physical Health Score 15   PROMIS TOTAL - SUBSCORES 29   Some recent data might be hidden                Randell Esteves ATC

## 2020-01-06 NOTE — PROGRESS NOTES
Discharge Note    Progress reporting period is from initial evaluation date May 10 to May 31, 2019.      Leeanne failed to follow up and current status is unknown.  Please see information below for last relevant information on current status.  Patient seen for 5 visits.    SUBJECTIVE  Subjective changes noted by patient:  C/o low back and R knee pain. C/o tightness in B legs.  .  Current pain level is 7/10.     Previous pain level was  9/10.   Changes in function:  Yes (See Goal flowsheet attached for changes in current functional level)  Adverse reaction to treatment or activity: None    OBJECTIVE  Changes noted in objective findings:       ASSESSMENT/PLAN  Diagnosis: lumbar pain   Updated problem list and treatment plan:   Pain - HEP  Decreased ROM/flexibility - HEP  Decreased function - HEP  Decreased strength - HEP  Impaired muscle performance - HEP  Decreased proprioception - HEP  Impaired posture - HEP  STG/LTGs have been met or progress has been made towards goals:  Yes, please see goal flowsheet for most current information  Assessment of Progress: current status is unknown.    Last current status:     Self Management Plans:  HEP  I have re-evaluated this patient and find that the nature, scope, duration and intensity of the therapy is appropriate for the medical condition of the patient.  Leeanne continues to require the following intervention to meet STG and LTG's:  HEP.    Recommendations:  Discharge with current home program.  Patient to follow up with MD as needed.    Please refer to the daily flowsheet for treatment today, total treatment time and time spent performing 1:1 timed codes.

## 2020-01-10 ENCOUNTER — AMBULATORY - HEALTHEAST (OUTPATIENT)
Dept: INTERNAL MEDICINE | Facility: CLINIC | Age: 70
End: 2020-01-10

## 2020-01-10 DIAGNOSIS — Z92.29 PERSONAL HISTORY OF OTHER DRUG THERAPY: ICD-10-CM

## 2020-01-10 DIAGNOSIS — M81.0 AGE-RELATED OSTEOPOROSIS WITHOUT CURRENT PATHOLOGICAL FRACTURE: ICD-10-CM

## 2020-01-22 ENCOUNTER — TRANSFERRED RECORDS (OUTPATIENT)
Dept: HEALTH INFORMATION MANAGEMENT | Facility: CLINIC | Age: 70
End: 2020-01-22

## 2020-02-08 ENCOUNTER — HEALTH MAINTENANCE LETTER (OUTPATIENT)
Age: 70
End: 2020-02-08

## 2020-03-03 ENCOUNTER — TELEPHONE (OUTPATIENT)
Dept: ORTHOPEDICS | Facility: CLINIC | Age: 70
End: 2020-03-03

## 2020-03-03 DIAGNOSIS — M54.50 LUMBAR PAIN: Primary | ICD-10-CM

## 2020-03-03 NOTE — TELEPHONE ENCOUNTER
Order faxed to [facility]. Lumbar injection. Per Dr. Ventura's request from .    Julieth Gardner RN

## 2020-03-27 PROBLEM — Z98.1 S/P SPINAL FUSION: Status: RESOLVED | Noted: 2019-05-10 | Resolved: 2020-03-27

## 2020-03-27 PROBLEM — M54.16 LUMBAR RADICULOPATHY: Status: RESOLVED | Noted: 2019-02-13 | Resolved: 2020-03-27

## 2020-04-29 ENCOUNTER — TELEPHONE (OUTPATIENT)
Dept: ORTHOPEDICS | Facility: CLINIC | Age: 70
End: 2020-04-29

## 2020-04-29 NOTE — TELEPHONE ENCOUNTER
[facility] contacted the Call Center and said patient has imaging orders entered.  Due to current coronavirus pandemic, please consider whether this injection is essential or can be deferred post COVID.  Please review order and then confirm or defer orders ASAP.

## 2020-04-29 NOTE — TELEPHONE ENCOUNTER
Called imaging and patient had Injection completed at [facility]. But yes essential.    Regina Mack, ATC

## 2020-07-29 ENCOUNTER — COMMUNICATION - HEALTHEAST (OUTPATIENT)
Dept: ADMINISTRATIVE | Facility: CLINIC | Age: 70
End: 2020-07-29

## 2020-08-07 ENCOUNTER — AMBULATORY - HEALTHEAST (OUTPATIENT)
Dept: INTERNAL MEDICINE | Facility: CLINIC | Age: 70
End: 2020-08-07

## 2020-08-07 DIAGNOSIS — Z92.29 PERSONAL HISTORY OF OTHER DRUG THERAPY: ICD-10-CM

## 2020-08-07 DIAGNOSIS — M81.0 AGE-RELATED OSTEOPOROSIS WITHOUT CURRENT PATHOLOGICAL FRACTURE: ICD-10-CM

## 2020-08-11 ENCOUNTER — OFFICE VISIT - HEALTHEAST (OUTPATIENT)
Dept: INTERNAL MEDICINE | Facility: CLINIC | Age: 70
End: 2020-08-11

## 2020-08-11 DIAGNOSIS — M81.0 AGE-RELATED OSTEOPOROSIS WITHOUT CURRENT PATHOLOGICAL FRACTURE: ICD-10-CM

## 2020-08-11 DIAGNOSIS — S22.000S WEDGE COMPRESSION FRACTURE OF UNSPECIFIED THORACIC VERTEBRA, SEQUELA: ICD-10-CM

## 2020-08-11 LAB
ANION GAP SERPL CALCULATED.3IONS-SCNC: 10 MMOL/L (ref 5–18)
BUN SERPL-MCNC: 9 MG/DL (ref 8–28)
CALCIUM SERPL-MCNC: 9.7 MG/DL (ref 8.5–10.5)
CHLORIDE BLD-SCNC: 94 MMOL/L (ref 98–107)
CO2 SERPL-SCNC: 30 MMOL/L (ref 22–31)
CREAT SERPL-MCNC: 0.88 MG/DL (ref 0.6–1.1)
GFR SERPL CREATININE-BSD FRML MDRD: >60 ML/MIN/1.73M2
GLUCOSE BLD-MCNC: 89 MG/DL (ref 70–125)
POTASSIUM BLD-SCNC: 4.5 MMOL/L (ref 3.5–5)
SODIUM SERPL-SCNC: 134 MMOL/L (ref 136–145)

## 2020-08-12 LAB
25(OH)D3 SERPL-MCNC: 91.8 NG/ML (ref 30–80)
25(OH)D3 SERPL-MCNC: 91.8 NG/ML (ref 30–80)

## 2020-11-08 ENCOUNTER — HEALTH MAINTENANCE LETTER (OUTPATIENT)
Age: 70
End: 2020-11-08

## 2020-11-18 NOTE — PLAN OF CARE
VS:   BP soft but baseline. IVF infusing at 125ml/hr. Afebrile.    Output:   Voided 600ml , PVR 130ml.  Small amount liquid stool x1  HemoVac output =5ml, AJAY output=10ml   Activity:   Up with A1, TLSO brace and walker.  Ambulated to bathroom well. Steady gait . No postural dizziness  or LH   Skin: Intact except for incisions   Pain:   Well controlled. Scheduled Tylenol and oxycodone 5mg q 4hr and Ice pack   Neuro/CMS:   Left foot numbness -resolved.  CMS and Neuros intact.  Bilateral hands- cool at baseline per Raynauds   Dressing(s):   Aucacel dressing to back CDI   Diet:   Tolerates regular diet but poor appetite.  Encouraged PO intacke   LDA:   Sats 94-97% on RA  while awake  1L of oxygen on for sleep at night   Equipment:   PCDs for DVT ppx,   tele --Sinus rhythm .   Plan:   Will continue to monitor    Additional Info:   Calm and cooperative,  no episode of panic attacks when wearing TLSO.       Group Therapy Note    Date: 11/18/2020    Group Start Time: 0900  Group End Time: 9004  Group Topic: Community Meeting    GEMA VEGANESTOR A Judeen Klinefelter        Group Therapy Note    Attendees: 8/18       Patient's Goal:  To orient to unit and set a daily goal     Notes:  Patient attended and participated in group. Daily goal: Take a shower. Patient expressed she has been feeling \"unsteady\" relating to symptoms of withdrawal, and has felt unsafe to shower alone because of this. Patient expresses she hopes she is steady enough to take a shower. During group, peer had talked about some details of incidents leading to hospitalization, and this patient asked \"did you try to commit suicide? \" Peer expressed they had and patient disclosed that she has been \"fantasizing\" about suicide at times    Status After Intervention:  Improved    Participation Level:  Active Listener and Interactive    Participation Quality: Appropriate, Attentive and Sharing      Speech:  normal      Thought Process/Content: Logical  Linear      Affective Functioning: Congruent      Mood: euthymic      Level of consciousness:  Alert and Attentive      Response to Learning: Able to verbalize current knowledge/experience and Progressing to goal       Endings: Suicidality    Modes of Intervention: Education, Support, Socialization, Exploration, Clarifying and Reality-testing      Discipline Responsible: Psychoeducational Specialist      Signature:  Judeen Klinefelter

## 2021-02-11 ENCOUNTER — COMMUNICATION - HEALTHEAST (OUTPATIENT)
Dept: INTERNAL MEDICINE | Facility: CLINIC | Age: 71
End: 2021-02-11

## 2021-02-11 ENCOUNTER — AMBULATORY - HEALTHEAST (OUTPATIENT)
Dept: NURSING | Facility: CLINIC | Age: 71
End: 2021-02-11

## 2021-02-11 DIAGNOSIS — M81.0 AGE-RELATED OSTEOPOROSIS WITHOUT CURRENT PATHOLOGICAL FRACTURE: ICD-10-CM

## 2021-02-12 ENCOUNTER — COMMUNICATION - HEALTHEAST (OUTPATIENT)
Dept: LAB | Facility: CLINIC | Age: 71
End: 2021-02-12

## 2021-02-12 LAB
25(OH)D3 SERPL-MCNC: 138 NG/ML (ref 30–80)
25(OH)D3 SERPL-MCNC: 138 NG/ML (ref 30–80)

## 2021-02-18 ENCOUNTER — COMMUNICATION - HEALTHEAST (OUTPATIENT)
Dept: INTERNAL MEDICINE | Facility: CLINIC | Age: 71
End: 2021-02-18

## 2021-02-24 ENCOUNTER — AMBULATORY - HEALTHEAST (OUTPATIENT)
Dept: INTERNAL MEDICINE | Facility: CLINIC | Age: 71
End: 2021-02-24

## 2021-02-24 DIAGNOSIS — M81.0 AGE-RELATED OSTEOPOROSIS WITHOUT CURRENT PATHOLOGICAL FRACTURE: ICD-10-CM

## 2021-02-24 DIAGNOSIS — Z92.29 PERSONAL HISTORY OF OTHER DRUG THERAPY: ICD-10-CM

## 2021-03-28 ENCOUNTER — HEALTH MAINTENANCE LETTER (OUTPATIENT)
Age: 71
End: 2021-03-28

## 2021-05-07 ENCOUNTER — OFFICE VISIT - HEALTHEAST (OUTPATIENT)
Dept: FAMILY MEDICINE | Facility: CLINIC | Age: 71
End: 2021-05-07

## 2021-05-07 DIAGNOSIS — S22.089G CLOSED FRACTURE OF ELEVENTH THORACIC VERTEBRA WITH DELAYED HEALING, UNSPECIFIED FRACTURE MORPHOLOGY, SUBSEQUENT ENCOUNTER: ICD-10-CM

## 2021-05-07 DIAGNOSIS — F33.42 RECURRENT MAJOR DEPRESSIVE DISORDER, IN FULL REMISSION (H): ICD-10-CM

## 2021-05-07 DIAGNOSIS — M81.0 AGE-RELATED OSTEOPOROSIS WITHOUT CURRENT PATHOLOGICAL FRACTURE: ICD-10-CM

## 2021-05-07 ASSESSMENT — MIFFLIN-ST. JEOR: SCORE: 869.45

## 2021-05-10 LAB
25(OH)D3 SERPL-MCNC: 64.6 NG/ML (ref 30–80)
25(OH)D3 SERPL-MCNC: 64.6 NG/ML (ref 30–80)

## 2021-05-27 VITALS
SYSTOLIC BLOOD PRESSURE: 104 MMHG | WEIGHT: 98.3 LBS | HEIGHT: 60 IN | RESPIRATION RATE: 18 BRPM | TEMPERATURE: 98 F | DIASTOLIC BLOOD PRESSURE: 60 MMHG | BODY MASS INDEX: 19.3 KG/M2 | OXYGEN SATURATION: 100 % | HEART RATE: 68 BPM

## 2021-05-27 NOTE — TELEPHONE ENCOUNTER
Reason contacted:  Pt  Information relayed:  Lasix 20 mg daily in the am will be called in.  Pt needs to follow up on medication by Friday or Monday with Dr. Horne.  Okay to use reserve slot.   Additional questions:  No  Further follow-up needed:  No  Okay to leave a detailed message:  No     Made an appt on 4/12/19 at 4:40 pm.  Confirm with pt that pt uses [facility] on [address] and [address].    Called [facility] at [phone] and called in Lasix 20 mg tablet, take 1 tablet in the AM #30, no refill.

## 2021-05-27 NOTE — TELEPHONE ENCOUNTER
I will prescribe lasix 20 mg po in am.  #30 no refill. Please call in to her pharmacy.  She needs f/u on this med.  I recommend she come in to see me Fri/ Mon- ok to use reserved 20 minute slot.

## 2021-05-27 NOTE — PROGRESS NOTES
"SUBJECTIVE: Leeanne Duarte is a 68 y.o. female with:  Chief Complaint   Patient presents with     Follow-up     post surgery spinal fusion     She is here to f/u on spinal surgery fusion.  She had surgery for spinal decompression on 4/3/19 with Dr. Ventura.  She did well during the surgery and in pot-op period.  She has wound over mid-line and also a suture where there was a drain. That suture needs to be removed.   Has some irritation about suture but no drainage from the wound.  She is taking oxycodone every 4 hours for pain.  No trouble with constipation on the senna-docusate.  Has a follow-up with Dr. Ventura in early May.    She fell and twisted her right ankle before having back surgery.  She had some swelling in the ankle but was able to bear weight.  After her surgery she had some swelling. See nurse triage message-   \"  She states she has been retaining a lot of fluid.  First began when she was in the hospital after surgery.  Water retention has been getting worse daily.  Retaining water mainly in her trunk, thighs and lower legs.  Swelling is equal on both her legs.\"    She started Lasix 20 mg two days ago.  She has lost some fluid. She tells me the swelling was mostly in the right lower ankle / foot.  Having some pain down the leg- mostly over the shin.  That is better now too.    SH: .  Patient Active Problem List   Diagnosis     Vitamin D deficiency     Wedge compression fracture of unspecified thoracic vertebra, sequela     Low back pain with sciatica     Raynaud's disease     Primary insomnia     Inflammatory autoimmune disorder (H)     Anxiety, generalized     Pain in thoracic spine     Major depression, recurrent (H)     Tubular adenoma of colon     Hyponatremia     Fracture of eleventh thoracic vertebra (H)     Osteoporosis      OBJECTIVE: BP 98/56 (Patient Site: Right Arm, Patient Position: Sitting, Cuff Size: Adult Regular)   Pulse 67   Ht 4' 11.25\" (1.505 m)   Wt 103 lb 12.8 oz (47.1 " kg)   BMI 20.79 kg/m   no distress  Lungs: Clear to auscultation.  No retractions or tachypnea.  CV: RRR. S1 and S2 normal.  No murmurs, rubs or gallops.  Back: Midline wound healing well.  Single suture right mid back.  lower extremities: No erythema or rashes.  Swelling over right ankle.  DP 2+ bilaterally.  Negative Mariel's sign. No calf tenderness.    Wt Readings from Last 3 Encounters:   04/12/19 103 lb 12.8 oz (47.1 kg)   07/24/18 (!) 95 lb (43.1 kg)   03/22/18 (!) 97 lb 1.6 oz (44 kg)     Leeanne was seen today for follow-up.    Diagnoses and all orders for this visit:    Status post lumbar spine operative procedure for decompression of spinal cord - She is doing well.  Single suture removed from right back and wound has closed.  Continue pain medication and gradually wean as tolerated.  F/U with spine surgeon.    Swelling of limb- Improved with Lasix.  I would continue lasix for 1 more week then stop.  -     Basic Metabolic Panel  -     Magnesium    Anemia, unspecified type- Recheck hemoglobin since low at discharge.  -     Hemoglobin     Renée Horne

## 2021-05-27 NOTE — TELEPHONE ENCOUNTER
Pt calling  [phone]    Pt had spinal fusion surgery a week ago.  She states she has been retaining a lot of fluid.  First began when she was in the hospital after surgery.  Water retention has been getting worse daily.  Retaining water mainly in her trunk, thighs and lower legs.  Swelling is equal on both her legs.  Pt states she contacted her Surgeon's office/ortho regarding her swelling and they advised her to contact her PCP stating she probably needs lasix.  Denies any breathing difficulty, chest pain or weeping fluid..    Pt also states yesterday she began to experience pain and stiffness in right shin area from knee to ankle - shin pain is constant and gets worse when she is sitting down rather than lying down.  She rates shin pain at it's worst a 7-8/10.  Pt states shin pain gets better after she takes tylenol  And occasionally 2 oxycodone - Pt is trying to take minimal narcotics.  Pt also states she was up walking a lot yesterday     PLAN  Will send information to PCP and will call Pt back as soon as PCP responds.  Sobeida Fraga RN, [facility] RN Triage/Med Refills       PCP Please advise - Pt requesting lasix d/t retaining water after surgery.  Also experiencing one leg shin pain - constant since yesterday.    Reason for Disposition    Thigh or calf pain and only 1 side and present > 1 hour    Protocols used: LEG SWELLING AND EDEMA-A-OH

## 2021-05-30 VITALS — BODY MASS INDEX: 19.76 KG/M2 | WEIGHT: 98 LBS | HEIGHT: 59 IN

## 2021-05-30 VITALS — HEIGHT: 59 IN | BODY MASS INDEX: 19.76 KG/M2 | WEIGHT: 98 LBS

## 2021-05-30 VITALS — HEIGHT: 59 IN | BODY MASS INDEX: 20.26 KG/M2 | WEIGHT: 100.5 LBS

## 2021-05-30 VITALS — BODY MASS INDEX: 19.96 KG/M2 | HEIGHT: 59 IN | WEIGHT: 99 LBS

## 2021-05-30 VITALS — WEIGHT: 99 LBS | BODY MASS INDEX: 20 KG/M2

## 2021-05-30 VITALS — WEIGHT: 97.5 LBS | BODY MASS INDEX: 19.53 KG/M2

## 2021-05-30 NOTE — PATIENT INSTRUCTIONS - HE
Prolia 5th today.  Prolia 6th in 6 months with my nurse. I will see you in 1 year.    DXA in 2 years .   Phone number to schedule [phone].    Daily calcium need is 0025-4561 mg a day from the diet and supplements.  Calcium citrate is easier to digest.  Vitamin D 2000 IU daily recommended.

## 2021-05-30 NOTE — PROGRESS NOTES
1. Age-related osteoporosis without current pathological fracture     2. Closed fracture of eleventh thoracic vertebra with delayed healing, unspecified fracture morphology, subsequent encounter       Patient was educated on safety of Prolia utilizing Patient Counseling Chart for Healthcare Providers, as outlined by the Prolia REMS progam.     Return in about 6 months (around 1/24/2020) for Recheck, Prolia injection.    Patient Instructions   Prolia 5th today.  Prolia 6th in 6 months with my nurse. I will see you in 1 year.    DXA in 2 years .   Phone number to schedule [phone].    Daily calcium need is 5536-2021 mg a day from the diet and supplements.  Calcium citrate is easier to digest.  Vitamin D 2000 IU daily recommended.      Chief Complaint   Patient presents with     Osteoporosis Follow Up     Osteo F/U       /60   Pulse 70   Wt 100 lb 4.8 oz (45.5 kg)   SpO2 99%   BMI 20.09 kg/m        Did you experience any problems with previous Prolia injection? no  Any medication change in the last 6 months? no  Did you take prednisone or other immunosupressant drugs in the last 6 months   (chemo, transplant, rheum, dermatology conditions)? no  Did you have any serious infection in the last 6 months?no  Any recent hospitalizations?no  Do you plan any dental work in the next 2-3 months?no  How much calcium do you take daily from the diet and supplements?1200 mg  How much vit D do you take daily? 2000 IU  Last DXA? Done today,  Reviewed and discussed      Patient is here today for the 5th Prolia injection. Patient tolerated previous injections well.   We discussed calcium and vit D daily needs today.   Next Prolia injection will be in 6 months.     15 minutes spent with the patient and more then 50 % of the time in counseling.  This note has been dictated using voice recognition software. Any grammatical or context distortions are unintentional and inherent to the software      Patient Active Problem List    Diagnosis     Vitamin D deficiency     Wedge compression fracture of unspecified thoracic vertebra, sequela     Low back pain with sciatica     Raynaud's disease     Primary insomnia     Inflammatory autoimmune disorder (H)     Anxiety, generalized     Pain in thoracic spine     Major depression, recurrent (H)     Tubular adenoma of colon     Hyponatremia     Fracture of eleventh thoracic vertebra (H)     Osteoporosis       Current Outpatient Medications on File Prior to Visit   Medication Sig Dispense Refill     escitalopram oxalate (LEXAPRO) 5 MG tablet taking 10 mg daily  2     LORazepam (ATIVAN) 0.5 MG tablet Take 1 mg by mouth daily.        albuterol (PROAIR HFA;PROVENTIL HFA;VENTOLIN HFA) 90 mcg/actuation inhaler Inhale 2 puffs every 6 (six) hours as needed for wheezing. Use with spacer. 1 each 0     calcium citrate-vitamin D3 (CITRACAL + D) 315-250 mg-unit per tablet Take 2 tablets by mouth daily.        fluticasone (FLONASE) 50 mcg/actuation nasal spray 2 sprays into each nostril daily. 18 g 2     ibuprofen 200 mg cap Take 2 tablets by mouth daily as needed.        inhalational spacing device Spcr Use with MDI. Pharmacist advised use 1 each 0     [DISCONTINUED] furosemide (LASIX) 20 MG tablet TK 1 T PO QAM  0     [DISCONTINUED] oxyCODONE (ROXICODONE) 5 MG immediate release tablet Take 5-10 mg by mouth.       Current Facility-Administered Medications on File Prior to Visit   Medication Dose Route Frequency Provider Last Rate Last Dose     denosumab 60 mg (PROLIA 60 mg/ml)  60 mg Subcutaneous Q6 Months Rudy Cisneros MD   60 mg at 01/24/19 1608

## 2021-05-31 VITALS — WEIGHT: 97.4 LBS | BODY MASS INDEX: 19.51 KG/M2

## 2021-05-31 VITALS — WEIGHT: 100 LBS | BODY MASS INDEX: 20.03 KG/M2

## 2021-05-31 VITALS — BODY MASS INDEX: 19.13 KG/M2 | WEIGHT: 95.5 LBS

## 2021-06-01 ENCOUNTER — RECORDS - HEALTHEAST (OUTPATIENT)
Dept: ADMINISTRATIVE | Facility: CLINIC | Age: 71
End: 2021-06-01

## 2021-06-01 VITALS — BODY MASS INDEX: 19.83 KG/M2 | WEIGHT: 99 LBS

## 2021-06-01 VITALS — BODY MASS INDEX: 19.45 KG/M2 | WEIGHT: 97.1 LBS

## 2021-06-01 VITALS — BODY MASS INDEX: 19.03 KG/M2 | WEIGHT: 95 LBS

## 2021-06-02 VITALS — WEIGHT: 103.8 LBS | HEIGHT: 59 IN | BODY MASS INDEX: 20.92 KG/M2

## 2021-06-03 VITALS — BODY MASS INDEX: 20.96 KG/M2 | WEIGHT: 100.3 LBS

## 2021-06-04 VITALS
BODY MASS INDEX: 19.19 KG/M2 | HEART RATE: 66 BPM | WEIGHT: 91.8 LBS | OXYGEN SATURATION: 97 % | SYSTOLIC BLOOD PRESSURE: 118 MMHG | DIASTOLIC BLOOD PRESSURE: 62 MMHG
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
## 2021-06-09 NOTE — PROGRESS NOTES
Mental Health Visit Note    Date of Service: 3/8/2017    Start time: 1108  Stop time: 1202  Individual PsychotherapySession # 1 (this calendar year)    No  was required because the patient speaks and understands English.  Leeanne Duarte is a 66 y.o. female is being seen today for individual psychotherapy to address the following:    Chief Complaint   Patient presents with     Follow-up     Psychotherapy   .     New symptoms or complaints: None    Functional Impairment:   Personal: 4  Family: 3  Work: N/A  Social:3    Clinical Assessment of Mental Status  Mood: Depressed and Anxious  Affect: Congruent with content of speech  Appearance: well groomed,  Speech/Language: Within normal limits  Orientation: Oriented to person, time, and place  Memory: No Evidence of Impairment  Concentration: Within normal limits  Focus: Within normal limits yet she notes increased intention to focus due to typically feeling less focused  Fund of knowledge: adequate  Behavior Towards Examiner: Cooperative  Motor Activity: Within normal limits   Eye Contact: Appropriate  Attention: Within normal limits  Thought Process: Within normal limits  Thought Content: Within normal limits   Judgement: No Evidence of Impairment  Demonstrated Insight: Adequate    Suicidal Risk Assessment  Suicidal Ideation: Absent  Suicidal Plan: No specific plan to harm self  Crisis plan: Patient is able and willing to call 911 if needed.    Homicidal Risk Assessment:   None reported  No crisis plan required due to absence of risk.    Patient's impression of their current status:  Patient believes symptoms show no change overall.  Patient continues to be especially distressed by the following symptoms:  intense shame, depression, anxiety    Therapist impression of patient's current status:  Symptoms  show no change overall.   Today was first session after diagnostic assessment. Patient signed ERIN for her outpatient psychiatrist (Lyndon Stanton MD at  [facility] in [place] [phone]; [phone] fax); ERIN copy to patient and original sent to scanning. Assessed patient's dissociative abilities with JYOTSNA-II: scored 63 indicating experience of dissociative symptoms. E.g., Patient noted being late to appointment due to mild dissociation when driving; i.e., not able to hold attention for duration of drive and got lost. Introduced bilateral stimuli (BLS) with eye movements and butterfly tap. Patient able to hold calm place image without BLS but not with BLS. Educated regarding breathing and mindfulness; identified ways to practice between sessions with emphasis on nonjudgmental stance due to patient's significant shame response.    Type of psychotherapeutic technique provided:  Client-centered therapy  Mindfulness-based therapy  Techniques used:  Mindfulness Distress tolerance BLS from EMDR    Response to psychotherapeutic interventions:  Patient responded to interventions in the following manner: Accepting    Progress toward short term goals:  Patient is engaging in the therapeutic process.     Review of long-term goals:  Not done at today's visit. Treatment planning in process.    NECESSITY: The individual session was necessary for the care of the patient to address difficulties in psychosocial functioning that are affected by and affect the patient's ability to cope with and heal from spinal conditions and are affected by her mental health diagnosis listed in the Assessment section below.     Diagnosis:   1. Persistent depressive disorder    2. Generalized anxiety disorder      Plan and Follow-Up:  Patient will return for follow-up psychotherapy session in one - two weeks depending on patient's availability    Patient will complete the following homework before our next session:  Practice of mindful breathing    Discharge Criteria/ Planning:  Patient will continue with follow-up until therapy can be discontinued without return of symptoms.      Kati LARA  Lidya, CANDACE   3/8/2017      Correction to note: Patient's JYOTSNA-II score is 22.5 (not 63 as noted above).    JYOTSNA Taxon : 0.03405

## 2021-06-09 NOTE — PROGRESS NOTES
Mental Health Visit Note    Date of Service: 3/29/2017    Start time: 0936  Stop time: 1020  Individual PsychotherapySession # 2 (this calendar year)    No  was required because the patient speaks and understands English.  Leeanne Duarte is a 66 y.o. female is being seen today for individual psychotherapy to address the following:    Chief Complaint   Patient presents with     Follow-up     Psychotherapy   .     New symptoms or complaints: None    Functional Impairment:   Personal: 3  Family: 3  Work: N/A  Social:3    Clinical Assessment of Mental Status  Mood: Depressed  Affect: Congruent with content of speech and Tearful  Appearance: well groomed,  Speech/Language: Within normal limits  Orientation: Oriented to person, time, and place  Memory: No Evidence of Impairment  Concentration: Within normal limits  Focus: Within normal limits  Fund of knowledge: adequate  Behavior Towards Examiner: Cooperative  Motor Activity: Within normal limits   Eye Contact: Appropriate  Attention: Within normal limits  Thought Process: Within normal limits  Thought Content: Within normal limits   Judgement: No Evidence of Impairment  Demonstrated Insight: Adequate    Suicidal Risk Assessment  Suicidal Ideation: Absent  Suicidal Plan: No specific plan to harm self  Crisis plan: Patient is able and willing to call 911 if needed.    Homicidal Risk Assessment:   None reported  No crisis plan required due to absence of risk.    Patient's impression of their current status:  Patient believes symptoms show no change overall.  Patient continues to be especially distressed by the following symptoms:  shame, low self esteem, depression, pain, lack of connection with     Therapist impression of patient's current status:  Symptoms  show no change overall.   Created treatment plan with patient; copy provided to patient and original sent to scanning. Processed thoughts and feelings and identified ways to approach in  therapy.    Type of psychotherapeutic technique provided:  Cognitive Behavioral Therapy  Client-centered therapy  Mindfulness-based therapy  Techniques used:  Goal Setting   Identification of emotions.  Identification of automatic thoughts Mindfulness    Response to psychotherapeutic interventions:  Patient responded to interventions in the following manner: Accepting    Progress toward short term goals:  Patient is engaging in the therapeutic process.     Review of long-term goals:  Long-term goals set today in cooperation with the client and read as follows:  learn coping skills to manage depression and anxiety    NECESSITY: The individual session was necessary for the care of the patient to address difficulties in psychosocial functioning that are affected by and affect the patient's ability to cope with and heal from spinal conditions and are affected by her mental health diagnosis listed in the Assessment section below.     Diagnosis:   1. Persistent depressive disorder    2. Generalized anxiety disorder      Plan and Follow-Up:  Patient will return for follow-up psychotherapy session in one week    Patient will complete the following homework before our next session:  Practice of mindfulness techniques     Discharge Criteria/ Planning:  Patient will continue with follow-up until therapy can be discontinued without return of symptoms.      Ktai Bose, [facility]   3/29/2017

## 2021-06-09 NOTE — PROGRESS NOTES
Assessment/Plan:      Diagnoses and all orders for this visit:    Lumbar spine pain  -     OPS Medial Branch Block Bilateral; Future; Expected date: 3/6/17    Spondylolisthesis of lumbar region  -     OPS Medial Branch Block Bilateral; Future; Expected date: 3/6/17    Arthropathy of lumbar facet joint  -     OPS Medial Branch Block Bilateral; Future; Expected date: 3/6/17    Myofascial pain    Scoliosis    Thoracic compression fracture  -     XR Thoracic Spine 2 VWS; Future; Expected date: 3/6/17  -     XR Lumbar Spine 2 or 3 VWS; Future; Expected date: 3/6/17    Osteoporosis  -     Ambulatory referral to Osteoporosis Care        Assessment:    1.  Chronic lumbar spine pain that is quite severe low back is worse than the right lower extremity symptoms.  Her lumbar spine pain is likely related to facet arthropathy.  Spondylolisthesis L4 on L5 with severe facet arthropathy L4-5 and L5-S1.    2.  Right lower extremity radicular symptoms in an L5 distribution.  Severe right foraminal stenosis L5-S1 related to broad-based disc bulge.    3.  Myofascial pain and lumbar spine.    4.  History of compression fracture T11.  Fairly significant compression.    5.  Osteoporosis.  Last T score in April 2016 -1.7 but with the compression fracture has diagnosis of osteoporosis.  Treated at the [facility] [place] [place] with Reclast.     6.  Depression    Discussion :    1. Discussed the diagnosis and treatment options.  We discussed the back pain along with the right leg radicular symptoms.  She has had a epidural in the past on the right at L5-S1 along with physical therapy.  Tried multiple medications.  Ibuprofen is the best for her.  Has also had physical therapy.    2.  Trial medial branch blocks in the lumbar spine L3, 4, 5.  This will determine if she has  facet arthropathy causing some of her pain.    3.  Will obtain plain films of the thoracic and lumbar spine to evaluate for any change in anatomy such as new fracture.    4.   Consideration for surgical referral for second opinion from Dr. Moscoso but we'll wait and see how the injections work for her.    5.  She would like to see our osteoporosis clinic.  We'll refer her to Dr. Cisneros for evaluation.  She has had Reclast in the past at the [facility] and has a T11 fracture.    6.  Follow up with me after injection.  For evaluation.    35 minutes were spent with this patient in addition to any procedure with greater than 25 minutes in counseling and coordination of care.    It was our pleasure caring for your patient today, if there any questions or concerns please do not hesitate to contact us.      Subjective:   Patient ID: Leeanne Duarte is a 66 y.o. female.    History of Present Illness: Patient presents for evaluation of low back pain thoracolumbar pain right leg pain and paresthesias and also is having some left leg pain.  She did bring records from [facility] today.  Her pain has not changed since last visit per 4/10 today 10/10 at worst.  Worse with standing or sitting for 5-10 minutes.  Better with lying down.  Has tried massage and acupuncture which do give some mild temporary benefit is cost prohibitive at this point.  She was pleased with those treatments but it is not giving long-lasting relief.  Tried meloxicam but causes dizziness and some poor balance and she has stopped taking the medication.She is having some new achiness in the left hip with walking.  Points to the greater trochanter.    She did bring records from [facility] I reviewed them today.  She has had physical therapy.  She had a left L5-S1 transforaminal epidural steroid injection and she does not report any significant benefit.  Her back is worse than her leg at this point.  She also has questionable osteoporosis.  Last T score with DEXA was done April 2016.  -1.7.  She has had an old T11 fracture which places her in the range of osteoporosis.  Treated with an IV infusion  through the [facility].  She was not too terribly pleased with that encounter.  She had IV reclast.      Imaging:MRI report and images were personally reviewed and discussed with the patient.  A plastic model was utilized during the discussion.  MRI of the lumbar spine personally reviewed.  From the [facility].  This shows a grade 1 spondylolisthesis L4 and L5 related to degenerative disc disease and facet arthropathy.  Degenerative changes at L5-S1.  There is moderate facet arthropathy at L4-5.  L5-S1 slightly asymmetric right disc bulge with severe facet arthropathy resulted in some mild lateral recess stenosis and moderate to severe right and mild left foraminal stenosis.  There is a scoliosis seen as well with some mild central stenosis L3-4.    Review of Systems: Complains of numbness and tingling in the right leg with weakness.  Nothing new.  She has dizziness and poor balance related to the meloxicam.  No headaches nausea vomiting or blurred vision.      Past Medical History:   Diagnosis Date     Anxiety      Depression      Osteoporosis        The following portions of the patient's history were reviewed and updated as appropriate: allergies, current medications, past family history, past medical history, past social history, past surgical history and problem list.      Objective:   Physical Exam:    Vitals:    03/06/17 0913   BP: 115/57   Pulse: 65       General: Alert and oriented with normal affect. Attention, knowledge, memory, and language are intact. No acute distress.  Tearful at times today.  Eyes: Sclerae are clear.  Respirations: Unlabored. CV: No lower extremity edema.   Gait:  Nonantalgic  Pain to palpation over the left greater trochanteric bursa.    Sensation is intact to light touch throughout the   lower extremities.  Reflexes are  2+ patellar and 1+ Achilles  .    Manual muscle testing reveals:  Right /Left out of 5     5/5 knee flexors  5/5 knee extensors  5/5 ankle  plantar flexors  5/5 ankle dorsiflexors  5/5    ankle evertors

## 2021-06-09 NOTE — PROGRESS NOTES
Outpatient Mental Health Treatment Plan    Name:  Leeanne Duarte  :  1950  MRN:  [id number]    Treatment Plan:  Initial Treatment Plan  Intake/initial treatment plan date:  17  Benefit and risks and alternatives have been discussed: Yes  Is this treatment appropriate with minimal intrusion/restrictions: Yes  Estimated duration of treatment:  Approximately 12 sessions.  Anticipated frequency of services:  Every 1 - 2 weeks  Necessity for frequency: This frequency is needed to establish therapeutic goals and for continuity of care in order to monitor progress.  Necessity for treatment: To address cognitive, behavioral, and/or emotional barriers in order to work toward goals and to improve quality of life.      Plan:      ? Depression and Anxiety   Goal:  Patient will identify at least three coping skills she is able to apply to manage her depression and anxiety symptoms.   Strategies:    - Incorporate mind body medicine techniques and cognitive behavioral coping skills    - Decrease social isolation   - Increase involvement in meaningful activities   - Discuss sleep hygiene   - Explore thoughts and expectations about self and others   - Process grief (loss of significant person, independence, role, etc.)   - Assess for suicide risk   - Implement physical activity routine (with physician approval)   - Continue compliance with medical treatment plan (or explore barriers)       ?    Degree to which this is a problem (1-4): 4  Degree to which goal is met (1-4): 1  Date of Review: 17      Functional Impairment: 1=Not at all/Rarely  2=Some days  3=Most Days  4=Every Day   Personal: 3  Family: 3  Social: 3  Work: N/A    Diagnosis:  Persistent Depressive Disorder  Generalized Anxiety Disorder    Initial scores:  PHQ-9: 21  ALEKSANDR-7: 17  JYOTSNA-II: 22.5 (mean); 0.20908 (taxon)  WHODAS 2.0 12-item version 39.58%   H1= 30  H2= 0  H3= 4      Strengths:  Patient values helping others and has volunteered for many  "organizations over the years. 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  Limitations:  The patient has the following limitations:  excessive amounts of self blame and guilt, very low self esteem, describes self as a \"loser,\" chronic pain interferes with the activities she would like to do.  Cultural Considerations: Patient is a White female, grew up [place] [employer], in a two-parent household in which both parents were alcohol dependent. She had nine siblings; one brother  by suicide when he was 21. The culture in her family of origin was that daughters were less valuable than sons; girls were not useful and in the way.    Persons responsible for this plan:  ? Provider and Patient      Provider: Performed and documented by PRIYANKA Weiss 3/29/2017    Date:  3/29/2017  Time:  9:30 AM        Patient Signature:____________________________________ Date: ______________         Therapist Signature: __________________________________ Date: ______________        "

## 2021-06-10 NOTE — PROGRESS NOTES
Assessment/Plan:      Diagnoses and all orders for this visit:    Lumbar spine pain  -     Ambulatory referral to Physical Therapy    Lumbar spondylosis  -     Ambulatory referral to Physical Therapy    Spondylolisthesis of lumbar region  -     Ambulatory referral to Physical Therapy    T11 vertebral fracture  -     Ambulatory referral to Physical Therapy    Myofascial pain  -     Ambulatory referral to Physical Therapy    Depression        Assessment:    1. Chronic lumbar spine pain with some previous  right lower extremity symptoms.  A significant portion of her lumbar spine pain is related to facet arthropathy. Spondylolisthesis L4 on L5 with severe facet arthropathy L4-5 and L5-S1.  Much improved following lumbar facet injections at L4-5 and L5-S1.  Her right lower extremity symptoms have resolved.     2. Right lower extremity radicular symptoms in an L5 distribution. Severe right foraminal stenosis L5-S1 related to broad-based disc bulge.  Resolved with lumbar facet injections     3. Myofascial pain and lumbar spine.     4. History of compression fracture T11. Fairly significant compression.  Worsening pain over the compression fracture site recently.     5. Osteoporosis. Last T score in April 2016 -1.7 but with the compression fracture has diagnosis of osteoporosis. Treated at the [facility] with Reclast.      6. Depression      Discussion:    1.   I discussed the diagnosis and treatment options.  I discussed that her pain is returned recently and the thoracic spine pain likely was related to posture from previous   However her lumbar spine pain is still significantly improved and she is doing quite well overall.  We discussed options including therapy, medications, further injections.  She would like to avoid further medications and injections at this time.  2.  Although she has had therapy in the past I would recommend physical therapy program focusing in a neutral-based program as extension causes  pain at the facet region the lumbar spine and flexion causes pain in the T11 compression fracture region.  Core strengthening and range of motion is recommended.  3.  She should continue with behavioral health for mind-body management of her pain and depression.  She is doing quite well on recent addition of Lexapro is quite pleased.  4.  Can try everton chi or yoga  on her own will need to modify exercise.  5.  She will follow-up with me as needed if symptoms worsen.    It was our pleasure caring for your patient today, if there any questions or concerns please do not hesitate to contact us.    25 minutes were spent with this patient in addition to any procedure with greater than 20 minutes in counseling and coordination of care.      Subjective:   Patient ID: Leeanne Duarte is a 67 y.o. female.    History of Present Illness: The patient presents for evaluation of multiple pain complaints.  Overall she is doing quite well.  She had medial branch blocks followed by lumbar facet injections L4-5 and L5-S1.  She had several days of significant reduction of all of her back pain near total relief.  She was able to take a trip to [facility] and did a lot of walking.  Over the past week her pain has started to increase particularly in the mid back at the thoracolumbar junction.  There is a prominent spinous process that she points out is painful.  The pain is worsening and is starting to bother her with any standing and walking.    She also has lower lumbar spine stiffness is started to return.  Worse with extension of the lumbar spine and the mid back feels better with extension.  Standing and walking seems to be difficult for her in general.  Her pain is a 2/10 today 8/10 at worst.  Overall she is happy but wondering what else can be done for her back.    She also has been struggling with depression and been seeing Kati and behavioral health.  She is doing mindfulness and feels this is quite helpful.  She also started  Lexapro and feels that her overall condition has significantly improved but with this worsening pain recently she is wondering if there is anything else to be done.  She has had some physical therapy in the past and has not done exercises recently.  She wants to avoid medications that cause drowsiness and she is on Lexapro and lorazepam.  She also has questions regarding exercise such as yoga.    Imaging: Personally reviewed MRI images of the lumbar spine.  T11 compression fracture fairly severe with no significant central stenosis.  Significant spondylosis degenerative disc disease and facet arthropathy most significant L4-5 and L5-S1 with spondylolisthesis L4 and L5.    Review of Systems: Has some weakness in her back.  No numbness, tingling  .  No bowel or bladder incontinence.  No headaches, dizziness, nausea, vomiting, blurred vision or balance deficits.    Past Medical History:   Diagnosis Date     Anxiety      Depression      Osteoporosis        The following portions of the patient's history were reviewed and updated as appropriate: allergies, current medications, past family history, past medical history, past social history, past surgical history and problem list.      Objective:   Physical Exam:    Vitals:    04/26/17 0916   BP: 113/59   Pulse: 71       General: Alert and oriented with normal affect. Attention, knowledge, memory, and language are intact. No acute distress.  She is smiling and happy today.  Eyes: Sclerae are clear.  Respirations: Unlabored. CV: No lower extremity edema.   Gait:  Nonantalgic    Sensation is intact to light touch throughout the  lower extremities.  Reflexes are  2+ patellar and Achilles .    Manual muscle testing reveals:  Right /Left out of 5     5/5 ankle plantar flexors  5/5 ankle dorsiflexors  5/5   ankle evertors

## 2021-06-10 NOTE — PATIENT INSTRUCTIONS - HE
Prolia 7th today.  Prolia 8th in 6 months with my nurse. I will see you in 1 year.    DXA in 7/2021 .   Phone number to schedule [phone].    Daily calcium need is 4440-1718 mg a day from the diet and supplements.  Calcium citrate is easier to digest.  Vitamin D 2000 IU daily recommended.

## 2021-06-10 NOTE — PROGRESS NOTES
FEMALE ADULT PREVENTIVE EXAM    CHIEF COMPLAINT:  Female preventive exam.    SUBJECTIVE:  Leeanne Duarte is a 67 y.o. female who presents for her routine physical exam.    Patient would like to address the following concerns today:   Chief Complaint   Patient presents with     Annual Exam     FASTING     URI     X 3 DAYS     Sore Throat     X 3 DAYS     Fever     X 3 DAYS, TOOK 2 IBUPROFEN     Generalized Body Aches      1) She started with a sore throat then fever up to 102 last night.  She took some Tylenol and ibuprofen today.  No rhinorrhea or congestion.  No cough.  She has some severe myalgias.  She feels weak and tired.  No headache.  No exposure to children.  No flu shot.    2) Depression/ Anxiety - She has been taking Lexapro and it has helped a lot.  She has seen a psychiatrist in the past.  She takes Ativan 0.5 mg twice a day which helps with insomnia.  She is planning to take MBSR course in future.  [facility] with chronic [facility].      3) She has been seeing the [facility] for back pain.  She has pain that limits her activity.  She used to be very active and spend lot of time in nature.  She wonders about using medical marijuana for her pain condition.      GYNE HISTORY  2 pregnancies with  x 2.  No abnormal pap smears.  She is taking Premarin.  No vaginal bleeding.      She  has a past medical history of Anxiety; Depression; and Osteoporosis.    No results found for: WBC, HGB, HCT, MCV, PLT, NA, K, BUN  No results found for: CHOL, HDL, LDLCALC, TRIG  No results found for: TSH  BP Readings from Last 3 Encounters:   17 90/60   17 113/59   17 96/58       Surgeries:    Past Surgical History:   Procedure Laterality Date     CATARACT EXTRACTION, BILATERAL       HAMSTRING SURGERY       KIDNEY STONE SURGERY         Family History:  Her family history includes Acute Myocardial Infarction in her father and mother; Alcohol abuse in her brother,  "father, maternal uncle, mother, paternal aunt, paternal uncle, and sister; Arthritis in her maternal grandmother and mother; Colon cancer in her maternal grandfather and maternal grandmother; Dementia in her brother; Depression in her mother; Diabetes in her sister; Heart disease in her paternal uncle; Hyperlipidemia in her father and mother; Hypertension in her father and mother; No Medical Problems in her son; Prostate cancer in her father.    Social History:  She  reports that she has never smoked. She has never used smokeless tobacco. She reports that she does not drink alcohol or use illicit drugs. .  2 adult sons.  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 Diagnosed with lupus/ arthritis in the past so retired years ago.  Volunteers.  No alcohol.    Medications:    Current Outpatient Prescriptions:      calcium citrate-vitamin D3 (CITRACAL + D) 315-250 mg-unit per tablet, Take 4 tablets by mouth daily. , Disp: , Rfl:      escitalopram oxalate (LEXAPRO) 10 MG tablet, TK 1 T PO QD, Disp: , Rfl: 2     estrogens, conjugated, (PREMARIN) 0.3 MG tablet, Take 0.3 mg by mouth daily. , Disp: , Rfl:      ibuprofen 200 mg cap, Take 4 tablets by mouth daily as needed. , Disp: , Rfl:      LORazepam (ATIVAN) 0.5 MG tablet, Take 1 mg by mouth daily. , Disp: , Rfl:   HELD MEDICATIONS: None.  Supplements: vitamin C  Allergies:  No latex allergies.  Allergies   Allergen Reactions     Chocolate Other (See Comments)     Orange Other (See Comments)            RISK BEHAVIOR & HEALTH HABITS  Regular Exercise: fly  in past before back pain but no current exercise.  Balanced diet: yes- plant-based diet.  Calcium/vitamin D: yes  Stress management: used to meditate  Seat Belt Use: yes    Dexa: 2016 at [place]- osteopenia  Colonoscopy: 2012 - unable to find results.  Had \"advanced adenoma\" colonoscopy 2010.  Mammogram: 4/15 at [place] - " negative    Dental Care: YES    REVIEW OF SYSTEMS:  Complete head to toe review of systems is otherwise negative except as above.    OBJECTIVE:  VITAL SIGNS:    Vitals:    05/02/17 1713   BP: 90/60   Pulse: 64   Resp: 12   Temp: 97.4  F (36.3  C)     GENERAL:  Patient alert, in no acute distress.  EYES: PERRLA. Extraoccular movements intact, pupils equal, reactive to light and accommodation.  Normal conjunctiva and lids.   ENT:  Hearing grossly normal.  Normal appearance to ears and nose.  Bilateral TM s, external canals, oropharynx normal. Normal lips, gums and teeth.   NECK:  Supple, without thyromegaly or mass.  RESP:  Clear to auscultation without crackles, wheezes or distress.  Normal respiratory effort.   CV:  Regular rate and rhythm without murmurs, rubs or gallops.  Normal pedal pulses.  No varicosities or edema.  ABDOMEN:  Soft, non-tender, without hepatosplenomegaly, masses, or hernias.   BREASTS:  Nontender, without masses, nipple discharge, erythema, or axillary adenopathy.    LYMPHATIC: No cervical or axillary lymphadenopathy.  No bruising.  NEURO:  CN II-XII intact, motor & sensory function all intact.  DTR and reflexes normal.  PSYCHIATRIC:  Alert & oriented with normal mood and affect.  Good judgment and insight.  SKIN:  Normal inspection and palpation.  MUSCULOSKELETAL: Normal gait and station.  - Spine / Ribs / Pelvis: Normal inspection, ROM, stability and strength: Spine, Head, Neck, Upper and Lower Extremities.    Rapid strep: negative    Leeanne was seen today for annual exam, uri, sore throat, fever and generalized body aches.    Diagnoses and all orders for this visit:    Routine general medical examination at a health care facility  Recommend mammogram.  Will try to obtain colonoscopy results.    Pharyngitis- viral illness.  -     Rapid Strep A Screen-Throat  -     Group A Strep, RNA Direct Detection, Throat    Encounter for screening for other metabolic disorders  -     Lipid Cascade  FASTING  -     Vitamin D, Total (25-Hydroxy)  -     Comprehensive Metabolic Panel    Major depression, recurrent    Anxiety, generalized    Primary insomnia    Tubular adenoma of colon       Patient Instructions   [place] Department of Health web site - medical marijuana    Depression Resources:    Diet:  Whole foods, low sugar    Supplements:  Vitamin D  Fish oil- 1000 -2000 mg daily  B complex or multivitamin    Books:  The Chemistry of Faith - by Dr. Vamsi Andrade  Unstuck - by Dr. Sumeet Mansfield  The Mindful Way Through Depression - by Pablito Shelley    Anxiety Resources:    Diet:  Whole foods, limit sugar  Wean off or cut back on caffeine - ideally switch to green or white tea vs coffee  Limit alcohol    Supplements:  Magnesium glycinate or citrate - 400 - 600 mg daily in divided doses  Fish oil- 1000 -2000 mg daily  Multivitamins or vitamin B complex    Sleep:  Lavender essential oil - use 1-2 drops on cotton ball, tape cotton ball to pillow or bedclothes  Diaphragmatic breathing for 5 minutes before sleep   Limit screen time- no TV/electronics use 1/2 hour before bedtime  Turn off Wi-Fi at night  Guided imagery CD/MP3 - healthjourneys.com    Books:  The Chemistry of Calm - by Dr. Vamsi Andrade  When Panic Attacks - by Minh Thapa            Patient will f/u after lab results back to discuss further treatment for back pain/ anxiety/ depression.    Renée Horne

## 2021-06-10 NOTE — TELEPHONE ENCOUNTER
Patient scheduled with Dr. Cisneros for tomorrow 7/30/2020 at 3:40 PM to f/u for osteo and get prolia injection. Patient notified she is not due for DXA until next year.  Lynsey Macias CMA ............... 12:00 PM, 07/29/20

## 2021-06-10 NOTE — PROGRESS NOTES
1. Age-related osteoporosis without current pathological fracture  DXA Bone Density Scan    Basic Metabolic Panel    Vitamin D, Total (25-Hydroxy)   2. Wedge compression fracture of unspecified thoracic vertebra, sequela       Patient was educated on safety of Prolia utilizing Patient Counseling Chart for Healthcare Providers, as outlined by the Prolia REMS progam.     Return in about 6 months (around 2/11/2021) for Prolia injection, Recheck.    Patient Instructions   Prolia 7th today.  Prolia 8th in 6 months with my nurse. I will see you in 1 year.    DXA in 7/2021 .   Phone number to schedule [phone].    Daily calcium need is 4798-8460 mg a day from the diet and supplements.  Calcium citrate is easier to digest.  Vitamin D 2000 IU daily recommended.      Chief Complaint   Patient presents with     Osteoporosis Follow Up     Osteo F/U       /62   Pulse 66   Wt (!) 91 lb 12.8 oz (41.6 kg)   SpO2 97%   BMI 18.39 kg/m        Did you experience any problems with previous Prolia injection? no  Any medication change in the last 6 months? no  Did you take prednisone or other immunosupressant drugs in the last 6 months   (chemo, transplant, rheum, dermatology conditions)? no  Did you have any serious infection in the last 6 months?no  Any recent hospitalizations?no  Do you plan any dental work in the next 2-3 months?no  How much calcium do you take daily from the diet and supplements?1200 mg  How much vit D do you take daily? 2000 IU  Last DXA? 7/2019,  Reviewed and discussed      Patient is here today for the 7th Prolia injection. Patient tolerated previous injections well.   We discussed calcium and vit D daily needs today.   I reviewed her labs:  Component      Latest Ref Rng & Units 4/12/2019   Sodium      136 - 145 mmol/L 132 (L)   Potassium      3.5 - 5.0 mmol/L 5.0   Chloride      98 - 107 mmol/L 94 (L)   CO2      22 - 31 mmol/L 31   Anion Gap, Calculation      5 - 18 mmol/L 7   Glucose      70 -  125 mg/dL 97   Calcium      8.5 - 10.5 mg/dL 9.7   BUN      8 - 22 mg/dL 10   Creatinine      0.60 - 1.10 mg/dL 0.73   GFR MDRD Af Amer      >60 mL/min/1.73m2 >60   GFR MDRD Non Af Amer      >60 mL/min/1.73m2 >60     Next Prolia injection will be in 6 months.     Patient was educated on safety of Prolia utilizing Patient Counseling Chart for Healthcare Providers, as outlined by the Prolia REMS progam.     15 minutes spent with the patient and more then 50 % of the time in counseling about osteoporosis, available osteoporosis treatment options, medication side effects and contraindications, supplement use and weightbearing exercise.    This note has been dictated using voice recognition software. Any grammatical or context distortions are unintentional and inherent to the software      Patient Active Problem List   Diagnosis     Vitamin D deficiency     Wedge compression fracture of unspecified thoracic vertebra, sequela     Low back pain with sciatica     Raynaud's disease     Primary insomnia     Inflammatory autoimmune disorder (H)     Anxiety, generalized     Pain in thoracic spine     Major depression, recurrent (H)     Tubular adenoma of colon     Hyponatremia     Fracture of eleventh thoracic vertebra (H)     Osteoporosis       Current Outpatient Medications on File Prior to Visit   Medication Sig Dispense Refill     calcium citrate-vitamin D3 (CITRACAL + D) 315-250 mg-unit per tablet Take 2 tablets by mouth daily.        escitalopram oxalate (LEXAPRO) 5 MG tablet taking 10 mg daily  2     LORazepam (ATIVAN) 0.5 MG tablet Take 1 mg by mouth daily.        albuterol (PROAIR HFA;PROVENTIL HFA;VENTOLIN HFA) 90 mcg/actuation inhaler Inhale 2 puffs every 6 (six) hours as needed for wheezing. Use with spacer. 1 each 0     fluticasone (FLONASE) 50 mcg/actuation nasal spray 2 sprays into each nostril daily. 18 g 2     ibuprofen 200 mg cap Take 2 tablets by mouth daily as needed.        inhalational spacing device Spcr  Use with MDI. Pharmacist advised use 1 each 0     Current Facility-Administered Medications on File Prior to Visit   Medication Dose Route Frequency Provider Last Rate Last Dose     [START ON 8/21/2020] denosumab 60 mg (PROLIA 60 mg/ml)  60 mg Subcutaneous Q6 Months Rudy Cisneros MD         [DISCONTINUED] denosumab 60 mg (PROLIA 60 mg/ml)  60 mg Subcutaneous Q6 Months Rudy Cisneros MD

## 2021-06-10 NOTE — PROGRESS NOTES
SUBJECTIVE: Leeanne Duarte is a 67 y.o. female with:  Chief Complaint   Patient presents with     Follow-up     LDL RESULTS     Depression     WAS TOLD TO COME BACK FOR THIS     Immunizations     pREVNAR    1) Elevated LDL cholesterol - She has had elevated LDL in the past.  In the past she has cut back on saturated fat and this did have an effect.  Both parents had elevated cholesterol and took statin drugs but also ate lots of red meat/ dairy products.  She is wondering what she can do to lower LDL without medicines.    2) Low sodium - She has had low sodium 131 in 2016.  No alcohol.  No diuretics.  She does use No- Salt on food ( potassium chloride).  She did not have low sodium back in 2014.  No new medicines.  She does not excessively drink water.    3) Depression - She has had depression for years.  She feels Lexapro is helpful but does not completely take away her symptoms.  She really is not interested going on more medication.  She sees a psychiatrist every couple of weeks.  When she was younger she was very active and felt she had a purpose. Now that she is not working and has physical limitations, she finds it hard to get going and be motivated about life.  She denies any issues with her partner but does not feels they always share same interests.  She has two sons who she is close with.  She is interested in trying any approach to help improve her mood.  She considers herself Jainism but joined a [employer] for community although she does not really feel that connected to the Latter day.  When she was younger she loved exercising outdoors.    OBJECTIVE: BP 90/56 (Patient Site: Right Arm, Patient Position: Sitting, Cuff Size: Adult Regular)  Pulse 64  Temp 97.8  F (36.6  C) (Oral)   Resp 12  Wt (!) 97 lb 8 oz (44.2 kg)  SpO2 98% Comment: RA  Breastfeeding? No  BMI 19.53 kg/m2 no distress.  PSYCH:  Awake, alert, and oriented x 3.  Mood and affect are slightly depressed.  Good eye contact.   Speech is normal.  No suicidal ideation.  No hallucinations.    Results for orders placed or performed in visit on 05/02/17   Comprehensive Metabolic Panel   Result Value Ref Range    Sodium 127 (L) 136 - 145 mmol/L    Potassium 5.1 (H) 3.5 - 5.0 mmol/L    Chloride 94 (L) 98 - 107 mmol/L    CO2 27 22 - 31 mmol/L    Anion Gap, Calculation 6 5 - 18 mmol/L    Glucose 86 70 - 125 mg/dL    BUN 12 8 - 22 mg/dL    Creatinine 0.65 0.60 - 1.10 mg/dL    GFR MDRD Af Amer >60 >60 mL/min/1.73m2    GFR MDRD Non Af Amer >60 >60 mL/min/1.73m2    Bilirubin, Total 0.4 0.0 - 1.0 mg/dL    Calcium 8.6 8.5 - 10.5 mg/dL    Protein, Total 6.6 6.0 - 8.0 g/dL    Albumin 3.1 (L) 3.5 - 5.0 g/dL    Alkaline Phosphatase 75 45 - 120 U/L    AST 17 0 - 40 U/L    ALT 10 0 - 45 U/L      Lab Results   Component Value Date    CHOL 204 (H) 05/02/2017     Lab Results   Component Value Date    HDL 55 05/02/2017     Lab Results   Component Value Date    LDLCALC 131 (H) 05/02/2017     Lab Results   Component Value Date    TRIG 88 05/02/2017     No components found for: TASNEEM Fallon was seen today for follow-up, depression and immunizations.    Diagnoses and all orders for this visit:    Major depression, recurrent - We discussed a holistic approach to depression.  I think some exercise would be helpful- gentle yoga or even walking.  I recommend taking some core supplements to support her endogenous neurotransmitters.  We also talked about finding meaning in life.  I think she should spend some time exploring her spirituality.  We discussed a retreat and I gave her the name of some [facility] orth[facility] retreat centers nearby.  She is interested in this.    Immunization due  -     Pneumococcal conjugate vaccine 13-valent 6wks-17yrs; >50yrs    Hypercholesterolemia-LDL slightly high.  We discussed a trial of plant stanols / increasing fiber/ artichoke extract.  I gave her handout on nonpharmacologic ways to lower LDL.  Recheck in 1 year.    Hyponatremia- I wonder  if this is due to the No -Salt supplement as her K is a little elevated.  I am going to have her stop this and use Himalayan salt.  Recheck sodium in 1 month.    Patient Instructions   To order supplements go to the web site: [employer]  Use my authorization number to order the recommended supplements: [id number]    ProThera VitaPrime Iron-free multivitamins with minerals 1 twice daily    Marine fish oil 1 capsule      Stop the No-Salt    Use Himalayan pink salt    Follow-up in 1 month        25 minutes spent with the patient.  Over 50% were spent in counseling and coordination of care.    Renée Horne

## 2021-06-11 NOTE — PROGRESS NOTES
I have requested that the [facility]  staff send the patient a letter stating we will be closing the patient's psychotherapy file due to inactivity. If the patient does not respond to the letter within two weeks, I will consider the patient's psychotherapy file closed due to lack of follow-up.  At close of treatment, the patient's condition was unchanged due to lack of follow-through. Patient last seen 3/29/17.    The patient was informed in the letter that I am available to re-open the patient's case at any time and I can provide a list of resources of other psychotherapists for the patient if needed.

## 2021-06-11 NOTE — PROGRESS NOTES
Dear Dr. Horne ,  Your patient, Leeanne Duarte was seen today for management of osteoporosis.  The last bone density scan was done on 8/2016 outside of [facility]:  Left femoral neck T score -1.6, left total hip T score -1.6 and forearm T score -1.5.  Patient was treated in the past with Reclast infusion, once last year.  She experienced severe muscle aches and pains for the few days after the infusion.    Social history:  reports that she has never smoked. She has never used smokeless tobacco. She reports that she does not drink alcohol or use illicit drugs.    Past medical history:   Patient Active Problem List   Diagnosis     Vitamin D deficiency     Wedge compression fracture of unspecified thoracic vertebra, sequela     Low back pain with sciatica     Raynaud's disease     Primary insomnia     Inflammatory autoimmune disorder     Anxiety, generalized     Pain in thoracic spine     Major depression, recurrent     Tubular adenoma of colon     Hyponatremia     Fracture of eleventh thoracic vertebra     Osteoporosis     History of fractures: T 11 compression fracture       Family History   Problem Relation Age of Onset     Hypertension Mother      Acute Myocardial Infarction Mother      Hyperlipidemia Mother      Alcohol abuse Mother      Arthritis Mother      Depression Mother      Hypertension Father      Acute Myocardial Infarction Father      Hyperlipidemia Father      Alcohol abuse Father      Prostate cancer Father      Diabetes Sister      Alcohol abuse Sister      Colon cancer Maternal Grandfather      Heart disease Paternal Uncle      Alcohol abuse Paternal Uncle      Colon cancer Maternal Grandmother      Arthritis Maternal Grandmother      Alcohol abuse Brother      Dementia Brother      No Medical Problems Son      Alcohol abuse Maternal Uncle      Alcohol abuse Paternal Aunt        Diet and supplements: Ca citrate 4 pils daily + 4 cups milk/day    Risk medications: none    Gynecologic history: menopause  age 44, on Premarin for 15 years at least    Laboratory testing:   Component      Latest Ref Rng & Units 5/2/2017   Sodium      136 - 145 mmol/L 127 (L)   Potassium      3.5 - 5.0 mmol/L 5.1 (H)   Chloride      98 - 107 mmol/L 94 (L)   CO2      22 - 31 mmol/L 27   Anion Gap, Calculation      5 - 18 mmol/L 6   Glucose      70 - 125 mg/dL 86   BUN      8 - 22 mg/dL 12   Creatinine      0.60 - 1.10 mg/dL 0.65   GFR MDRD Af Amer      >60 mL/min/1.73m2 >60   GFR MDRD Non Af Amer      >60 mL/min/1.73m2 >60   Bilirubin, Total      0.0 - 1.0 mg/dL 0.4   Calcium      8.5 - 10.5 mg/dL 8.6   Protein, Total      6.0 - 8.0 g/dL 6.6   ALBUMIN      3.5 - 5.0 g/dL 3.1 (L)   Alkaline Phosphatase      45 - 120 U/L 75   AST      0 - 40 U/L 17   ALT      0 - 45 U/L 10   Vitamin D, Total (25-Hydroxy)      30.0 - 80.0 ng/mL 70.6       ROS:     Constitutional: Negative.    HENT: Negative.    Eyes: Negative.    Respiratory: Negative.    Cardiovascular: Negative.    Gastrointestinal: Negative.    Endocrine: Negative.    Genitourinary: Negative.    Musculoskeletal: Negative.    Skin: Negative.    Allergic/Immunologic: Negative.    Neurological: Negative.    Hematological: Negative.    Psychiatric/Behavioral: Negative.      PE:  BP 90/50  Pulse 64  Wt (!) 97 lb 6.4 oz (44.2 kg)  BMI 19.51 kg/m2  Constitutional:  oriented to person, place, and time, appears well-nourished. No distress.   HENT:   Head: Normocephalic.   Mouth/Throat: Oropharynx is clear and moist.   Eyes: Conjunctivae are normal. Pupils are equal, round, and reactive to light.   Neck: Normal range of motion. Neck supple.   Cardiovascular: Normal rate, regular rhythm and normal heart sounds.    Pulmonary/Chest: Effort normal and breath sounds normal.  Abdominal: Soft. Bowel sounds are normal.   Musculoskeletal: Normal range of motion.   Neurological: alert and oriented to person, place, and time.   Skin: Skin is warm.   Psychiatric: normal mood and affect.         Impression:    1. Fracture of eleventh thoracic vertebra  Electrophoresis, Protein, Random Urine Ccade    Electrophoresis, Protein, Serum, Cascade    Celiac(Gluten)Antibody Panel ($$$)    Parathyroid Hormone Intact with Minerals    Bone Alkaline Phosphatase    Calcium, 24 Hour Urine    Thyroid Stimulating Hormone (TSH)    Magnesium    DXA Bone Density Scan   2. Osteoporosis  Electrophoresis, Protein, Random Urine Ccade    Electrophoresis, Protein, Serum, Cascade    Celiac(Gluten)Antibody Panel ($$$)    Parathyroid Hormone Intact with Minerals    Bone Alkaline Phosphatase    Calcium, 24 Hour Urine    Thyroid Stimulating Hormone (TSH)    Magnesium    DXA Bone Density Scan       Plan:  1. The patient will take 1200 - 1500 mg of calcium daily from the diet and supplements.  2. The patient will continue same vit D supplements. I will check SPEP, 24 h urine calcium, PTH,TSH, celiac panel.  3. Treatment options discussed with the patient. She will be a good candidate for Prolia and will wait for insurance approval,  4. I am asking for follow up in 1 year  5. I strongly recommended weaning off the estrogen. I was concerned that she had unopposed estrogen treatment for all these years and she said that Dr Horne expressed the same concern.    Patient Instructions   Prolia 1st today.  Prolia 2nd in 6 months with my nurse. I will see you in 1 year.  DXA in 1 year.   Phone number to schedule [phone].  Please avoid any extensive dental work as implants and teeth extractions for the next 1-2 months.  Daily calcium need is 9686-2552 mg a day from the diet and supplements.  Calcium citrate is easier to digest.  Vitamin D 2000 IU daily recommended.        Thank you for the opportunity to participate in the care of your patient. If you have any additional questions, do not hesitate to contact me at [facility].    This note has been dictated using voice recognition software. Any grammatical or context distortions are  unintentional and inherent to the software      With best personal regards,   Rudy Cisneros MD, CCD  6/27/2017

## 2021-06-11 NOTE — PROGRESS NOTES
Admission History & Physical  Leeanne Duarte, 1950, [id number]    [facility]  No Primary Care Provider, None    Extended Emergency Contact Information  Primary Emergency Contact: lOe Duarte   [place]  Mobile Phone: [phone]  Relation: Spouse     Assessment and Plan:   Assessment: Tyloma left great toe  Plan: I recommended the patient padded the area to protect the left great toe.  Active Problems:    * No active hospital problems. *      Chief Complaint:  wart left great toe      HPI:    Leeanne Duarte is a 67 y.o. old female who presented to the clinic today complaining of a small lesion on the bottom of her left great toe.  The patient indicated that she has had a history of warts which were successfully treated in the past with cryotherapy.  She stated that she has this lesion on her left great toe for the past several months.  She has been applying salicylic acid.  She was not quite sure if it was a wart.  She does not have much discomfort.  History is provided by patient    Medical History  Active Ambulatory (Non-Hospital) Problems    Diagnosis     Hyponatremia     Major depression, recurrent     Tubular adenoma of colon     Pain in thoracic spine     Wedge compression fracture of unspecified thoracic vertebra, sequela     Osteopenia     Low back pain with sciatica     Raynaud's disease     Primary insomnia     Inflammatory autoimmune disorder     Vitamin D deficiency     Anxiety, generalized     Past Medical History:   Diagnosis Date     Anxiety      Depression      Osteoporosis      Patient Active Problem List    Diagnosis Date Noted     Hyponatremia 05/16/2017     Major depression, recurrent 05/03/2017     Tubular adenoma of colon 05/03/2017     Pain in thoracic spine 07/26/2016     Wedge compression fracture of unspecified thoracic vertebra, sequela 06/30/2016     Osteopenia 06/30/2016     Low back pain with sciatica 03/21/2016     Raynaud's disease  03/21/2016     Primary insomnia 03/21/2016     Inflammatory autoimmune disorder 03/21/2016     Vitamin D deficiency 03/16/2015     Anxiety, generalized 01/12/2010     Surgical History  She  has a past surgical history that includes HAMSTRING SURGERY; Kidney stone surgery; and Cataract extraction, bilateral (2014).   Past Surgical History:   Procedure Laterality Date     CATARACT EXTRACTION, BILATERAL  2014     HAMSTRING SURGERY       KIDNEY STONE SURGERY      Social History  Reviewed, and she  reports that she has never smoked. She has never used smokeless tobacco. She reports that she does not drink alcohol or use illicit drugs.  Social History   Substance Use Topics     Smoking status: Never Smoker     Smokeless tobacco: Never Used     Alcohol use No      Allergies  Allergies   Allergen Reactions     Chocolate Other (See Comments)     Orange Other (See Comments)    Family History  Reviewed, and family history includes Acute Myocardial Infarction in her father and mother; Alcohol abuse in her brother, father, maternal uncle, mother, paternal aunt, paternal uncle, and sister; Arthritis in her maternal grandmother and mother; Colon cancer in her maternal grandfather and maternal grandmother; Dementia in her brother; Depression in her mother; Diabetes in her sister; Heart disease in her paternal uncle; Hyperlipidemia in her father and mother; Hypertension in her father and mother; No Medical Problems in her son; Prostate cancer in her father.   Psychosocial Needs  Social History     Social History Narrative     Additional psychosocial needs reviewed per nursing assessment.       Prior to Admission Medications     (Not in a hospital admission)        Review of Systems - Negative    /60  Pulse 70  SpO2 99%    Objective findings: General: The patient is alert and in no acute distress      Integument: Nails bilateral feet are normal growth and color. Skin bilateral feet warm and intact.  There is a small round  nucleated hyperkeratotic lesion on the plantar aspect of the left great toe.  There is no pinpoint bleeding noted at the base of this lesion.  There is no pain on palpation of this lesion.        Neurologic: Negative clonus, negative Babinski bilaterally.      Musculoskeletal: Range of motion within normal limits bilaterally. Muscle power +5/5 bilaterally in all compartments.        Assessment:  Tyloma left great toe      Plan: I informed the patient that she does not have any signs of a wart on the bottom of her left great toe.  I do believe is only a small callus.  I recommended she cover the area with moleskin to protect the skin while she is active.  She is to return to the clinic as needed.

## 2021-06-11 NOTE — PROGRESS NOTES
SUBJECTIVE: Leeanne Duarte is a 67 y.o. female with:  Chief Complaint   Patient presents with     Follow-up     sodium     Hip Pain     left side, hurtting a lot time, more acute now x 2 weeks, worst when standing and or waking, sharp pain 2/10    1) Depression - Her Lexapro was increased and she feels kind of foggy in the brain. Her appetite has increased. When she falls asleep she wants to be in bed forever.  Feels she does not have strength to get up.  She has been going to yoga at [place] and likes it.  She has been watching some HAILEE talks on finding meaning later in life.  She is interested in exploring yoga in more depth and is thinking about taking course at [employer].      2) Hyponatremia- She has been increasing salt in her diet but feels a little puffy.  She stopped using the potassium salt and is using pink Himalayan salt. She has a history of low sodium and was noted to be using a potassium salt substitute previously.    3) Hip pain - She has had pain in the left buttock.  It has gotten worse in last few weeks.  Worse when going from sitting to walking.  Does not bother her when she is just standing.  Hurts to walk.  No leg pain/ numbness/tingling or weakness in leg. This has been present for years so she does not feel that yoga made it worse.  She does have history of T11 fracture with lumbar pain.    SH: Lives with partner.  Retired .    Patient Active Problem List   Diagnosis     Vitamin D deficiency     Wedge compression fracture of unspecified thoracic vertebra, sequela     Low back pain with sciatica     Raynaud's disease     Primary insomnia     Osteopenia     Inflammatory autoimmune disorder     Anxiety, generalized     Pain in thoracic spine     Major depression, recurrent     Tubular adenoma of colon     Hyponatremia      OBJECTIVE: /60 (Patient Site: Left Arm, Patient Position: Sitting, Cuff Size: Adult Small)  Pulse 72  Resp 8  Wt 100 lb (45.4 kg) Comment: with shoes   Breastfeeding? No  BMI 20.03 kg/m2 no distress  Back: No tenderness over spine.  Tender in left buttock.  Decreased flexion/ extension.  Her straight leg-raise is negative.  Hips: Tender over left greater trochanter.  Good range of motion of the hips.  lower extremities: DTRs symmetric.  Normal strength.  PSYCH:  Awake, alert, and oriented x 3.  Mood and affect are appropriate.  Good eye contact.  Speech is normal.  No suicidal ideation.  No hallucinations.    Results for orders placed or performed in visit on 05/02/17   Comprehensive Metabolic Panel   Result Value Ref Range    Sodium 127 (L) 136 - 145 mmol/L    Potassium 5.1 (H) 3.5 - 5.0 mmol/L    Chloride 94 (L) 98 - 107 mmol/L    CO2 27 22 - 31 mmol/L    Anion Gap, Calculation 6 5 - 18 mmol/L    Glucose 86 70 - 125 mg/dL    BUN 12 8 - 22 mg/dL    Creatinine 0.65 0.60 - 1.10 mg/dL    GFR MDRD Af Amer >60 >60 mL/min/1.73m2    GFR MDRD Non Af Amer >60 >60 mL/min/1.73m2    Bilirubin, Total 0.4 0.0 - 1.0 mg/dL    Calcium 8.6 8.5 - 10.5 mg/dL    Protein, Total 6.6 6.0 - 8.0 g/dL    Albumin 3.1 (L) 3.5 - 5.0 g/dL    Alkaline Phosphatase 75 45 - 120 U/L    AST 17 0 - 40 U/L    ALT 10 0 - 45 U/L      Leeanne was seen today for follow-up and hip pain.    Diagnoses and all orders for this visit:    Hyponatremia- Recheck BMP.  -     Basic Metabolic Panel    Major depression, recurrent - I think she needs to lower her Lexapro dose and she is going to speak with her psychiatrist about that.  I discussed the practice of yoga including breath work/ meditation.  I think it would be helpful for her to study the discipline a little deeper.  She does need to be careful doing any exercises given her thoracic fracture.    Left hip pain- I suspect combination of trochanteric bursitis/ piriformis syndrome.  -     Ambulatory referral to Physical Therapy      Renée Hrone

## 2021-06-12 NOTE — PROGRESS NOTES
Chief Complaint   Patient presents with     PROLIA #1     1. Did you experience any problems with previous Prolia injection? FIRST ONE TODAY   2. Do you feel sick today?(fever, RS, GI,  issues)? NO  3. Any medication changes in the last 6 months? NO  4. Did you take prednisone or other immunosuppressant drugs in the last 6 months?(chemo, transplant, rheu, dermatology conditions)? NO  5. Did you have any serious infection in the last 6 months? (pancreatitis) NO  6. Any recent hospitalizations/ surgeries (especially gastric bypass, thyroid, parathyroid)? NO  7. Do you plan any dental work?(especially implants and extractions) in the next 2-3 months? NO  8. Did you have any fractures in the last year? NO    Scheduled  next 6 months Prolia injection with a nurse visit.     Maday Mccarthy, [facility] 7/20/2017 4:14 PM

## 2021-06-12 NOTE — PROGRESS NOTES
SUBJECTIVE: Leeanne Duarte is a 67 y.o. female with:  Chief Complaint   Patient presents with     Cerumen Impaction     pt states left ear wax build up, wants ears cleaned     1) Depression - She has been going to yoga 2x a day at [place].  Her back pain is much improved. She feels happier.  She is learning how to stand/ sit with proper alignment.  Her sleep is better. She is planning to take 2 courses at the [employer] this fall. She is meeting people and feels more connected.    2) Osteoporosis- She is getting Prolia injections.  She cut back on calcium. Her endocrinologist has also endorsed her yoga practice.    3) She complains of a blocked feeling in the left ear. No ear pain. She has tried using q-tips but no relief.  She does not have URI/ allergies.    OBJECTIVE: BP 92/52 (Patient Site: Left Arm, Patient Position: Sitting, Cuff Size: Adult Regular)  Pulse 70  Resp 16  Wt (!) 95 lb 8 oz (43.3 kg)  SpO2 99%  Breastfeeding? No  BMI 19.13 kg/m2 no distress  Ears: Right canal - moderate amount dry wax but I can see tympanic membrane.  Left canal - filled with dry cerumen.  PSYCH:  Awake, alert, and oriented x 3.  Mood and affect are appropriate.  Good eye contact.  Speech is normal.  No suicidal ideation.  No hallucinations.    Leeanne was seen today for cerumen impaction.    Diagnoses and all orders for this visit:    Cerumen impaction- Left ear was lavaged with removal of some wax.  She can use Debrox OTC in the future if she has recurrent problems.    Major depression, recurrent - She is doing much better with yoga/ mindfulness and connecting with others.    Renée Horne

## 2021-06-15 NOTE — PROGRESS NOTES
"Prolia Injection Phone Screen      Screening questions have been asked 2-3 days prior to administration visit for Prolia. If any questions are answered with \"Yes,\" this phone encounter were will routed to ordering provider for further evaluation.     1.  When was the last injection?  8/11/20    2.  Has insurance for this injection been verified?  Yes    3.  Did you experience any new onset achiness or rashes that lasted for over a month with your previous Prolia injection?   No    4.  Do you have a fever over 101?F or a new deep cough that is unusual for you today? No    5.  Have you started any new medications in the last 6 months that you were told could affect your immune system? These may have been prescribed by oncologist, transplant, rheumatology, or dermatology.   No    6.  In the last 6 months have you have gastric bypass or parathyroid surgery?   No    7.  Do you plan dental work requiring drilling into the bone such as implants/extractions or oral surgery in the next 2-3 months?   No    8. Do you have new insurance since the last injection? No     Patient informed if symptoms discussed above present prior to their administration appointment, they are to notify clinic immediately.     Navid Faulkner      The following steps were completed to comply with the REMS program for Prolia:  1. Ordering provider has previously reviewed information in the Medication Guide and Patient Counseling Chart, including the serious risks of Prolia  and the symptoms of each risk and have been advised to seek prompt medical attention if they have signs or symptoms of any of the serious risks.  2. Provided each patient a copy of the Medication Guide and Patient Brochure.  See MAR for administration details.   Indication: Prolia  (denosumab) is a prescription medicine used to treat osteoporosis in patients who:   Are at high risk for fracture, meaning patients who have had a fracture related to osteoporosis, or who have multiple " risk factors for fracture; Cannot use another osteoporosis medicine or other osteoporosis medicines did not work well.   The timeline for early/late injections would be 4 weeks early and any time after the 6 month marisel. If a patient receives their injection late, then the subsequent injection would be 6 months from the date that they actually received the injection    Have the screening questions been asked prior to this administration? Yes

## 2021-06-15 NOTE — PROGRESS NOTES
Chief Complaint   Patient presents with     Prolia #2     1. Did you experience any problems with previous Prolia injection? NO  2. Do you feel sick today?(fever, RS, GI,  issues)? NO  3. Any medication changes in the last 6 months? NO  4. Did you take prednisone or other immunosuppressant drugs in the last 6 months?(chemo, transplant, rheu, dermatology conditions)? NO  5. Did you have any serious infection in the last 6 months? (pancreatitis) NO  6. Any recent hospitalizations/ surgeries (especially gastric bypass, thyroid, parathyroid)? NO  7. Do you plan any dental work?(especially implants and extractions) in the next 2-3 months? NO  8. Did you have any fractures in the last year? NO    Osteo number is given to the patient. Informed patient schedule for next 6 months Prolia injection with Dr. Cisneros and Dexs scan . Patient verbalized understanding     Maday Mccarthy CMA [facility] 1/23/2018 3:41 PM

## 2021-06-15 NOTE — TELEPHONE ENCOUNTER
Patient is wondering if patient needs to recheck Vitamin D levels. Patient is in clinic today for Prolia injection. Please place orders if appropriate.

## 2021-06-15 NOTE — TELEPHONE ENCOUNTER
See result message from Dr. Cisneros:     Call the pt. Very high vit D. She should stop taking all supplements that have vit D for 3 months. Recheck vit D level in 3 months with PCP.    Patient notified and will follow these instructions.  Lynsey Macias CMA ............... 2:21 PM, 02/12/21

## 2021-06-15 NOTE — TELEPHONE ENCOUNTER
If patient is taking any vitamin D supplements or any calcium containing vitamin D, they should be held until she can discuss this result with Dr Cisneros

## 2021-06-15 NOTE — TELEPHONE ENCOUNTER
----- Message from Rudy Cisneros MD sent at 2/12/2021  2:17 PM CST -----  Call the pt. Very high vit D. She should stop taking all supplements that have vit D for 3 months. Recheck vit D level in 3 months with PCP.

## 2021-06-16 NOTE — PROGRESS NOTES
Assessment:        Acute Bronchitis       Plan:       Antitussives per orders  Recommended plenty of fluids  OTC analgesics discussed  Discussed signs of worsening infection and when to follow-up with PCP if no symptom improvement.  Discussed possibility of using nasal steroid and albuterol inhaler for patient's symptoms, but patient denied at this time.       Patient Instructions   You were seen today for acute bronchitis. This is likely due to a viral illness.    Symptom management:  - Get plenty of rest  - Avoid smoking and second hand smoke  - May take tylenol or ibuprofen for fever/discomfort  - Drink plenty of non-caffeinated fluids  - May use tessalon perles to help suppress the cough    Reasons to be seen in the emergency room:  - Develop a fever of 100.4 or higher  - Cough changes, coughing up blood, or become short of breath  - Neck stiffness  - Chest pain  - Severe headache  - Unable to tolerate eating or drinking fluids    Otherwise, if no symptom improvement after 5 days, follow-up with your primary care provider.      Subjective:        Leeanne Duarte is a 67 y.o. female here for evaluation of a cough. The cough is productive of clear phlegm with chest tightness. Onset of symptoms was 4 days ago, gradually worsening since that time.  Associated symptoms include rhinorrhea and headache. Patient does not have a history of asthma. Patient has not had recent travel. Patient does not have a history of smoking. Patient denies fever, sore throat, and ear pain. Treatment has included tylenol with last dose taken 2 days ago.     The following portions of the patient's history were reviewed and updated as appropriate: allergies, current medications and problem list.    Review of Systems  Pertinent items are noted in HPI.     Allergies  Allergies   Allergen Reactions     Chocolate Other (See Comments)     Orange Other (See Comments)          Objective:       /80 (Patient Site: Right Arm, Patient  Position: Sitting, Cuff Size: Adult Regular)  Pulse 84  Temp 98  F (36.7  C) (Oral)   Resp 15  Wt (!) 99 lb (44.9 kg)  SpO2 98%  Breastfeeding? No  BMI 19.83 kg/m2  General appearance: alert, appears stated age, cooperative, no distress and non-toxic  Head: Normocephalic, without obvious abnormality, atraumatic  Ears: TM's intact with mucoid fluid, no erythema or bulging; external ears normal  Nose: clear discharge  Throat: post-nasal drip present; no tonsil swelling, erythema, or exudate; MMM, lips and tongue normal  Neck: no adenopathy and supple, symmetrical, trachea midline  Lungs: rhonchi in the left lower lobe, no wheezing  Heart: regular rate and rhythm, S1, S2 normal, no murmur, click, rub or gallop    Imaging    Xr Chest 2 Views    Result Date: 3/15/2018  EXAM DATE:         03/15/2018 [facility] X-RAY CHEST, 2 VIEWS, FRONTAL AND LATERAL 3/15/2018 4:45 PM INDICATION: worsening cough with sputum production; rhonchi he COMPARISON: 03/06/2017 thoracic spine FINDINGS: Stable lower thoracic vertebral body compression fracture. Thoracolumbar scoliosis. Heart size appears normal. Lungs appear clear.     I personally reviewed and discussed findings with the patient.

## 2021-06-17 NOTE — PROGRESS NOTES
"Assessment/Plan:    Major depression, recurrent (H)  Rx through [facility] (Dr. Stanton in [place]).    Fracture of eleventh thoracic vertebra (H)  No symptoms.  Doing well.     Osteoporosis  Patient is curious what her vitamin D level is.  She was measured back in February and it was found to be in the toxic levels.  She discontinued all supplementation since then and comes for lab draw.  No concerns about spine health today.  Tolerant of Prolia.  -She will follow up with her osteoporosis provider and she was scheduled for an annual wellness visit towards the end of summer.  Check vitamin D.  Future ordered placed for recheck in 3 to 6 months after she resumes supplementation.  We reviewed that Dr. Cisneros recommended 2000 IU/day.    Return in about 3 months (around 8/7/2021) for Annual physical with Dr. Horne.    Abraham Senior MD  _______________________________    Chief Complaint   Patient presents with     Follow-up     vitamin D levels from 02/11/2021     Subjective: Leeanne Duarte is a 71 y.o. year old female who returns to clinic for the following chronic complaints/concerns:     Vit d:   - on prolia through Dr. Cisneros.  She had been taking supplements at the time of the measurement in February.  She estimates 10,000-15,000 IU/day.  She otherwise feels well.  No concerns about her spine.  -Clarified who is prescribing her Escitalopram.  This is through psychiatrist in [place].  No concerns there today.    Review of systems is negative except for as shown in the HPI.    The following portions of the patient's history were reviewed and updated as appropriate: allergies, current medications, past medical history and problem list.    Objective:    height is 4' 11.5\" (1.511 m) and weight is 98 lb 4.8 oz (44.6 kg) (abnormal). Her oral temperature is 98  F (36.7  C). Her blood pressure is 104/60 and her pulse is 68. Her respiration is 18 and oxygen saturation is 100%.   Physical " Exam  Constitutional:       General: She is not in acute distress.     Appearance: Normal appearance.   HENT:      Head: Normocephalic and atraumatic.   Eyes:      General: No scleral icterus.     Conjunctiva/sclera: Conjunctivae normal.   Pulmonary:      Effort: Pulmonary effort is normal.   Skin:     Findings: No rash.   Neurological:      General: No focal deficit present.      Mental Status: She is alert and oriented to person, place, and time.   Psychiatric:         Mood and Affect: Mood normal.         Behavior: Behavior normal.         No data recorded  No data recorded  No data recorded  No data recorded    No results found for this or any previous visit (from the past 24 hour(s)).      This note has been dictated using voice recognition software. Any grammatical or context distortions are unintentional and inherent to the software

## 2021-06-19 NOTE — PROGRESS NOTES
1. Osteoporosis  DXA Bone Density Scan   2. Wedge compression fracture of unspecified thoracic vertebra, sequela         Return in about 6 months (around 1/24/2019) for Recheck.    Patient Instructions   Prolia 3rd today.  Prolia 4th in 6 months with my nurse. I will see you in 1 year.    DXA in 2 years .   Phone number to schedule [phone].    Daily calcium need is 4242-1414 mg a day from the diet and supplements.  Calcium citrate is easier to digest.  Vitamin D 2000 IU daily recommended.      Chief Complaint   Patient presents with     Osteoporosis Follow Up       BP (!) 84/54 (Patient Site: Right Arm, Patient Position: Sitting, Cuff Size: Adult Regular)  Pulse 80  Wt (!) 95 lb (43.1 kg)  BMI 19.03 kg/m2      Did you experience any problems with previous Prolia injection? no  Any medication change in the last 6 months? no  Did you take prednisone or other immunosupressant drugs in the last 6 months   (chemo, transplant, rheum, dermatology conditions)? no  Did you have any serious infection in the last 6 months?no  Any recent hospitalizations?no  Do you plan any dental work in the next 2-3 months?no  How much calcium do you take daily from the diet and supplements?1200 mg  How much vit D do you take daily? 1000 IU  Last DXA? Done today, discussed and reviewed      Patient is here today for the 3rd Prolia injection. Patient tolerated previous injections well.   We discussed calcium and vit D daily needs today.   Next Prolia injection will be in 6 months.     15 minutes spent with the patient and more then 50 % of the time in counseling.  This note has been dictated using voice recognition software. Any grammatical or context distortions are unintentional and inherent to the software      Patient Active Problem List   Diagnosis     Vitamin D deficiency     Wedge compression fracture of unspecified thoracic vertebra, sequela     Low back pain with sciatica     Raynaud's disease     Primary insomnia      Inflammatory autoimmune disorder (H)     Anxiety, generalized     Pain in thoracic spine     Major depression, recurrent (H)     Tubular adenoma of colon     Hyponatremia     Fracture of eleventh thoracic vertebra (H)     Osteoporosis       Current Outpatient Prescriptions on File Prior to Visit   Medication Sig Dispense Refill     calcium citrate-vitamin D3 (CITRACAL + D) 315-250 mg-unit per tablet Take 2 tablets by mouth daily.        escitalopram oxalate (LEXAPRO) 5 MG tablet taking 10 mg daily  2     ibuprofen 200 mg cap Take 2 tablets by mouth daily as needed.        LORazepam (ATIVAN) 0.5 MG tablet Take 1 mg by mouth daily.        albuterol (PROAIR HFA;PROVENTIL HFA;VENTOLIN HFA) 90 mcg/actuation inhaler Inhale 2 puffs every 6 (six) hours as needed for wheezing. Use with spacer. 1 each 0     fluticasone (FLONASE) 50 mcg/actuation nasal spray 2 sprays into each nostril daily. 18 g 2     inhalational spacing device Spcr Use with MDI. Pharmacist advised use 1 each 0     No current facility-administered medications on file prior to visit.

## 2021-06-23 NOTE — PROGRESS NOTES
"Prolia Injection Phone Screen      Screening questions have been asked 2-3 days prior to administration visit for Prolia. If any questions are answered with \"Yes,\" this phone encounter were will routed to ordering provider for further evaluation.         3.  Did you experience any new onset achiness or rashes that lasted for over a month with your previous Prolia injection?   No    4.  Do you have a fever over 101?F or a new deep cough that is unusual for you today? No    5.  Have you started any new medications in the last 6 months that you were told could affect your immune system? These may have been prescribed by oncologist, transplant, rheumatology, or dermatology.   No    6.  In the last 6 months have you have gastric bypass or parathyroid surgery?   No    7.  Do you plan dental work requiring drilling into the bone such as implants/extractions or oral surgery in the next 2-3 months?   No    8. Do you have new insurance since the last injection?    Patient informed if symptoms discussed above present prior to their administration appointment, they are to notify clinic immediately.     Delia Agustin     The following steps were completed to comply with the REMS program for Prolia:  1. Ordering provider has previously reviewed information in the Medication Guide and Patient Counseling Chart, including the serious risks of Prolia  and the symptoms of each risk and have been advised to seek prompt medical attention if they have signs or symptoms of any of the serious risks.  2. Provided each patient a copy of the Medication Guide and Patient Brochure.  See MAR for administration details.   Indication: Prolia  (denosumab) is a prescription medicine used to treat osteoporosis in patients who:   Are at high risk for fracture, meaning patients who have had a fracture related to osteoporosis, or who have multiple risk factors for fracture; Cannot use another osteoporosis medicine or other osteoporosis medicines " did not work well.   The timeline for early/late injections would be 4 weeks early and any time after the 6 month marisel. If a patient receives their injection late, then the subsequent injection would be 6 months from the date that they actually received the injection    Have the screening questions been asked prior to this administration? Yes     Pt reminded that her next Prolia injection will be with Dr. Cisneros for her yearly osteo follow up.

## 2021-06-26 NOTE — PROGRESS NOTES
Progress Notes by Milton Sigala PA-C at 3/22/2018  2:10 PM     Author: Milton Sigala PA-C Service: -- Author Type: Physician Assistant    Filed: 3/22/2018  4:43 PM Encounter Date: 3/22/2018 Status: Signed    : Milton Sigala PA-C (Physician Assistant)       Subjective:      Patient ID: Leeanne Duarte is a 67 y.o. female.    Chief Complaint:    HPI  Leeanne Duarte is a 67 y.o. female who presents today complaining of to Nute cough.  Patient was seen in urgent care on 3/15/2018 and diagnosed with bronchitis and treated with symptomatic care.  Instructed to return to clinic if she did not get good resolution or if any complication or sequela should arise.  She is complaining that she still has some shortness of breath.  Upon further questioning she does not have dyspnea on exertion or PND.  However she does feel that she has continued cough that is causing her to be short of breath.  She has no overt chest pain or pleuritic chest pain.  Other signs or symptoms of PE.  She denies other symptoms of pneumonia to include fever chills or night sweats.  Her temperature in the office is currently 99.1 pulse is 88 respirations are 22 and pulse ox is 96% on room air.  Did have a chest x-ray at the last office encounter which was read as negative.  Is a dental note was made of a compression fracture that is old and unchanged.    She has no other complaints to address in the office encounter today.    Past Medical History:   Diagnosis Date   ? Anxiety    ? Depression    ? Osteoporosis        Past Surgical History:   Procedure Laterality Date   ? CATARACT EXTRACTION, BILATERAL  2014   ? HAMSTRING SURGERY     ? KIDNEY STONE SURGERY         Family History   Problem Relation Age of Onset   ? Hypertension Mother    ? Acute Myocardial Infarction Mother    ? Hyperlipidemia Mother    ? Alcohol abuse Mother    ? Arthritis Mother    ? Depression Mother    ? Hypertension Father    ? Acute Myocardial Infarction Father    ?  Hyperlipidemia Father    ? Alcohol abuse Father    ? Prostate cancer Father    ? Diabetes Sister    ? Alcohol abuse Sister    ? Colon cancer Maternal Grandfather    ? Heart disease Paternal Uncle    ? Alcohol abuse Paternal Uncle    ? Colon cancer Maternal Grandmother    ? Arthritis Maternal Grandmother    ? Alcohol abuse Brother    ? Dementia Brother    ? No Medical Problems Son    ? Alcohol abuse Maternal Uncle    ? Alcohol abuse Paternal Aunt        Social History   Substance Use Topics   ? Smoking status: Never Smoker   ? Smokeless tobacco: Never Used   ? Alcohol use No       Review of Systems  As above in HPI, otherwise negative.    Objective:     BP 90/50 (Patient Site: Right Arm, Patient Position: Sitting, Cuff Size: Adult Small)  Pulse 88  Temp 99.1  F (37.3  C) (Oral)   Resp 22  Wt (!) 97 lb 1.6 oz (44 kg)  SpO2 96%  BMI 19.45 kg/m2    Physical Exam  General: Patient is resting comfortably no acute distress is afebrile  HEENT: Head is normocephalic atraumatic eyes are PERRLA EOMI sclera anicteric   TMs are clear bilaterally  Throat is clear without exudate  No cervical lymphadenopathy present  Lungs: Clear to auscultation on the right side the left lower lobe has crackles that are best heard on expiration.  Heart: Regular rate and rhythm  Skin: Without rash non-diaphoretic      Assessment:     Procedures    The encounter diagnosis was Bronchitis.    Plan:     1. Bronchitis  albuterol (PROAIR HFA;PROVENTIL HFA;VENTOLIN HFA) 90 mcg/actuation inhaler    inhalational spacing device Spcr    doxycycline (MONODOX) 100 MG capsule    Nursing communication       We had a long conversation regarding the patient's treatment today.  We did talk about having a reactive irritative lung surface with bronchitis.  The treatment for bronchitis is not an antibiotic but rather treatment for cough.  However I will add an antibiotic for anti-inflammatory effect and since she has had the symptoms for quite some time and not  feeling improvement.  So suggested use of the albuterol inhaler to help with congestion and bronchospasm.  She does have some component of anxiety and I advised her to use this only up to 4 PM in the afternoon also does not keep her awake.  Also use over-the-counter Robitussin Delsym or Vicks for cough.    Patient declined use of prednisone said that she does not tolerate that medication well.  She did offer try the albuterol but I did caution her regarding activation and possible akathisia or other jitteriness with the central nervous system activation with the albuterol.  He voiced understanding of that concern.  She will follow-up with her primary care provider she is not getting good resolution or if there is other complication or sequela    Patient Instructions     You were seen today for acute bronchitis. This is likely due to a viral illness.    Symptom management:  - Get plenty of rest  - Avoid smoking and second hand smoke  - May take tylenol or ibuprofen for fever/discomfort  - Drink plenty of non-caffeinated fluids  - Use nasal steroid spray for sinus congestion  - Albuterol inhaler may be used every 6 hours as needed for chest tightness      Reasons to be seen in the emergency room:  - Develop a fever of 100.4 or higher  - Cough changes, coughing up blood, or become short of breath  - Neck stiffness  - Chest pain  - Severe headache  - Unable to tolerate eating or drinking fluids    Otherwise, if no symptom improvement after 5 days, follow-up with your primary care provider.      As a result of our visit today, here are the action plans for you:    1. Medication(s) to stop: There are no discontinued medications.    2. Medication(s) to start or change:   Medications Ordered   Medications   ? albuterol (PROAIR HFA;PROVENTIL HFA;VENTOLIN HFA) 90 mcg/actuation inhaler     Sig: Inhale 2 puffs every 6 (six) hours as needed for wheezing. Use with spacer.     Dispense:  1 each     Refill:  0     May substitute  the equivalent medication per insurance preference.   ? doxycycline (MONODOX) 100 MG capsule     Sig: Take 1 capsule (100 mg total) by mouth 2 (two) times a day for 10 days.     Dispense:  20 capsule     Refill:  0   ? inhalational spacing device Spcr     Sig: Use with MDI. Pharmacist advised use     Dispense:  1 each     Refill:  0       3. Other instructions: Yes       Acute Bronchitis  Your healthcare provider has told you that you have acute bronchitis. Bronchitis is infection or inflammation of the bronchial tubes (airways in the lungs). Normally, air moves easily in and out of the airways. Bronchitis narrows the airways, making it harder for air to flow in and out of the lungs. This causes symptoms such as shortness of breath, coughing, and wheezing. Bronchitis can be acute or chronic. Acute means the condition comes on quickly and goes away in a short time. Chronic means a condition lasts a long time and often comes back. Read on to learn more about acute bronchitis.    What causes acute bronchitis?  Acute bronchitis almost always starts as a viral respiratory infection, such as a cold or the flu. Certain factors make it more likely for a cold or flu to turn into bronchitis. These include being very young or very old or having a heart or lung problem. Cigarette smoking also makes bronchitis more likely.  When bronchitis develops, the airways become swollen. The airways may also become infected with bacteria. This is known as a secondary infection.  Diagnosing acute bronchitis  Your healthcare provider will examine you and ask about your symptoms and health history. You may also have a sputum culture to test the fluid in your lungs. Chest X-rays may be done to look for infection in the lungs.  Treating acute bronchitis  Bronchitis usually clears up as the cold or flu goes away. You can help feel better faster by doing the following:    Take medicine as directed. You may be told to take ibuprofen or other  over-the-counter medicines. These help relieve inflammation in your bronchial tubes. Your doctor may prescribe an inhaler to help open up the bronchial tubes. Most of the time, acute bronchitis is caused by a viral infection. Antibiotics are usually not prescribed for viral infections.    Drink plenty of fluids, such as water, juice, or warm soup. Fluids loosen mucus so that you can cough it up. This helps you breathe more easily. Fluids also prevent dehydration.    Make sure you get plenty of rest.    Do not smoke. Do not allow anyone else to smoke in your home.  Recovery and follow-up  Follow up with your doctor as you are told. You will likely feel better in a week or two. But a dry cough can linger beyond that time. Let your doctor know if you still have symptoms (other than a dry cough) after 2 weeks. If youre prone to getting bronchial infections, let your doctor know. And take steps to protect yourself from future infections. These steps include stopping smoking and avoiding tobacco smoke, washing your hands often, and getting a yearly flu shot.  When to call the doctor  Call the doctor if you have any of the following:    Fever of 100.4 F (38.0 C) higher    Symptoms that get worse, or new symptoms    Trouble breathing    Symptoms that dont start to improve within a week, or within 3 days of taking antibiotics   Date Last Reviewed: 8/4/2014 2000-2016 [employer]. [address] [place] [address], [place] [address]. All rights reserved. This information is not intended as a substitute for professional medical care. Always follow your healthcare professional's instructions.

## 2021-07-03 NOTE — ADDENDUM NOTE
Addendum Note by Kati Bose LICSW at 3/8/2017 11:59 PM     Author: Kati Bose LICSW Service: -- Author Type: Licensed Social Worker    Filed: 3/27/2017  1:26 PM Date of Service: 3/8/2017 11:59 PM Status: Signed    : Kati Bose LICSW (Licensed Social Worker)    Encounter addended by: PRIYANKA Marshall on: 3/27/2017  1:26 PM<BR>     Actions taken: Sign clinical note

## 2021-07-03 NOTE — ADDENDUM NOTE
Addendum Note by Unique Agarwal at 6/29/2017  7:58 AM     Author: Unique Agarwal Service: -- Author Type:     Filed: 6/29/2017  7:58 AM Encounter Date: 6/27/2017 Status: Signed    : Unique Agarwal ()    Addended by: UNIQUE AGARWAL on: 6/29/2017 07:58 AM        Modules accepted: Orders

## 2021-07-03 NOTE — ADDENDUM NOTE
Addendum Note by Rosie Agustin CMA at 7/24/2018  4:25 PM     Author: Rosie Agustin CMA Service: -- Author Type: Certified Medical Assistant    Filed: 7/24/2018  4:25 PM Encounter Date: 7/24/2018 Status: Signed    : Rosie Agustin CMA (Certified Medical Assistant)    Addended by: ROSIE AGUSTIN on: 7/24/2018 04:25 PM        Modules accepted: Orders

## 2021-07-05 ENCOUNTER — TRANSCRIBE ORDERS (OUTPATIENT)
Dept: INTERNAL MEDICINE | Facility: CLINIC | Age: 71
End: 2021-07-05

## 2021-07-05 DIAGNOSIS — M81.0 AGE-RELATED OSTEOPOROSIS WITHOUT CURRENT PATHOLOGICAL FRACTURE: ICD-10-CM

## 2021-07-05 DIAGNOSIS — Z92.29 PERSONAL HISTORY OF OTHER DRUG THERAPY: Primary | ICD-10-CM

## 2021-07-05 NOTE — PROGRESS NOTES
As part of the required manual data conversion process for integration, this encounter was created to document a CAM (Clinic Administered Medication) order. This information was copied from the [facility] patient's chart to the [facility] patient chart.     Eleanor Burroughs, Jadyn  July 5, 2021

## 2021-07-29 ENCOUNTER — ANCILLARY PROCEDURE (OUTPATIENT)
Dept: BONE DENSITY | Facility: CLINIC | Age: 71
End: 2021-07-29
Attending: INTERNAL MEDICINE
Payer: COMMERCIAL

## 2021-07-29 DIAGNOSIS — Z78.0 POST-MENOPAUSAL: ICD-10-CM

## 2021-07-29 DIAGNOSIS — M81.0 AGE-RELATED OSTEOPOROSIS WITHOUT CURRENT PATHOLOGICAL FRACTURE: ICD-10-CM

## 2021-07-29 PROCEDURE — 77080 DXA BONE DENSITY AXIAL: CPT | Mod: TC | Performed by: PHYSICIAN ASSISTANT

## 2021-07-29 PROCEDURE — 77081 DXA BONE DENSITY APPENDICULR: CPT | Mod: TC | Performed by: PHYSICIAN ASSISTANT

## 2021-09-11 ENCOUNTER — HEALTH MAINTENANCE LETTER (OUTPATIENT)
Age: 71
End: 2021-09-11

## 2022-01-01 ENCOUNTER — HEALTH MAINTENANCE LETTER (OUTPATIENT)
Age: 72
End: 2022-01-01

## 2022-02-23 ENCOUNTER — TELEPHONE (OUTPATIENT)
Dept: FAMILY MEDICINE | Facility: CLINIC | Age: 72
End: 2022-02-23

## 2022-02-23 NOTE — TELEPHONE ENCOUNTER
Reason for Call:  Other call back and prolia injection    Detailed comments: Pt is wondering when is her next prolia injection. She thinks her last injection was 6 months ago. Please advise and see if she is due, if she is, can an order be put in?    Phone Number Patient can be reached at: Home number on file [phone] (home)    Best Time: any    Can we leave a detailed message on this number? YES    Call taken on 2/23/2022 at 12:01 PM by Jamil Souza

## 2022-02-23 NOTE — TELEPHONE ENCOUNTER
FYI Last OV was 8/2020, last Prolia given 2/2021 called patient to schedule her with Dr. Blayne Cates CMA

## 2022-03-08 ASSESSMENT — ACTIVITIES OF DAILY LIVING (ADL): CURRENT_FUNCTION: NO ASSISTANCE NEEDED

## 2022-03-11 ENCOUNTER — ANCILLARY PROCEDURE (OUTPATIENT)
Dept: MAMMOGRAPHY | Facility: CLINIC | Age: 72
End: 2022-03-11
Attending: PHYSICIAN ASSISTANT
Payer: COMMERCIAL

## 2022-03-11 ENCOUNTER — LAB (OUTPATIENT)
Dept: LAB | Facility: CLINIC | Age: 72
End: 2022-03-11

## 2022-03-11 ENCOUNTER — OFFICE VISIT (OUTPATIENT)
Dept: FAMILY MEDICINE | Facility: CLINIC | Age: 72
End: 2022-03-11
Payer: COMMERCIAL

## 2022-03-11 VITALS
WEIGHT: 95 LBS | HEIGHT: 59 IN | RESPIRATION RATE: 18 BRPM | BODY MASS INDEX: 19.15 KG/M2 | HEART RATE: 79 BPM | SYSTOLIC BLOOD PRESSURE: 120 MMHG | DIASTOLIC BLOOD PRESSURE: 78 MMHG

## 2022-03-11 DIAGNOSIS — F33.9 RECURRENT MAJOR DEPRESSIVE DISORDER, REMISSION STATUS UNSPECIFIED (H): ICD-10-CM

## 2022-03-11 DIAGNOSIS — F33.9 RECURRENT MAJOR DEPRESSION (H): Primary | ICD-10-CM

## 2022-03-11 DIAGNOSIS — M81.0 OSTEOPOROSIS, UNSPECIFIED OSTEOPOROSIS TYPE, UNSPECIFIED PATHOLOGICAL FRACTURE PRESENCE: ICD-10-CM

## 2022-03-11 DIAGNOSIS — Z12.31 VISIT FOR SCREENING MAMMOGRAM: ICD-10-CM

## 2022-03-11 DIAGNOSIS — E55.9 VITAMIN D DEFICIENCY: ICD-10-CM

## 2022-03-11 DIAGNOSIS — Z12.11 SCREEN FOR COLON CANCER: ICD-10-CM

## 2022-03-11 DIAGNOSIS — Z79.899 ENCOUNTER FOR LONG-TERM (CURRENT) USE OF MEDICATIONS: ICD-10-CM

## 2022-03-11 DIAGNOSIS — Z00.00 ENCOUNTER FOR MEDICARE ANNUAL WELLNESS EXAM: Primary | ICD-10-CM

## 2022-03-11 PROBLEM — M41.20 OTHER IDIOPATHIC SCOLIOSIS, UNSPECIFIED SPINAL REGION: Status: RESOLVED | Noted: 2019-02-05 | Resolved: 2022-03-11

## 2022-03-11 PROBLEM — E87.1 HYPONATREMIA: Status: RESOLVED | Noted: 2017-05-16 | Resolved: 2022-03-11

## 2022-03-11 LAB
FOLATE SERPL-MCNC: 19.8 NG/ML
TSH SERPL DL<=0.005 MIU/L-ACNC: 2.65 UIU/ML (ref 0.3–5)
VIT B12 SERPL-MCNC: >2000 PG/ML (ref 213–816)

## 2022-03-11 PROCEDURE — 82746 ASSAY OF FOLIC ACID SERUM: CPT | Performed by: FAMILY MEDICINE

## 2022-03-11 PROCEDURE — 77067 SCR MAMMO BI INCL CAD: CPT | Mod: TC | Performed by: RADIOLOGY

## 2022-03-11 PROCEDURE — 82607 VITAMIN B-12: CPT | Performed by: FAMILY MEDICINE

## 2022-03-11 PROCEDURE — 82306 VITAMIN D 25 HYDROXY: CPT | Performed by: FAMILY MEDICINE

## 2022-03-11 PROCEDURE — 84443 ASSAY THYROID STIM HORMONE: CPT | Performed by: FAMILY MEDICINE

## 2022-03-11 PROCEDURE — 36415 COLL VENOUS BLD VENIPUNCTURE: CPT | Performed by: FAMILY MEDICINE

## 2022-03-11 PROCEDURE — G0402 INITIAL PREVENTIVE EXAM: HCPCS | Performed by: PHYSICIAN ASSISTANT

## 2022-03-11 RX ORDER — DULOXETIN HYDROCHLORIDE 30 MG/1
40 CAPSULE, DELAYED RELEASE ORAL
COMMUNITY
Start: 2022-02-28 | End: 2024-04-30

## 2022-03-11 RX ORDER — LORAZEPAM 0.5 MG/1
0.5 TABLET ORAL DAILY
COMMUNITY
Start: 2022-02-23

## 2022-03-11 ASSESSMENT — PATIENT HEALTH QUESTIONNAIRE - PHQ9: SUM OF ALL RESPONSES TO PHQ QUESTIONS 1-9: 6

## 2022-03-11 ASSESSMENT — ACTIVITIES OF DAILY LIVING (ADL): CURRENT_FUNCTION: NO ASSISTANCE NEEDED

## 2022-03-11 NOTE — PATIENT INSTRUCTIONS
Patient Education   Personalized Prevention Plan  You are due for the preventive services outlined below.  Your care team is available to assist you in scheduling these services.  If you have already completed any of these items, please share that information with your care team to update in your medical record.  Health Maintenance Due   Topic Date Due     URINE DRUG SCREEN  Never done     ANNUAL REVIEW OF HM ORDERS  Never done     Depression Action Plan  Never done     Hepatitis C Screening  Never done     Zoster (Shingles) Vaccine (1 of 2) Never done     Mammogram  05/05/2018     Diptheria Tetanus Pertussis (DTAP/TDAP/TD) Vaccine (3 - Td or Tdap) 03/03/2019     Colorectal Cancer Screening  07/05/2020     Depression Assessment  07/16/2020       Urinary Incontinence, Female (Adult)   Urinary incontinence means loss of bladder control. This problem affects many women, especially as they get older. If you have incontinence, you may be embarrassed to ask for help. But know that this problem can be treated.   Types of Incontinence  There are different types of incontinence. Two of the main types are described here. You can have more than one type.     Stress incontinence. With this type, urine leaks when pressure (stress) is put on the bladder. This may happen when you cough, sneeze, or laugh. Stress incontinence most often occurs because the pelvic floor muscles that support the bladder and urethra are weak. This can happen after pregnancy and vaginal childbirth or a hysterectomy. It can also be due to excess body weight or hormone changes.    Urge incontinence (also called overactive bladder). With this type, a sudden urge to urinate is felt often. This may happen even though there may not be much urine in the bladder. The need to urinate often during the night is common. Urge incontinence most often occurs because of bladder spasms. This may be due to bladder irritation or infection. Damage to bladder nerves or  pelvic muscles, constipation, and certain medicines can also lead to urge incontinence.  Treatment depends on the cause. Further evaluation is needed to find the type you have. This will likely include an exam and certain tests. Based on the results, you and your healthcare provider can then plan treatment. Until a diagnosis is made, the home care tips below can help ease symptoms.   Home care    Do pelvic floor muscle exercises, if they are prescribed. The pelvic floor muscles help support the bladder and urethra. Many women find that their symptoms improve when doing special exercises that strengthen these muscles. To do the exercises, contract the muscles you would use to stop your stream of urine. But do this when you re not urinating. Hold for 10 seconds, then relax. Repeat 10 to 20 times in a row, at least 3 times a day. Your healthcare provider may give you other instructions for how to do the exercises and how often.    Keep a bladder diary. This helps track how often and how much you urinate over a set period of time. Bring this diary with you to your next visit with the provider. The information can help your provider learn more about your bladder problem.    Lose weight, if advised to by your provider. Extra weight puts pressure on the bladder. Your provider can help you create a weight-loss plan that s right for you. This may include exercising more and making certain diet changes.    Don't have foods and drinks that may irritate the bladder. These can include alcohol and caffeinated drinks.    Quit smoking. Smoking and other tobacco use can lead to a long-term (chronic) cough that strains the pelvic floor muscles. Smoking may also damage the bladder and urethra. Talk with your provider about treatments or methods you can use to quit smoking.    If drinking large amounts of fluid makes you have symptoms, you may be advised to limit your fluid intake. You may also be advised to drink most of your fluids  during the day and to limit fluids at night.    If you re worried about urine leakage or accidents, you may wear absorbent pads to catch urine. Change the pads often. This helps reduce discomfort. It may also reduce the risk of skin or bladder infections.    Follow-up care  Follow up with your healthcare provider, or as directed. It may take some to find the right treatment for your problem. But healthy lifestyle changes can be made right away. These include such things as exercising on a regular basis, eating a healthy diet, losing weight (if needed), and quitting smoking. Your treatment plan may include special therapies or medicines. Certain procedures or surgery may also be options. Talk about any questions you have with your provider.   When to seek medical advice  Call the healthcare provider right away if any of these occur:    Fever of 100.4 F (38 C) or higher, or as directed by your provider    Bladder pain or fullness    Belly swelling    Nausea or vomiting    Back pain    Weakness, dizziness, or fainting  [employer] last reviewed this educational content on 1/1/2020 2000-2021 [employer]. All rights reserved. This information is not intended as a substitute for professional medical care. Always follow your healthcare professional's instructions.        Your Health Risk Assessment indicates you feel you are not in good emotional health.    Recreation   Recreation is not limited to sports and team events. It includes any activity that provides relaxation, interest, enjoyment, and exercise. Recreation provides an outlet for physical, mental, and social energy. It can give a sense of worth and achievement. It can help you stay healthy.    Mental Exercise and Social Involvement  Mental and emotional health is as important as physical health. Keep in touch with friends and family. Stay as active as possible. Continue to learn and challenge yourself.   Things you can do to stay mentally active  are:    Learn something new, like a foreign language or musical instrument.     Play SCRABBLE or do crossword puzzles. If you cannot find people to play these games with you at home, you can play them with others on your computer through the Internet.     Join a games club--anything from card games to chess or checkers or lawn bowling.     Start a new hobby.     Go back to school.     Volunteer.     Read.   Keep up with world events.

## 2022-03-11 NOTE — Clinical Note
Can you call her pharmacy and see if they gave her the Shingles vaccines?  If so, update her chart.  Thanks.

## 2022-03-11 NOTE — PROGRESS NOTES
"mammo  SUBJECTIVE:   Leeanne Duarte is a 71 year old female who presents for Preventive Visit.      Patient has been advised of split billing requirements and indicates understanding: Yes  Are you in the first 12 months of your Medicare coverage?  Yes,  Visual Acuity:  Right Eye: 20/32   Left Eye: 20/32  Both Eyes: 20/32    Healthy Habits:     In general, how would you rate your overall health?  Good    Frequency of exercise:  2-3 days/week    Duration of exercise:  30-45 minutes    Do you usually eat at least 4 servings of fruit and vegetables a day, include whole grains    & fiber and avoid regularly eating high fat or \"junk\" foods?  Yes    Taking medications regularly:  Yes    Medication side effects:  None    Ability to successfully perform activities of daily living:  No assistance needed    Home Safety:  No safety concerns identified    Hearing Impairment:  No hearing concerns    In the past 6 months, have you been bothered by leaking of urine? Yes    In general, how would you rate your overall mental or emotional health?  Fair      PHQ-2 Total Score: 2    Additional concerns today:  No    No significant concerns.  Previous provider moved to [place] that is too far for her to drive.  She is seeing psychiatry and her mood is very well controlled on present medications.  She is not sure if she has had her shingles vaccines.  We will call her pharmacy and check.  Some urine leakage, manageable.  She has labs that her psychiatrist requested and will get those done before she leaves.      Do you feel safe in your environment? Yes    Have you ever done Advance Care Planning? (For example, a Health Directive, POLST, or a discussion with a medical provider or your loved ones about your wishes): Yes, patient states has an Advance Care Planning document and will bring a copy to the clinic.    Fall risk  Fallen 2 or more times in the past year?: No  Any fall with injury in the past year?: No    Cognitive Screening "   1) Repeat 3 items (Leader, Season, Table)    2) Clock draw: NORMAL  3) 3 item recall: Recalls 3 objects  Results: 3 items recalled: COGNITIVE IMPAIRMENT LESS LIKELY    Mini-CogTM Copyright [place]. Licensed by the author for use in [facility]; reprinted with permission ([email or web address]). All rights reserved.      Do you have sleep apnea, excessive snoring or daytime drowsiness?: no    Reviewed and updated as needed this visit by clinical staff   Tobacco  Allergies  Meds              Reviewed and updated as needed this visit by Provider                 Social History     Tobacco Use     Smoking status: Never Smoker     Smokeless tobacco: Never Used   Substance Use Topics     Alcohol use: No     Comment: 7 per week     If you drink alcohol do you typically have >3 drinks per day or >7 drinks per week? No    Alcohol Use 3/11/2022   Prescreen: >3 drinks/day or >7 drinks/week? -   Prescreen: >3 drinks/day or >7 drinks/week? No       Current providers sharing in care for this patient include:   Patient Care Team:  Renée Horne MD as PCP - General (Family Practice)  Luana Barnes PA-C as Physician Assistant (Physician Assistant)  Lane Ventura MD as MD (Orthopedics)  Rudy Cisneros MD as Physician (Internal Medicine)  Abraham Senior MD as Assigned PCP    The following health maintenance items are reviewed in Epic and correct as of today:  Health Maintenance Due   Topic Date Due     URINE DRUG SCREEN  Never done     ANNUAL REVIEW OF HM ORDERS  Never done     DEPRESSION ACTION PLAN  Never done     HEPATITIS C SCREENING  Never done     ZOSTER IMMUNIZATION (1 of 2) Never done     MAMMO SCREENING  05/05/2018     DTAP/TDAP/TD IMMUNIZATION (3 - Td or Tdap) 03/03/2019       Review of Systems  Complete review of systems discussed with patient and is negative except as noted above    OBJECTIVE:   /78 (BP Location: Left arm, Patient Position: Sitting, Cuff Size: Adult  "Small)   Pulse 79   Resp 18   Ht 1.505 m (4' 11.25\")   Wt 43.1 kg (95 lb)   BMI 19.03 kg/m   Estimated body mass index is 19.03 kg/m  as calculated from the following:    Height as of this encounter: 1.505 m (4' 11.25\").    Weight as of this encounter: 43.1 kg (95 lb).  Physical Exam  GENERAL: healthy, alert and no distress  EYES: Eyes grossly normal to inspection, PERRL and conjunctivae and sclerae normal  HENT: ear canals and TM's normal  NECK: no adenopathy, no asymmetry, masses, or scars and thyroid normal to palpation  RESP: lungs clear to auscultation - no rales, rhonchi or wheezes  CV: regular rate and rhythm, normal S1 S2, no murmur, click or rub  ABDOMEN: soft, nontender, no hepatosplenomegaly, no masses and bowel sounds normal  MS: no gross musculoskeletal defects noted  SKIN: no suspicious lesions or rashes  NEURO: Normal strength and tone, mentation intact and speech normal  PSYCH: mentation appears normal, affect normal/bright      ASSESSMENT / PLAN:   1. Encounter for Medicare annual wellness exam  Health maintenance discussed with patient is appropriate for age and risk factors.  Mammogram completed today before she left.  We need to follow-up and see whether she got her shingles vaccines.  She declines tetanus shot today.  - *MA Screening Digital Bilateral; Future    2. Recurrent major depressive disorder, remission status unspecified (H)  Following with psychiatry.  Meds working well.  Psychiatrist requested some labs to be done.  Those were drawn and will be sent to him.    3. Osteoporosis, unspecified osteoporosis type, unspecified pathological fracture presence  Following with specialist.        COUNSELING:  Reviewed preventive health counseling, as reflected in patient instructions    Estimated body mass index is 19.03 kg/m  as calculated from the following:    Height as of this encounter: 1.505 m (4' 11.25\").    Weight as of this encounter: 43.1 kg (95 lb).      She reports that she has " never smoked. She has never used smokeless tobacco.      Appropriate preventive services were discussed with this patient, including applicable screening as appropriate for cardiovascular disease, diabetes, osteopenia/osteoporosis, and glaucoma.  As appropriate for age/gender, discussed screening for colorectal cancer, prostate cancer, breast cancer, and cervical cancer. Checklist reviewing preventive services available has been given to the patient.    Reviewed patients plan of care and provided an AVS.     Counseling Resources:  ATP IV Guidelines  Pooled Cohorts Equation Calculator  Breast Cancer Risk Calculator  Breast Cancer: Medication to Reduce Risk  FRAX Risk Assessment  ICSI Preventive Guidelines  Dietary Guidelines for Americans, 2010  USDA's MyPlate  ASA Prophylaxis  Lung CA Screening    [facility]    Identified Health Risks:

## 2022-03-14 ENCOUNTER — TELEPHONE (OUTPATIENT)
Dept: FAMILY MEDICINE | Facility: CLINIC | Age: 72
End: 2022-03-14
Payer: COMMERCIAL

## 2022-03-14 LAB — DEPRECATED CALCIDIOL+CALCIFEROL SERPL-MC: 142 UG/L (ref 30–80)

## 2022-03-14 NOTE — CONFIDENTIAL NOTE
Jessie Jefferson PA-C  [facility]Ob Support Pool  Can you call her pharmacy and see if they gave her the Shingles vaccines?   If so, update her chart.  Thanks.

## 2022-03-21 ENCOUNTER — ANCILLARY PROCEDURE (OUTPATIENT)
Dept: MAMMOGRAPHY | Facility: CLINIC | Age: 72
End: 2022-03-21
Attending: PHYSICIAN ASSISTANT
Payer: COMMERCIAL

## 2022-03-21 DIAGNOSIS — N64.89 BREAST ASYMMETRY: ICD-10-CM

## 2022-03-21 PROCEDURE — 76642 ULTRASOUND BREAST LIMITED: CPT | Mod: LT

## 2022-03-21 PROCEDURE — 77061 BREAST TOMOSYNTHESIS UNI: CPT | Mod: LT

## 2022-03-21 NOTE — RESULT ENCOUNTER NOTE
Please call pt and have her read My Chart message. Vitamin D level very high.  She needs to stop all her vitamin D supplements.  Please have her respond to my My Chart message.

## 2022-05-05 ENCOUNTER — TELEPHONE (OUTPATIENT)
Dept: INTERNAL MEDICINE | Facility: CLINIC | Age: 72
End: 2022-05-05
Payer: COMMERCIAL

## 2022-05-05 DIAGNOSIS — Z92.29 PERSONAL HISTORY OF OTHER DRUG THERAPY: ICD-10-CM

## 2022-05-05 DIAGNOSIS — M81.0 AGE-RELATED OSTEOPOROSIS WITHOUT CURRENT PATHOLOGICAL FRACTURE: Primary | ICD-10-CM

## 2022-05-05 NOTE — TELEPHONE ENCOUNTER
LMTCB. Patient has appointment with Fan today at 5 PM. If/when patient returns call, please inform patient that Fan cannot see patient for her Prolia as there would need to be counseling done for Prolia and Fan doesn't do that. Fan can see patient for still if patient has any other concerns wanted to discuss about. Also, please inform patient that her Prolia has not been approved by insurance yet. We are working on it right now. Please reschedule patient a week out for her Prolia on the CSS schedule.

## 2022-05-11 ENCOUNTER — ALLIED HEALTH/NURSE VISIT (OUTPATIENT)
Dept: FAMILY MEDICINE | Facility: CLINIC | Age: 72
End: 2022-05-11
Payer: COMMERCIAL

## 2022-05-11 DIAGNOSIS — M81.0 OSTEOPOROSIS, UNSPECIFIED OSTEOPOROSIS TYPE, UNSPECIFIED PATHOLOGICAL FRACTURE PRESENCE: Primary | ICD-10-CM

## 2022-05-11 PROCEDURE — 99207 PR NO CHARGE NURSE ONLY: CPT

## 2022-05-11 PROCEDURE — 96372 THER/PROPH/DIAG INJ SC/IM: CPT | Performed by: INTERNAL MEDICINE

## 2022-05-11 NOTE — PROGRESS NOTES
"Prolia Injection Phone Screen      Screening questions have been asked 2-3 days prior to administration visit for Prolia. If any questions are answered with \"Yes,\" this phone encounter were will routed to ordering provider for further evaluation.     1.  When was the last injection?  10/23/21    2.  Has insurance for this injection been verified?  Yes    3.  Did you experience any new onset achiness or rashes that lasted for over a month with your previous Prolia injection?   No    4.  Do you have a fever over 101?F or a new deep cough that is unusual for you today? No    5.  Have you started any new medications in the last 6 months that you were told could affect your immune system? These may have been prescribed by oncologist, transplant, rheumatology, or dermatology.   No    6.  In the last 6 months have you have gastric bypass or parathyroid surgery?   No    7.  Do you plan dental work requiring drilling into the bone such as implants/extractions or oral surgery in the next 2-3 months?   No    8. Do you have new insurance since the last injection?    9. Have you received the Covid-19 vaccine? Yes  If No - Proceed with Prolia injection  If Yes - Date of [id number]/04/2021  03/05/2021  11/15/2021      . Will need to wait until 2 weeks after 2nd dose of Covid-19 vaccine before administering Prolia       Patient informed if symptoms discussed above present prior to their administration appointment, they are to notify clinic immediately.     Prosper Logan              "

## 2022-08-18 NOTE — PROGRESS NOTES
"Leeanne is a 72 year old who is being evaluated via a billable video visit.      How would you like to obtain your AVS? MyChart  If the video visit is dropped, the invitation should be resent by: Text to cell phone: [phone]  Will anyone else be joining your video visit? No        Assessment & Plan   Problem List Items Addressed This Visit    None     Visit Diagnoses     Clinical diagnosis of COVID-19    -  Primary    Relevant Medications    nirmatrelvir and ritonavir (PAXLOVID) therapy pack                    COVID-19 positive patient.  Encounter for consideration of medication intervention. Patient does qualify for a prescription. Full discussion with patient including medication options, risks and benefits. Potential drug interactions reviewed with patient.     Treatment Planned Paxlovid, Rx sent to [place] pharmacy  [facility]   [phone]    [address] [place] [id number]    Hours:  Mon-Fri: 8:30a - 5:00p  Sat-Sun: 9:00a - 1:00p    Drive-thru available   Temporary change to home medications:  None     Estimated body mass index is 19.03 kg/m  as calculated from the following:    Height as of 3/11/22: 1.505 m (4' 11.25\").    Weight as of 3/11/22: 43.1 kg (95 lb).  GFR Estimate   Date Value Ref Range Status   08/11/2020 >60 >60 mL/min/1.73m2 Final   04/05/2019 >90 >60 mL/min/[1.73_m2] Final     Comment:     Non  GFR Calc  Starting 12/18/2018, serum creatinine based estimated GFR (eGFR) will be   calculated using the Chronic Kidney Disease Epidemiology Collaboration   (CKD-EPI) equation.       No results found for: [id number]    No follow-ups on file.    Elena Zhou NP  [facility]    Subjective   Leeanne is a 72 year old, presenting for the following health issues:  covid treatment (Pt tested positive yesterday with a home test. Sx are sore throat, cough, fatigue and tenderness in legs. )      HPI       COVID-19 Symptom Review  How " many days ago did these symptoms start? 8.17.2022    Are any of the following symptoms significant for you?    New or worsening difficulty breathing? No    Worsening cough? No    Fever or chills? No    Headache: No    Sore throat: YES    Chest pain: No    Diarrhea: No    Body aches? YES    What treatments has patient tried? Ibuprofen    Does patient live in a nursing home, group home, or shelter? No  Does patient have a way to get food/medications during quarantined? Yes, I have a friend or family member who can help me.      Current Outpatient Medications:      Ascorbic Acid (VITAMIN C) 500 MG CAPS, Take  by mouth daily., Disp: , Rfl:      denosumab (PROLIA) 60 MG/ML SOSY injection, Inject 60 mg Subcutaneous, Disp: , Rfl:      DULoxetine (CYMBALTA) 30 MG capsule, TAKE 1 CAPSULE BY MOUTH EVERY DAY, Disp: , Rfl:      FLUAD 0.5 ML TALI, ADM 0.5ML IM UTD (Patient not taking: Reported on 3/11/2022), Disp: , Rfl: 0     LORazepam (ATIVAN) 0.5 MG tablet, Take 0.5 mg by mouth 2 times daily, Disp: , Rfl:     Current Facility-Administered Medications:      denosumab (PROLIA) injection 60 mg, 60 mg, Subcutaneous, Q6 Months, Rudy Cisneros MD, 60 mg at 05/11/22 1406    GFR Estimate   Date Value Ref Range Status   08/11/2020 >60 >60 mL/min/1.73m2 Final   04/05/2019 >90 >60 mL/min/[1.73_m2] Final     Comment:     Non  GFR Calc  Starting 12/18/2018, serum creatinine based estimated GFR (eGFR) will be   calculated using the Chronic Kidney Disease Epidemiology Collaboration   (CKD-EPI) equation.       GFR Estimate If Black   Date Value Ref Range Status   08/11/2020 >60 >60 mL/min/1.73m2 Final   04/05/2019 >90 >60 mL/min/[1.73_m2] Final     Comment:      GFR Calc  Starting 12/18/2018, serum creatinine based estimated GFR (eGFR) will be   calculated using the Chronic Kidney Disease Epidemiology Collaboration   (CKD-EPI) equation.             Review of Systems   Unremarkable other than listed above and  "below         Objective    Vitals - Patient Reported  Weight (Patient Reported): 95 lb (43.1 kg)  Height (Patient Reported): 5' 4\" (162.6 cm)  BMI (Based on Pt Reported Ht/Wt): 16.31      Vitals:  No vitals were obtained today due to virtual visit.    Physical Exam   GENERAL: Healthy, alert and no distress  EYES: Eyes grossly normal to inspection.  No discharge or erythema, or obvious scleral/conjunctival abnormalities.  RESP: No audible wheeze, cough, or visible cyanosis.  No visible retractions or increased work of breathing.    SKIN: Visible skin clear. No significant rash, abnormal pigmentation or lesions.  NEURO: Cranial nerves grossly intact.  Mentation and speech appropriate for age.  PSYCH: Mentation appears normal, affect normal/bright, judgement and insight intact, normal speech and appearance well-groomed.                Video-Visit Details    Video Start Time: 7:34 AM    Type of service:  Video Visit    Video End Time:7:37 AM    Originating Location (pt. Location): Home    Distant Location (provider location):  [facility]     Platform used for Video Visit: Lainey    .  ..  "

## 2022-08-19 ENCOUNTER — VIRTUAL VISIT (OUTPATIENT)
Dept: INTERNAL MEDICINE | Facility: CLINIC | Age: 72
End: 2022-08-19
Payer: COMMERCIAL

## 2022-08-19 DIAGNOSIS — U07.1 CLINICAL DIAGNOSIS OF COVID-19: Primary | ICD-10-CM

## 2022-08-19 PROCEDURE — 99213 OFFICE O/P EST LOW 20 MIN: CPT | Mod: 95 | Performed by: NURSE PRACTITIONER

## 2022-09-01 ENCOUNTER — NURSE TRIAGE (OUTPATIENT)
Dept: NURSING | Facility: CLINIC | Age: 72
End: 2022-09-01

## 2022-09-01 NOTE — TELEPHONE ENCOUNTER
Patient informed of below information and will go to [facility]

## 2022-09-01 NOTE — TELEPHONE ENCOUNTER
Given recent history of COVID, there is no specific therapy that is available for this patient 2 weeks into the illness.  The shortness of breath with ambulation suggest that she should be evaluated (in the context of her age).  I recommend a clinic visit.  Urgent care would also be reasonable.

## 2022-09-01 NOTE — TELEPHONE ENCOUNTER
S-(situation): Call from patient who is asking if she needs any treatment or intervention. Patient says she does not necessarily want to be seen; she just wants to know what to do about her symptoms.    She reports she has had COVID-19 symptoms for the past 15 days.    Patient says she still has a slight cough every couple days; slight nasal congestion and fatigue.    Patient reports fatigue. Patient says she spends most of her time in bed resting -- is sleeping ok. Patient reports ambulating makes her tired and out of breath. Patient is no shortness of breath at rest.       B-(background):   Treated w/ Paxlovid  Auto-immune disorder    A-(assessment): second level triage needed.      R-(recommendations): Advised patient will route message to PCP/clinic to see if what would be the recommendation(s) for her.    Caller verbalized understanding.          Gee Souza RN/[facility]      Reason for Disposition    HIGH RISK for severe COVID complications (e.g., weak immune system, age > 64 years, obesity with BMI > 25, pregnant, chronic lung disease or other chronic medical condition) (Exception: Already seen by PCP and no new or worsening symptoms.)    [1] PERSISTING SYMPTOMS OF COVID-19 AND [2] symptoms BETTER (improving)    Additional Information    Negative: SEVERE difficulty breathing (e.g., struggling for each breath, speaks in single words)    Negative: Difficult to awaken or acting confused (e.g., disoriented, slurred speech)    Negative: Bluish (or gray) lips or face now    Negative: Shock suspected (e.g., cold/pale/clammy skin, too weak to stand, low BP, rapid pulse)    Negative: Sounds like a life-threatening emergency to the triager    Negative: SEVERE or constant chest pain or pressure  (Exception: Mild central chest pain, present only when coughing.)    Negative: MODERATE difficulty breathing (e.g., speaks in phrases, SOB even at rest, pulse 100-120)    Negative: Headache and stiff neck (can't  touch chin to chest)    Negative: Oxygen level (e.g., pulse oximetry) 90 percent or lower    Negative: Chest pain or pressure    Negative: Patient sounds very sick or weak to the triager    Negative: MILD difficulty breathing (e.g., minimal/no SOB at rest, SOB with walking, pulse <100)    Negative: Fever > 103 F (39.4 C)    Negative: [1] Fever > 101 F (38.3 C) AND [2] over 60 years of age    Negative: [1] Fever > 100.0 F (37.8 C) AND [2] bedridden (e.g., nursing home patient, CVA, chronic illness, recovering from surgery)    Negative: SEVERE difficulty breathing (e.g., struggling for each breath, speaks in single words)    Negative: [1] SEVERE weakness (e.g., can't stand or can barely walk) AND [2] new-onset or WORSE    Negative: Difficult to awaken or acting confused (e.g., disoriented, slurred speech)    Negative: Bluish (or gray) lips or face now    Negative: Sounds like a life-threatening emergency to the triager    Negative: [1] MODERATE difficulty breathing (e.g., speaks in phrases, SOB even at rest, pulse 100-120) AND [2] new-onset or WORSE    Negative: [1] MODERATE difficulty breathing AND [2] oxygen level (e.g., pulse oximetry) 91 to 94 percent    Negative: Oxygen level (e.g., pulse oximetry) 90 percent or lower    Negative: MODERATE difficulty breathing (e.g., speaks in phrases, SOB even at rest, pulse 100-120)    Negative: [1] Drinking very little AND [2] dehydration suspected (e.g., no urine > 12 hours, very dry mouth, very lightheaded)    Negative: Patient sounds very sick or weak to the triager    Negative: [1] MILD difficulty breathing (e.g., minimal/no SOB at rest, SOB with walking, pulse <100) AND [2] new-onset    Negative: Oxygen level (e.g., pulse oximetry) 91 to 94 percent    Negative: [1] PERSISTING SYMPTOMS OF COVID-19 AND [2] NEW symptom AND [3] could be serious    Negative: [1] Caller has URGENT question AND [2] triager unable to answer question    Negative: [1] PERSISTING SYMPTOMS OF  COVID-19 AND [2] symptoms WORSE    Negative: [1] Caller has NON-URGENT question AND [2] triager unable to answer    Negative: [1] PERSISTING SYMPTOMS OF COVID-19 AND [2] NO medical visit for COVID-19 in past 2 weeks    Negative: Patient wants to be seen    Negative: [1] PERSISTING SYMPTOMS OF COVID-19 AND [2] symptoms SAME AND [3] medical visit for COVID-19 in past 2 weeks    Protocols used: CORONAVIRUS (COVID-19) DIAGNOSED OR [id number] 1.18.2022, CORONAVIRUS (COVID-19) PERSISTING SYMPTOMS FOLLOW-UP CALL-A-OH 1.18.2022

## 2022-10-30 ENCOUNTER — HEALTH MAINTENANCE LETTER (OUTPATIENT)
Age: 72
End: 2022-10-30

## 2022-11-09 ENCOUNTER — OFFICE VISIT (OUTPATIENT)
Dept: INTERNAL MEDICINE | Facility: CLINIC | Age: 72
End: 2022-11-09
Payer: COMMERCIAL

## 2022-11-09 VITALS
SYSTOLIC BLOOD PRESSURE: 123 MMHG | OXYGEN SATURATION: 98 % | DIASTOLIC BLOOD PRESSURE: 62 MMHG | HEART RATE: 81 BPM | WEIGHT: 95.5 LBS | BODY MASS INDEX: 19.13 KG/M2

## 2022-11-09 DIAGNOSIS — M81.0 AGE-RELATED OSTEOPOROSIS WITHOUT CURRENT PATHOLOGICAL FRACTURE: Primary | ICD-10-CM

## 2022-11-09 DIAGNOSIS — F33.9 RECURRENT MAJOR DEPRESSIVE DISORDER, REMISSION STATUS UNSPECIFIED (H): ICD-10-CM

## 2022-11-09 LAB
ALBUMIN SERPL BCG-MCNC: 3.8 G/DL (ref 3.5–5.2)
ALP SERPL-CCNC: 92 U/L (ref 35–104)
ALT SERPL W P-5'-P-CCNC: 8 U/L (ref 10–35)
ANION GAP SERPL CALCULATED.3IONS-SCNC: 10 MMOL/L (ref 7–15)
AST SERPL W P-5'-P-CCNC: 26 U/L (ref 10–35)
BILIRUB SERPL-MCNC: 0.6 MG/DL
BUN SERPL-MCNC: 9.3 MG/DL (ref 8–23)
CA-I BLD-MCNC: 4.8 MG/DL (ref 4.4–5.2)
CALCIUM SERPL-MCNC: 9.8 MG/DL (ref 8.8–10.2)
CHLORIDE SERPL-SCNC: 94 MMOL/L (ref 98–107)
CREAT SERPL-MCNC: 1.01 MG/DL (ref 0.51–0.95)
DEPRECATED CALCIDIOL+CALCIFEROL SERPL-MC: 107 UG/L (ref 20–75)
DEPRECATED HCO3 PLAS-SCNC: 34 MMOL/L (ref 22–29)
GFR SERPL CREATININE-BSD FRML MDRD: 58 ML/MIN/1.73M2
GLUCOSE SERPL-MCNC: 88 MG/DL (ref 70–99)
POTASSIUM SERPL-SCNC: 4.7 MMOL/L (ref 3.4–5.3)
PROT SERPL-MCNC: 7.1 G/DL (ref 6.4–8.3)
SODIUM SERPL-SCNC: 138 MMOL/L (ref 136–145)

## 2022-11-09 PROCEDURE — 99214 OFFICE O/P EST MOD 30 MIN: CPT | Performed by: INTERNAL MEDICINE

## 2022-11-09 PROCEDURE — 82306 VITAMIN D 25 HYDROXY: CPT | Performed by: INTERNAL MEDICINE

## 2022-11-09 PROCEDURE — 36415 COLL VENOUS BLD VENIPUNCTURE: CPT | Performed by: INTERNAL MEDICINE

## 2022-11-09 PROCEDURE — 82330 ASSAY OF CALCIUM: CPT | Performed by: INTERNAL MEDICINE

## 2022-11-09 PROCEDURE — 80053 COMPREHEN METABOLIC PANEL: CPT | Performed by: INTERNAL MEDICINE

## 2022-11-09 ASSESSMENT — PATIENT HEALTH QUESTIONNAIRE - PHQ9
SUM OF ALL RESPONSES TO PHQ QUESTIONS 1-9: 4
SUM OF ALL RESPONSES TO PHQ QUESTIONS 1-9: 4
10. IF YOU CHECKED OFF ANY PROBLEMS, HOW DIFFICULT HAVE THESE PROBLEMS MADE IT FOR YOU TO DO YOUR WORK, TAKE CARE OF THINGS AT HOME, OR GET ALONG WITH OTHER PEOPLE: SOMEWHAT DIFFICULT

## 2022-11-23 ENCOUNTER — TELEPHONE (OUTPATIENT)
Dept: FAMILY MEDICINE | Facility: CLINIC | Age: 72
End: 2022-11-23

## 2022-11-23 NOTE — TELEPHONE ENCOUNTER
Reason for Call:  Other call back    Detailed comments: patient needs a letter of medical necessity for her second dexa scan     Phone Number Patient can be reached at: Home number on file [phone](home)    Best Time: anytime    Can we leave a detailed message on this number? YES    Call taken on 11/23/2022 at 5:12 PM by Sara Thomas

## 2022-12-06 ENCOUNTER — OFFICE VISIT (OUTPATIENT)
Dept: INTERNAL MEDICINE | Facility: CLINIC | Age: 72
End: 2022-12-06
Payer: COMMERCIAL

## 2022-12-06 ENCOUNTER — ANCILLARY PROCEDURE (OUTPATIENT)
Dept: BONE DENSITY | Facility: CLINIC | Age: 72
End: 2022-12-06
Attending: INTERNAL MEDICINE
Payer: COMMERCIAL

## 2022-12-06 VITALS
DIASTOLIC BLOOD PRESSURE: 64 MMHG | SYSTOLIC BLOOD PRESSURE: 128 MMHG | BODY MASS INDEX: 19.4 KG/M2 | WEIGHT: 96.2 LBS | HEART RATE: 76 BPM | OXYGEN SATURATION: 100 % | HEIGHT: 59 IN | RESPIRATION RATE: 17 BRPM

## 2022-12-06 DIAGNOSIS — Z78.0 POSTMENOPAUSAL STATUS: ICD-10-CM

## 2022-12-06 DIAGNOSIS — M81.0 AGE-RELATED OSTEOPOROSIS WITHOUT CURRENT PATHOLOGICAL FRACTURE: ICD-10-CM

## 2022-12-06 DIAGNOSIS — M81.0 AGE-RELATED OSTEOPOROSIS WITHOUT CURRENT PATHOLOGICAL FRACTURE: Primary | ICD-10-CM

## 2022-12-06 PROCEDURE — 99213 OFFICE O/P EST LOW 20 MIN: CPT | Mod: 25 | Performed by: INTERNAL MEDICINE

## 2022-12-06 PROCEDURE — 77080 DXA BONE DENSITY AXIAL: CPT | Mod: TC | Performed by: RADIOLOGY

## 2022-12-06 PROCEDURE — 96372 THER/PROPH/DIAG INJ SC/IM: CPT | Performed by: INTERNAL MEDICINE

## 2022-12-06 PROCEDURE — 77081 DXA BONE DENSITY APPENDICULR: CPT | Mod: TC | Performed by: RADIOLOGY

## 2022-12-06 RX ORDER — IBUPROFEN 200 MG
CAPSULE ORAL
COMMUNITY
Start: 2022-11-29

## 2022-12-06 NOTE — PROGRESS NOTES
(M81.0) Age-related osteoporosis without current pathological fracture  (primary encounter diagnosis)  Comment:  Prolia dose #8 was Feb 2021,  #9 was in May 2022. We missed the dose in 8/21. Prior Prolia started, had Reclast x 1 in 2016 and had experienced severe muscle aches and pains for the few days after the infusion.  History of fractures: T 11 compression fracture.  DXA scan done today showed stable osteopenia in both hips and high FRAX.  We will continue Prolia every 6 months.  Labs and supplements reviewed.  Component      Latest Ref Rng & Units 11/9/2022   Sodium      136 - 145 mmol/L 138   Potassium      3.4 - 5.3 mmol/L 4.7   Chloride      98 - 107 mmol/L 94 (L)   Carbon Dioxide (CO2)      22 - 29 mmol/L 34 (H)   Anion Gap      7 - 15 mmol/L 10   Urea Nitrogen      8.0 - 23.0 mg/dL 9.3   Creatinine      0.51 - 0.95 mg/dL 1.01 (H)   Calcium      8.8 - 10.2 mg/dL 9.8   Glucose      70 - 99 mg/dL 88   Alkaline Phosphatase      35 - 104 U/L 92   AST      10 - 35 U/L 26   ALT      10 - 35 U/L 8 (L)   Protein Total      6.4 - 8.3 g/dL 7.1   Albumin      3.5 - 5.2 g/dL 3.8   Bilirubin Total      <=1.2 mg/dL 0.6   GFR Estimate      >60 mL/min/1.73m2 58 (L)   Vitamin D Deficiency screening      20 - 75 ug/L 107 (H)         Patient was educated on safety of Prolia utilizing Patient Counseling Chart for Healthcare Providers, as outlined by the Prolia REMS progam.     Return in about 6 months (around 6/6/2023) for Prolia with CSS.    Patient Instructions   Prolia 10th today.  Prolia 11th in 6 months with my nurse. I will see you in 1 year.    DXA in 2 years .   Phone number to schedule [phone].    Daily calcium need is 1747-6261 mg a day from the diet and supplements.  Calcium citrate is easier to digest.  Vitamin D 2000 IU daily recommended.  Since vit D is too high in your last lab results, you can hold vit D supplements for 1-2 months.    Risk of rebound vertebral fractures is higher when Prolia suddenly  "stopped or dose was missed.      Prolia and Covid vaccine should be  for at least a week.           /64   Pulse 76   Resp 17   Ht 1.505 m (4' 11.25\")   Wt 43.6 kg (96 lb 3.2 oz)   SpO2 100%   BMI 19.27 kg/m        Did you experience any problems with previous Prolia injection? no  Any medication change in the last 6 months? no  Did you take prednisone or other immunosupressant drugs in the last 6 months   (chemo, transplant, rheum, dermatology conditions)? no  Did you have any serious infection in the last 6 months?no  Any recent hospitalizations?no  Do you plan any dental work in the next 2-3 months?no  How much calcium do you take daily from the diet and supplements?1200 mg  How much vit D do you take daily? 2000 IU  Last DXA?done today,  Reviewed and discussed      Patient is here today for the  Prolia injection. Patient tolerated previous injections well.   We discussed calcium and vit D daily needs today.       We discussed high risk of rebound vertebral fractures when Prolia suddenly stopped.    Next Prolia injection will be in 6 months.           This note has been dictated using voice recognition software. Any grammatical or context distortions are unintentional and inherent to the software      Patient Active Problem List   Diagnosis     Inflammatory autoimmune disorder (H)     Raynaud's disease     Right-sided low back pain with right-sided sciatica     Primary insomnia     Osteoporosis     Degenerative scoliosis in adult patient     Anterolisthesis     Fracture of eleventh thoracic vertebra (H)     Generalized anxiety disorder     Hyponatremia     Major depression, recurrent (H)     Tubular adenoma of colon     Vitamin D deficiency     Status post lumbar spine surgery for decompression of spinal cord       Current Outpatient Medications   Medication     Ascorbic Acid (VITAMIN C) 500 MG CAPS     calcium citrate (CITRACAL) 950 (200 Ca) MG tablet     DULoxetine (CYMBALTA) 30 MG capsule "     LORazepam (ATIVAN) 0.5 MG tablet     denosumab (PROLIA) 60 MG/ML SOSY injection     No current facility-administered medications for this visit.

## 2022-12-06 NOTE — PATIENT INSTRUCTIONS
Prolia 10th today.  Prolia 11th in 6 months with my nurse. I will see you in 1 year.    DXA in 2 years .   Phone number to schedule [phone].    Daily calcium need is 3816-0178 mg a day from the diet and supplements.  Calcium citrate is easier to digest.  Vitamin D 2000 IU daily recommended.  Since vit D is too high in your last lab results, you can hold vit D supplements for 1-2 months.    Risk of rebound vertebral fractures is higher when Prolia suddenly stopped or dose was missed.      Prolia and Covid vaccine should be  for at least a week.

## 2023-02-08 ENCOUNTER — TELEPHONE (OUTPATIENT)
Dept: INTERNAL MEDICINE | Facility: CLINIC | Age: 73
End: 2023-02-08
Payer: COMMERCIAL

## 2023-02-08 NOTE — TELEPHONE ENCOUNTER
Spoke with insurance pharmacist and relayed needed information such patient had tried and failed Reclast and that patient had previously been on Prolia.     authization information [id number]  Valid from 12/6/23-12/5/24    Jailene Marley RN

## 2023-02-08 NOTE — TELEPHONE ENCOUNTER
General Call    Contacts       Type Contact Phone/Fax    02/08/2023 03:42 PM CST Phone (Incoming) Katja @ [facility] on behalf of [employer] (Other) [phone]        Reason for Call:  Prolia authorization    What are your questions or concerns:  Katja @ [facility] on behalf of [facility] called requesting to know if Prolia is a new therapy or a continuation? Prior authorization is due by 02/10/2023. Please call Richelle to discuss her questions/concerns.    Date of last appointment with provider: 12/06/2022    Yuli Deng

## 2023-02-19 ENCOUNTER — OFFICE VISIT (OUTPATIENT)
Dept: URGENT CARE | Facility: URGENT CARE | Age: 73
End: 2023-02-19
Payer: COMMERCIAL

## 2023-02-19 VITALS
BODY MASS INDEX: 18.85 KG/M2 | DIASTOLIC BLOOD PRESSURE: 70 MMHG | HEART RATE: 74 BPM | TEMPERATURE: 97.5 F | SYSTOLIC BLOOD PRESSURE: 110 MMHG | OXYGEN SATURATION: 100 % | RESPIRATION RATE: 15 BRPM | HEIGHT: 60 IN | WEIGHT: 96 LBS

## 2023-02-19 DIAGNOSIS — S83.8X1A ACUTE LATERAL MENISCAL INJURY OF RIGHT KNEE, INITIAL ENCOUNTER: Primary | ICD-10-CM

## 2023-02-19 PROCEDURE — 99213 OFFICE O/P EST LOW 20 MIN: CPT | Performed by: INTERNAL MEDICINE

## 2023-02-19 NOTE — PROGRESS NOTES
"  Assessment & Plan     Acute lateral meniscal injury of right knee, initial encounter  Conservative management with NSAIDs, stretching exercises, rest and icing. Progress to strengthening exercises as tolerated.  Quad strengthening exercises reviewed and demonstrated.    ACE wrap applied for compression and support.    Ian French MD  [facility] [place]    Vishnu Fallon is a 72 year old, presenting for the following health issues:  Urgent Care and Musculoskeletal Problem (Slipped on ice last night, injury to right knee. )      HPI   Noting some right knee pain.  She had slipped and fallen on ice last night.  She is able to walk on level surface relatively well but has a lot of trouble with going up and down stairs.  She is known to have osteopenia.     Review of Systems   Constitutional, HEENT, cardiovascular, pulmonary, gi and gu systems are negative, except as otherwise noted.      Objective    /70   Pulse 74   Temp 97.5  F (36.4  C) (Temporal)   Resp 15   Ht 1.511 m (4' 11.5\")   Wt 43.5 kg (96 lb)   SpO2 100%   BMI 19.07 kg/m    Body mass index is 19.07 kg/m .  Physical Exam   GENERAL APPEARANCE: thin adult female, alert and no distress  MS: focal effusion of the right lateral knee; minimal patellar tenderness and no patellar apprehension; lateral joint line tenderness is present; normal varus/valgus stress; normal anterior drawer, positive pivot/shift; negative Theodore's              "

## 2023-04-28 DIAGNOSIS — Z92.29 PERSONAL HISTORY OF OTHER DRUG THERAPY: ICD-10-CM

## 2023-04-28 DIAGNOSIS — M81.0 AGE-RELATED OSTEOPOROSIS WITHOUT CURRENT PATHOLOGICAL FRACTURE: Primary | ICD-10-CM

## 2023-05-25 DIAGNOSIS — Z92.29 PERSONAL HISTORY OF OTHER DRUG THERAPY: ICD-10-CM

## 2023-05-25 DIAGNOSIS — M81.0 AGE-RELATED OSTEOPOROSIS WITHOUT CURRENT PATHOLOGICAL FRACTURE: Primary | ICD-10-CM

## 2023-06-01 ENCOUNTER — HEALTH MAINTENANCE LETTER (OUTPATIENT)
Age: 73
End: 2023-06-01

## 2023-06-06 ENCOUNTER — ALLIED HEALTH/NURSE VISIT (OUTPATIENT)
Dept: FAMILY MEDICINE | Facility: CLINIC | Age: 73
End: 2023-06-06
Payer: COMMERCIAL

## 2023-06-06 DIAGNOSIS — M81.0 OSTEOPOROSIS, UNSPECIFIED OSTEOPOROSIS TYPE, UNSPECIFIED PATHOLOGICAL FRACTURE PRESENCE: Primary | ICD-10-CM

## 2023-06-06 DIAGNOSIS — Z92.29 PERSONAL HISTORY OF OTHER DRUG THERAPY: ICD-10-CM

## 2023-06-06 DIAGNOSIS — M81.0 AGE-RELATED OSTEOPOROSIS WITHOUT CURRENT PATHOLOGICAL FRACTURE: Primary | ICD-10-CM

## 2023-06-06 PROCEDURE — 99207 PR NO CHARGE NURSE ONLY: CPT

## 2023-06-06 PROCEDURE — 96372 THER/PROPH/DIAG INJ SC/IM: CPT | Performed by: INTERNAL MEDICINE

## 2023-06-06 NOTE — PROGRESS NOTES
Indication: Prolia  (denosumab) is a prescription medicine used to treat osteoporosis in patients who:     Are at high risk for fracture, meaning patients who have had a fracture related to osteoporosis, or who have multiple risk factors for fracture     Cannot use another osteoporosis medicine or other osteoporosis medicines did not work well   The timeline for early/late injections would be 4 weeks early and any time after the 6 month marisel. If a patient receives their injection late, then the subsequent injection would be 6 months from the date that they actually received the injection    1.  When was the last injection?  12/6/22  2.  Did they check with their insurance for this calendar year?  yes  3.  Is there an order in the chart?  yes  4.  Has the patient had dental work involving the bone in the past month or will have work in the next 6 months?  no  5.  Did you have the patient wait 15 minutes after the injection?  yes  6.  Remember to use .injection under the medication notes    The following steps were completed to comply with the REMS program for Prolia:    Reviewed information in the Medication Guide and Patient Counseling Chart, including the serious risks of Prolia  and the symptoms of each risk.    Advised patient to seek prompt medical attention if they have signs or symptoms of any of the serious risks.  Provided each patient a copy of the Medication Guide and Patient Brochure.    Clinic Administered Medication Documentation      Prolia Documentation    Indication: Prolia  (denosumab) is a prescription medicine used to treat osteoporosis in patients who:     Are at high risk for fracture, meaning patients who have had a fracture related to osteoporosis, or who have multiple risk factors for fracture.    Cannot use another osteoporosis medicine or other osteoporosis medicines did not work well.    The timeline for early/late injections would be 4 weeks early and any time after the 6 month marisel. If a  patient receives their injection late, then the subsequent injection would be 6 months from the date that they actually received the injection.    When was the last injection?  22  Was the last injection at least 6 months ago? Yes  Has the prior authorization been completed?  Yes  Is there an active order (written within the past 365 days, with administrations remaining, not ) in the chart?  Yes  Patient denies any dental work involving the bone (e.g. tooth extraction or dental implants) in the past 4 weeks?  Yes  Patient denies plans for any dental work involving the bone (e.g. tooth extraction or dental implants) in the next 4 weeks? Yes    The following steps were completed to comply with the REMS program for Prolia:    Reviewed information in the Medication Guide and Patient Counseling Chart, including the serious risks of Prolia  and the symptoms of each risk.    Advised patient to seek prompt medical attention if they have signs or symptoms of any of the serious risks.    Provided each patient a copy of the Medication Guide and Patient Brochure.      Prior to injection, verified patient identity using patient's name and date of birth. Medication was administered. Please see MAR and medication order for additional information. Patient instructed to remain in clinic for 15 minutes and report any adverse reaction to staff immediately.    Vial/Syringe: Syringe  Was this medication supplied by the patient? No  Patient has no refills remaining. Refill encounter opened, order pended and Routed to the provider

## 2023-06-27 NOTE — PATIENT INSTRUCTIONS
Prolia dose # 8 was in Feb 2021. Dose #9 was in May 2022.   Labs today.    DXA scan to schedule .   Phone number to schedule [phone].    Daily calcium need is 4754-5860 mg a day from the diet and supplements.  Calcium citrate is easier to digest.  Vitamin D 2000 IU daily recommended.    Risk of rebound vertebral fractures is higher when Prolia suddenly stopped or dose was missed.      Prolia and Covid vaccine should be  for at least a week.        Hydroxyzine Counseling: Patient advised that the medication is sedating and not to drive a car after taking this medication.  Patient informed of potential adverse effects including but not limited to dry mouth, urinary retention, and blurry vision.  The patient verbalized understanding of the proper use and possible adverse effects of hydroxyzine.  All of the patient's questions and concerns were addressed.

## 2023-11-12 ENCOUNTER — HEALTH MAINTENANCE LETTER (OUTPATIENT)
Age: 73
End: 2023-11-12

## 2023-11-18 ENCOUNTER — TELEPHONE (OUTPATIENT)
Dept: NURSING | Facility: CLINIC | Age: 73
End: 2023-11-18
Payer: COMMERCIAL

## 2023-11-18 NOTE — TELEPHONE ENCOUNTER
Nurse Triage SBAR    Is this a 2nd Level Triage? NO    Situation: COVID +    Background: Pt reports she tested Positive for COVID today and is interested in getting Paxlovid. Pt not established with PCP.     Assessment: Declined triage. States she has a mild cold. Declined difficulty breathing, SOB, or chest pain.     Protocol Recommended Disposition:   No disposition on file.    Recommendation: Advised [facility] or if she can't get in today or tomorrow writer gave her information on Pending sale to [facility]. Advised [facility] if neither of those options work for her. Advised to call back with any new or worsening signs, symptoms, concerns, or questions. They verbalized understanding and agreed to follow advice given.          Estefani Singer RN on 11/18/2023 at 4:53 PM

## 2023-12-21 ENCOUNTER — OFFICE VISIT (OUTPATIENT)
Dept: INTERNAL MEDICINE | Facility: CLINIC | Age: 73
End: 2023-12-21
Payer: COMMERCIAL

## 2023-12-21 VITALS
BODY MASS INDEX: 19.03 KG/M2 | WEIGHT: 94.4 LBS | SYSTOLIC BLOOD PRESSURE: 125 MMHG | RESPIRATION RATE: 18 BRPM | HEART RATE: 58 BPM | TEMPERATURE: 98.6 F | OXYGEN SATURATION: 98 % | HEIGHT: 59 IN | DIASTOLIC BLOOD PRESSURE: 70 MMHG

## 2023-12-21 DIAGNOSIS — M81.0 AGE-RELATED OSTEOPOROSIS WITHOUT CURRENT PATHOLOGICAL FRACTURE: Primary | ICD-10-CM

## 2023-12-21 PROCEDURE — 96372 THER/PROPH/DIAG INJ SC/IM: CPT | Performed by: INTERNAL MEDICINE

## 2023-12-21 PROCEDURE — 99213 OFFICE O/P EST LOW 20 MIN: CPT | Mod: 25 | Performed by: INTERNAL MEDICINE

## 2023-12-21 RX ORDER — RESPIRATORY SYNCYTIAL VIRUS VACCINE 120MCG/0.5
0.5 KIT INTRAMUSCULAR ONCE
Qty: 1 EACH | Refills: 0 | Status: CANCELLED | OUTPATIENT
Start: 2023-12-21 | End: 2023-12-21

## 2023-12-21 ASSESSMENT — PATIENT HEALTH QUESTIONNAIRE - PHQ9
SUM OF ALL RESPONSES TO PHQ QUESTIONS 1-9: 12
10. IF YOU CHECKED OFF ANY PROBLEMS, HOW DIFFICULT HAVE THESE PROBLEMS MADE IT FOR YOU TO DO YOUR WORK, TAKE CARE OF THINGS AT HOME, OR GET ALONG WITH OTHER PEOPLE: SOMEWHAT DIFFICULT
SUM OF ALL RESPONSES TO PHQ QUESTIONS 1-9: 12

## 2023-12-21 NOTE — PATIENT INSTRUCTIONS
Prolia 12th today.  Prolia 13th in 6 months with my nurse. I will see you in 1 year.    DXA in 12/2024.   Phone number to schedule [phone].    Daily calcium need is 1224-8821 mg a day from the diet and supplements.  Calcium citrate is easier to digest.  Vitamin D 2000 IU daily recommended.    Risk of rebound vertebral fractures is higher when Prolia suddenly stopped or dose was missed.      Prolia and Covid vaccine should be  for at least a week.

## 2023-12-21 NOTE — PROGRESS NOTES
(M81.0) Age-related osteoporosis without current pathological fracture  (primary encounter diagnosis)  Comment: Prior Prolia started, had Reclast x 1 in 2016 and had experienced severe muscle aches and pains for the few days after the infusion.  History of fractures: T 11 compression fracture.  DXA scan done 12/2022,  showed stable osteopenia in both hips   We will continue Prolia every 6 months.   Component      Latest Ref Rng 11/9/2022  1:43 PM   Sodium      136 - 145 mmol/L 138    Potassium      3.4 - 5.3 mmol/L 4.7    Chloride      98 - 107 mmol/L 94 (L)    Carbon Dioxide (CO2)      22 - 29 mmol/L 34 (H)    Anion Gap      7 - 15 mmol/L 10    Urea Nitrogen      8.0 - 23.0 mg/dL 9.3    Creatinine      0.51 - 0.95 mg/dL 1.01 (H)    Calcium      8.8 - 10.2 mg/dL 9.8    Glucose      70 - 99 mg/dL 88    Alkaline Phosphatase      35 - 104 U/L 92    AST      10 - 35 U/L 26    ALT      10 - 35 U/L 8 (L)    Protein Total      6.4 - 8.3 g/dL 7.1    Albumin      3.5 - 5.2 g/dL 3.8    Bilirubin Total      <=1.2 mg/dL 0.6    GFR Estimate      >60 mL/min/1.73m2 58 (L)    Vitamin D Deficiency screening      20 - 75 ug/L 107 (H)    Calcium Ionized Whole Blood      4.4 - 5.2 mg/dL 4.8         Plan: DX Hip/Pelvis/Spine            Patient was educated on safety of Prolia utilizing Patient Counseling Chart for Healthcare Providers, as outlined by the Prolia REMS progam.     Return in about 6 months (around 6/21/2024) for Prolia with CSS.    Patient Instructions   Prolia 12th today.  Prolia 13th in 6 months with my nurse. I will see you in 1 year.    DXA in 12/2024.   Phone number to schedule [phone].    Daily calcium need is 2167-2498 mg a day from the diet and supplements.  Calcium citrate is easier to digest.  Vitamin D 2000 IU daily recommended.    Risk of rebound vertebral fractures is higher when Prolia suddenly stopped or dose was missed.      Prolia and Covid vaccine should be  for at least a week.  "          /70 (BP Location: Right arm, Patient Position: Sitting, Cuff Size: Adult Small)   Pulse 58   Temp 98.6  F (37  C) (Oral)   Resp 18   Ht 1.499 m (4' 11\")   Wt 42.8 kg (94 lb 6.4 oz)   SpO2 98%   BMI 19.07 kg/m        Did you experience any problems with previous Prolia injection? no  Any medication change in the last 6 months? no  Did you take prednisone or other immunosupressant drugs in the last 6 months   (chemo, transplant, rheum, dermatology conditions)? no  Did you have any serious infection in the last 6 months?no  Any recent hospitalizations?no  Do you plan any dental work in the next 2-3 months?no  How much calcium do you take daily from the diet and supplements?1200 mg  How much vit D do you take daily? 2000 IU  Last DXA? 12/2022, Reviewed and discussed      Patient is here today for the Prolia injection. Patient tolerated previous injections well.   We discussed calcium and vit D daily needs today.       We discussed high risk of rebound vertebral fractures when Prolia suddenly stopped.    Next Prolia injection will be in 6 months.           This note has been dictated using voice recognition software. Any grammatical or context distortions are unintentional and inherent to the software      Patient Active Problem List   Diagnosis    Inflammatory autoimmune disorder (H24)    Raynaud's disease    Right-sided low back pain with right-sided sciatica    Primary insomnia    Osteoporosis    Degenerative scoliosis in adult patient    Anterolisthesis    Fracture of eleventh thoracic vertebra (H)    Generalized anxiety disorder    Hyponatremia    Major depression, recurrent (H24)    Tubular adenoma of colon    Vitamin D deficiency    Status post lumbar spine surgery for decompression of spinal cord       Current Outpatient Medications   Medication    Ascorbic Acid (VITAMIN C) 500 MG CAPS    calcium citrate (CITRACAL) 950 (200 Ca) MG tablet    DULoxetine (CYMBALTA) 30 MG capsule    LORazepam " (ATIVAN) 0.5 MG tablet    denosumab (PROLIA) 60 MG/ML SOSY injection     Current Facility-Administered Medications   Medication    denosumab (PROLIA) injection 60 mg

## 2024-01-04 ENCOUNTER — TELEPHONE (OUTPATIENT)
Dept: FAMILY MEDICINE | Facility: CLINIC | Age: 74
End: 2024-01-04
Payer: COMMERCIAL

## 2024-01-04 NOTE — TELEPHONE ENCOUNTER
General Call    Contacts         Type Contact Phone/Fax    01/04/2024 08:17 AM CST Phone (Incoming) Dr Christiano Kelly (Other) [phone]          Reason for Call:      Dr Christiano Kelly wanting to speak to Dr Cisneros in regards to patients Dental Implants.     Told him last pt's last Prolia was 12/21/23 and next one is 06/24/24 and last Dexa Scan was 12/06/22 as he wanted that information.     Please call him back at [phone] when able.

## 2024-01-04 NOTE — TELEPHONE ENCOUNTER
Please give provider medical records if patient requests that records can be sent to that person.  But I do not do DrChina To  Phone consults.

## 2024-01-05 NOTE — TELEPHONE ENCOUNTER
Contacted  and specified question he had for .     Since patient's last prolia was 12/21, when is timeframe window that dental surgery is safe.     Plan is to replace 3 dental implants. If bone quality is poor, he may need to do a bone graft.     He is aware  is out of clinic until next week. He doesn't need her to call him, ok for other staff with her recommendation. Ok to leave him a detailed message on # given     Routing to clinician to be reviewed when she returns.

## 2024-01-07 NOTE — TELEPHONE ENCOUNTER
The best time for dental implants and oral surgery would be in between 2 Prolia doses. In her case, not before May. If possible to avoid dental implants and find other solution, will work better with Prolia injections.

## 2024-01-10 NOTE — TELEPHONE ENCOUNTER
Dr. Kelly stated that he is fine not placing implants but it is a complicated case and he will consider other options but if she does want to move forward he will do the procedure after May 2024.    MAHESH Silvestre

## 2024-01-15 ENCOUNTER — OFFICE VISIT (OUTPATIENT)
Dept: FAMILY MEDICINE | Facility: CLINIC | Age: 74
End: 2024-01-15
Payer: COMMERCIAL

## 2024-01-15 VITALS
TEMPERATURE: 97.1 F | HEIGHT: 59 IN | BODY MASS INDEX: 18.95 KG/M2 | SYSTOLIC BLOOD PRESSURE: 138 MMHG | OXYGEN SATURATION: 100 % | HEART RATE: 66 BPM | RESPIRATION RATE: 16 BRPM | WEIGHT: 94 LBS | DIASTOLIC BLOOD PRESSURE: 82 MMHG

## 2024-01-15 DIAGNOSIS — Z12.31 VISIT FOR SCREENING MAMMOGRAM: ICD-10-CM

## 2024-01-15 DIAGNOSIS — F33.9 RECURRENT MAJOR DEPRESSIVE DISORDER, REMISSION STATUS UNSPECIFIED (H): ICD-10-CM

## 2024-01-15 DIAGNOSIS — Z00.00 ENCOUNTER FOR ANNUAL WELLNESS VISIT (AWV) IN MEDICARE PATIENT: Primary | ICD-10-CM

## 2024-01-15 DIAGNOSIS — S22.089S CLOSED FRACTURE OF ELEVENTH THORACIC VERTEBRA, UNSPECIFIED FRACTURE MORPHOLOGY, SEQUELA: ICD-10-CM

## 2024-01-15 DIAGNOSIS — M81.0 AGE-RELATED OSTEOPOROSIS WITHOUT CURRENT PATHOLOGICAL FRACTURE: ICD-10-CM

## 2024-01-15 PROBLEM — E87.1 HYPONATREMIA: Status: RESOLVED | Noted: 2017-05-16 | Resolved: 2024-01-15

## 2024-01-15 LAB
ALBUMIN SERPL BCG-MCNC: 4 G/DL (ref 3.5–5.2)
ALP SERPL-CCNC: 76 U/L (ref 40–150)
ALT SERPL W P-5'-P-CCNC: 13 U/L (ref 0–50)
ANION GAP SERPL CALCULATED.3IONS-SCNC: 8 MMOL/L (ref 7–15)
AST SERPL W P-5'-P-CCNC: 33 U/L (ref 0–45)
BILIRUB SERPL-MCNC: 0.7 MG/DL
BUN SERPL-MCNC: 11.7 MG/DL (ref 8–23)
CALCIUM SERPL-MCNC: 9.5 MG/DL (ref 8.8–10.2)
CHLORIDE SERPL-SCNC: 99 MMOL/L (ref 98–107)
CHOLEST SERPL-MCNC: 188 MG/DL
CREAT SERPL-MCNC: 0.82 MG/DL (ref 0.51–0.95)
DEPRECATED HCO3 PLAS-SCNC: 30 MMOL/L (ref 22–29)
EGFRCR SERPLBLD CKD-EPI 2021: 75 ML/MIN/1.73M2
ERYTHROCYTE [DISTWIDTH] IN BLOOD BY AUTOMATED COUNT: 13.1 % (ref 10–15)
FASTING STATUS PATIENT QL REPORTED: NO
GLUCOSE SERPL-MCNC: 96 MG/DL (ref 70–99)
HCT VFR BLD AUTO: 50.1 % (ref 35–47)
HDLC SERPL-MCNC: 61 MG/DL
HGB BLD-MCNC: 16.3 G/DL (ref 11.7–15.7)
LDLC SERPL CALC-MCNC: 102 MG/DL
MCH RBC QN AUTO: 32 PG (ref 26.5–33)
MCHC RBC AUTO-ENTMCNC: 32.5 G/DL (ref 31.5–36.5)
MCV RBC AUTO: 98 FL (ref 78–100)
NONHDLC SERPL-MCNC: 127 MG/DL
PLATELET # BLD AUTO: 169 10E3/UL (ref 150–450)
POTASSIUM SERPL-SCNC: 5.1 MMOL/L (ref 3.4–5.3)
PROT SERPL-MCNC: 7.5 G/DL (ref 6.4–8.3)
RBC # BLD AUTO: 5.1 10E6/UL (ref 3.8–5.2)
SODIUM SERPL-SCNC: 137 MMOL/L (ref 135–145)
TRIGL SERPL-MCNC: 124 MG/DL
WBC # BLD AUTO: 4.9 10E3/UL (ref 4–11)

## 2024-01-15 PROCEDURE — 80061 LIPID PANEL: CPT | Performed by: PHYSICIAN ASSISTANT

## 2024-01-15 PROCEDURE — 99213 OFFICE O/P EST LOW 20 MIN: CPT | Mod: 25 | Performed by: PHYSICIAN ASSISTANT

## 2024-01-15 PROCEDURE — 85027 COMPLETE CBC AUTOMATED: CPT | Performed by: PHYSICIAN ASSISTANT

## 2024-01-15 PROCEDURE — 36415 COLL VENOUS BLD VENIPUNCTURE: CPT | Performed by: PHYSICIAN ASSISTANT

## 2024-01-15 PROCEDURE — 99397 PER PM REEVAL EST PAT 65+ YR: CPT | Performed by: PHYSICIAN ASSISTANT

## 2024-01-15 PROCEDURE — 80053 COMPREHEN METABOLIC PANEL: CPT | Performed by: PHYSICIAN ASSISTANT

## 2024-01-15 RX ORDER — RESPIRATORY SYNCYTIAL VIRUS VACCINE 120MCG/0.5
0.5 KIT INTRAMUSCULAR ONCE
Qty: 1 EACH | Refills: 0 | Status: CANCELLED | OUTPATIENT
Start: 2024-01-15 | End: 2024-01-15

## 2024-01-15 ASSESSMENT — ENCOUNTER SYMPTOMS
PALPITATIONS: 0
SORE THROAT: 0
COUGH: 0
FREQUENCY: 0
CONSTIPATION: 0
JOINT SWELLING: 0
ABDOMINAL PAIN: 0
DIZZINESS: 0
SHORTNESS OF BREATH: 0
HEARTBURN: 0
PARESTHESIAS: 0
ARTHRALGIAS: 0
NERVOUS/ANXIOUS: 0
HEADACHES: 0
DYSURIA: 0
MYALGIAS: 0
NAUSEA: 0
BREAST MASS: 0
EYE PAIN: 0
CHILLS: 0
WEAKNESS: 0
HEMATURIA: 0
DIARRHEA: 0
FEVER: 0
HEMATOCHEZIA: 0

## 2024-01-15 ASSESSMENT — PATIENT HEALTH QUESTIONNAIRE - PHQ9
SUM OF ALL RESPONSES TO PHQ QUESTIONS 1-9: 18
10. IF YOU CHECKED OFF ANY PROBLEMS, HOW DIFFICULT HAVE THESE PROBLEMS MADE IT FOR YOU TO DO YOUR WORK, TAKE CARE OF THINGS AT HOME, OR GET ALONG WITH OTHER PEOPLE: VERY DIFFICULT
SUM OF ALL RESPONSES TO PHQ QUESTIONS 1-9: 18

## 2024-01-15 ASSESSMENT — ACTIVITIES OF DAILY LIVING (ADL): CURRENT_FUNCTION: NO ASSISTANCE NEEDED

## 2024-01-15 NOTE — PATIENT INSTRUCTIONS
Consider getting the following vaccines at your pharmacy:  Tetanus (Tdap), Rsv, Shingles        Preventive Health Recommendations    See your health care provider every year to  Review health changes.   Discuss preventive care.    Review your medicines if your doctor has prescribed any.  You no longer need a yearly Pap test unless you've had an abnormal Pap test in the past 10 years. If you have vaginal symptoms, such as bleeding or discharge, be sure to talk with your provider about a Pap test.  Every 1 to 2 years, have a mammogram.  If you are over 69, talk with your health care provider about whether or not you want to continue having screening mammograms.  Every 10 years, have a colonoscopy. Or, have a yearly FIT test (stool test). These exams will check for colon cancer.   Have a cholesterol test every 5 years, or more often if your doctor advises it.   Have a diabetes test (fasting glucose) every three years. If you are at risk for diabetes, you should have this test more often.   At age 65, have a bone density scan (DEXA) to check for osteoporosis (brittle bone disease).    Shots:  Get a flu shot each year.  Get a tetanus shot every 10 years.  Talk to your doctor about your pneumonia vaccines. There are now two you should receive - Pneumovax (PPSV 23) and Prevnar (PCV 13).  Talk to your pharmacist about the shingles vaccine.  Talk to your doctor about the hepatitis B vaccine.    Nutrition:   Eat at least 5 servings of fruits and vegetables each day.  Eat whole-grain bread, whole-wheat pasta and brown rice instead of white grains and rice.  Get adequate Calcium and Vitamin D.     Lifestyle  Exercise at least 150 minutes a week (30 minutes a day, 5 days a week). This will help you control your weight and prevent disease.  Limit alcohol to one drink per day.  No smoking.   Wear sunscreen to prevent skin cancer.   See your dentist twice a year for an exam and cleaning.  See your eye doctor every 1 to 2 years to  screen for conditions such as glaucoma, macular degeneration and cataracts.    Personalized Prevention Plan  You are due for the preventive services outlined below.  Your care team is available to assist you in scheduling these services.  If you have already completed any of these items, please share that information with your care team to update in your medical record.  Health Maintenance   Topic Date Due    URINE DRUG SCREEN  Never done    DEPRESSION ACTION PLAN  Never done    HEPATITIS C SCREENING  Never done    ZOSTER IMMUNIZATION (1 of 2) Never done    RSV VACCINE (Pregnancy & 60+) (1 - 1-dose 60+ series) Never done    MEDICARE ANNUAL WELLNESS VISIT  05/02/2018    DTAP/TDAP/TD IMMUNIZATION (3 - Td or Tdap) 03/03/2019    LIPID  05/02/2022    MAMMO SCREENING  03/21/2023    ANNUAL REVIEW OF HM ORDERS  01/15/2025    FALL RISK ASSESSMENT  01/15/2025    PHQ-9  01/15/2025    COLORECTAL CANCER SCREENING  07/05/2027    ADVANCE CARE PLANNING  01/15/2029    DEXA  12/06/2037    INFLUENZA VACCINE  Completed    Pneumococcal Vaccine: 65+ Years  Completed    COVID-19 Vaccine  Completed    IPV IMMUNIZATION  Aged Out    HPV IMMUNIZATION  Aged Out    MENINGITIS IMMUNIZATION  Aged Out    RSV MONOCLONAL ANTIBODY  Aged Out

## 2024-01-15 NOTE — PROGRESS NOTES
"SUBJECTIVE:   Leeanne is a 73 year old, presenting for the following:  Annual Visit        1/15/2024     2:42 PM   Additional Questions   Roomed by juli   Accompanied by self     Overall doing well.  Currently working with her psychiatrist to wean off of her benzodiazepine.  She notes that has been difficult, and she has had some side effects/withdrawal type symptoms.  She is actively working with her psychiatrist for this, and does have information available about what to do for symptoms or if mood worsens.    Living with her .  Able to complete all her ADLs without difficulty.    Are you in the first 12 months of your Medicare coverage?  No    Healthy Habits:     In general, how would you rate your overall health?  Fair    Do you usually eat at least 4 servings of fruit and vegetables a day, include whole grains    & fiber and avoid regularly eating high fat or \"junk\" foods?  Yes    Ability to successfully perform activities of daily living:  No assistance needed    Home Safety:  No safety concerns identified    Hearing Impairment:  No hearing concerns    In the past 6 months, have you been bothered by leaking of urine? Yes    In general, how would you rate your overall mental or emotional health?  Poor    Additional concerns today:  No      Today's PHQ-9 Score:       1/15/2024     2:52 PM   PHQ-9 SCORE   PHQ-9 Total Score MyChart 18 (Moderately severe depression)   PHQ-9 Total Score 18       Have you ever done Advance Care Planning? (For example, a Health Directive, POLST, or a discussion with a medical provider or your loved ones about your wishes): Yes, advance care planning is on file.    Fall risk  Fallen 2 or more times in the past year?: Yes  Any fall with injury in the past year?: No  Timed Up and Go Test (>13.5 is fall risk; contact physician) : 2    Cognitive Screening   1) Repeat 3 items (Leader, Season, Table)    2) Clock draw: NORMAL  3) 3 item recall: Recalls 3 objects  Results: NORMAL clock, " 1-2 items recalled: COGNITIVE IMPAIRMENT LESS LIKELY    Mini-CogTM Copyright WALLACE Ho. Licensed by the author for use in [facility]; reprinted with permission (joyce@[facility]). All rights reserved.      Do you have sleep apnea, excessive snoring or daytime drowsiness? : no    Reviewed and updated as needed this visit by clinical staff   Tobacco  Allergies  Meds              Reviewed and updated as needed this visit by Provider                 Social History     Tobacco Use    Smoking status: Never    Smokeless tobacco: Never   Substance Use Topics    Alcohol use: No     Comment: 7 per week         1/15/2024     2:54 PM   Alcohol Use   Prescreen: >3 drinks/day or >7 drinks/week? No     Do you have a current opioid prescription? No  Do you use any other controlled substances or medications that are not prescribed by a provider? None        Current providers sharing in care for this patient include:   Patient Care Team:  Renée Horne MD as PCP - General (Family Medicine)  Luana Barnes PA-C as Physician Assistant (Physician Assistant)  Lane Ventura MD as MD (Orthopedics)  Rudy Cisneros MD as Physician (Internal Medicine)  Rudy Cisneros MD as Assigned PCP    The following health maintenance items are reviewed in Epic and correct as of today:  Health Maintenance   Topic Date Due    URINE DRUG SCREEN  Never done    DEPRESSION ACTION PLAN  Never done    HEPATITIS C SCREENING  Never done    ZOSTER IMMUNIZATION (1 of 2) Never done    RSV VACCINE (Pregnancy & 60+) (1 - 1-dose 60+ series) Never done    MEDICARE ANNUAL WELLNESS VISIT  05/02/2018    DTAP/TDAP/TD IMMUNIZATION (3 - Td or Tdap) 03/03/2019    LIPID  05/02/2022    MAMMO SCREENING  03/21/2023    ANNUAL REVIEW OF HM ORDERS  01/15/2025    FALL RISK ASSESSMENT  01/15/2025    PHQ-9  01/15/2025    ADVANCE CARE PLANNING  03/13/2027    COLORECTAL CANCER SCREENING  07/05/2027    DEXA  12/06/2037    INFLUENZA VACCINE   "Completed    Pneumococcal Vaccine: 65+ Years  Completed    COVID-19 Vaccine  Completed    IPV IMMUNIZATION  Aged Out    HPV IMMUNIZATION  Aged Out    MENINGITIS IMMUNIZATION  Aged Out    RSV MONOCLONAL ANTIBODY  Aged Out       Review of Systems   Constitutional:  Negative for chills and fever.   HENT:  Negative for congestion, ear pain, hearing loss and sore throat.    Eyes:  Negative for pain and visual disturbance.   Respiratory:  Negative for cough and shortness of breath.    Cardiovascular:  Negative for chest pain, palpitations and peripheral edema.   Gastrointestinal:  Negative for abdominal pain, constipation, diarrhea, heartburn, hematochezia and nausea.   Breasts:  Negative for tenderness, breast mass and discharge.   Genitourinary:  Negative for dysuria, frequency, genital sores, hematuria, pelvic pain, urgency, vaginal bleeding and vaginal discharge.   Musculoskeletal:  Negative for arthralgias, joint swelling and myalgias.   Skin:  Negative for rash.   Neurological:  Negative for dizziness, weakness, headaches and paresthesias.   Psychiatric/Behavioral:  Positive for mood changes. The patient is not nervous/anxious.        OBJECTIVE:   /82 (BP Location: Left arm, Patient Position: Sitting, Cuff Size: Adult Small)   Pulse 66   Temp 97.1  F (36.2  C) (Temporal)   Resp 16   Ht 1.499 m (4' 11\")   Wt 42.6 kg (94 lb)   SpO2 100%   BMI 18.99 kg/m   Estimated body mass index is 18.99 kg/m  as calculated from the following:    Height as of this encounter: 1.499 m (4' 11\").    Weight as of this encounter: 42.6 kg (94 lb).  Physical Exam  GENERAL: healthy, alert and no distress, appropriately groomed with normal affect  EYES: Eyes grossly normal to inspection, PERRL and conjunctivae and sclerae normal  HENT: ear canals and TM's normal, nose and mouth without ulcers or lesions  NECK: no adenopathy, no asymmetry, masses, or scars and thyroid normal to palpation  RESP: lungs clear to auscultation - no " rales, rhonchi or wheezes  BREAST: normal without masses, tenderness or nipple discharge and no palpable axillary masses or adenopathy  CV: regular rate and rhythm, normal S1 S2, no S3 or S4, no murmur, click or rub, no peripheral edema and peripheral pulses strong  ABDOMEN: soft, nontender, no hepatosplenomegaly, no masses and bowel sounds normal  MS: no gross musculoskeletal defects noted, no edema  SKIN: no suspicious lesions or rashes  NEURO: Normal strength and tone, mentation intact and speech normal  PSYCH: mentation appears normal, affect normal/bright    Today's labs pending.    ASSESSMENT / PLAN:   1. Encounter for annual wellness visit (AWV) in Medicare patient  Screening labs ordered and I will follow-up with results.  We reviewed that I would recommend she get shingles vaccine, RSV, and Tdap.  However, she needs to get all of these done at her pharmacy.  I wrote that down for her.  - Comprehensive metabolic panel  - Lipid Profile  - CBC with platelets  - *MA Screening Digital Bilateral; Future    2. Recurrent major depressive disorder, remission status unspecified (H24)  Chronic, fairly stable.  Actively following with psychiatry.  They are working on weaning her off of her benzodiazepine for anxiety.  She does note that her mood is worsened somewhat because of that.  However, no acute concerns.  She is in frequent contact with her psychiatrist, has directions and a plan of what to do if symptoms worsen.  Her psychiatrist requested that we do some lab work and fax them the results.  Fax to  Dr. Hess  Phone # [phone]    3. Age-related osteoporosis without current pathological fracture  4. Closed fracture of eleventh thoracic vertebra, unspecified fracture morphology, sequela  Chronic, stable.  Actively following with endocrine for osteoporosis treatment.        COUNSELING:  Reviewed preventive health counseling, as reflected in patient instructions        She reports that she has never smoked. She  has never used smokeless tobacco.      Appropriate preventive services were discussed with this patient, including applicable screening as appropriate for fall prevention, nutrition, physical activity, Tobacco-use cessation, weight loss and cognition.  Checklist reviewing preventive services available has been given to the patient.    Reviewed patients plan of care and provided an AVS. The Intermediate Care Plan ( asthma action plan, low back pain action plan, and migraine action plan) for Leeanne meets the Care Plan requirement. This Care Plan has been established and reviewed with the Patient.        Prior to immunization administration, verified patients identity using patient s name and date of birth. Please see Immunization Activity for additional information.     Screening Questionnaire for Adult Immunization    Are you sick today?   No   Do you have allergies to medications, food, a vaccine component or latex?   No   Have you ever had a serious reaction after receiving a vaccination?   No   Do you have a long-term health problem with heart, lung, kidney, or metabolic disease (e.g., diabetes), asthma, a blood disorder, no spleen, complement component deficiency, a cochlear implant, or a spinal fluid leak?  Are you on long-term aspirin therapy?   No   Do you have cancer, leukemia, HIV/AIDS, or any other immune system problem?   No   Do you have a parent, brother, or sister with an immune system problem?   No   In the past 3 months, have you taken medications that affect  your immune system, such as prednisone, other steroids, or anticancer drugs; drugs for the treatment of rheumatoid arthritis, Crohn s disease, or psoriasis; or have you had radiation treatments?   No   Have you had a seizure, or a brain or other nervous system problem?   No   During the past year, have you received a transfusion of blood or blood    products, or been given immune (gamma) globulin or antiviral drug?   No   For women: Are you  pregnant or is there a chance you could become       pregnant during the next month?   No   Have you received any vaccinations in the past 4 weeks?   No     Immunization questionnaire answers were all negative.      Patient instructed to remain in clinic for 15 minutes afterwards, and to report any adverse reactions.     Screening performed by Miracle Kay MA on 1/15/2024 at 2:58 PM.    Jessie Jefferson PA-C  [facility]    Identified Health Risks:  I have reviewed Opioid Use Disorder and Substance Use Disorder risk factors and made any needed referrals.   .undefined[[id number]^^

## 2024-01-17 ENCOUNTER — ANCILLARY PROCEDURE (OUTPATIENT)
Dept: MAMMOGRAPHY | Facility: CLINIC | Age: 74
End: 2024-01-17
Attending: PHYSICIAN ASSISTANT
Payer: COMMERCIAL

## 2024-01-17 DIAGNOSIS — Z00.00 ENCOUNTER FOR ANNUAL WELLNESS VISIT (AWV) IN MEDICARE PATIENT: ICD-10-CM

## 2024-01-17 DIAGNOSIS — Z12.31 VISIT FOR SCREENING MAMMOGRAM: ICD-10-CM

## 2024-01-17 PROCEDURE — 77067 SCR MAMMO BI INCL CAD: CPT | Mod: TC | Performed by: RADIOLOGY

## 2024-01-29 ENCOUNTER — PATIENT OUTREACH (OUTPATIENT)
Dept: GASTROENTEROLOGY | Facility: CLINIC | Age: 74
End: 2024-01-29
Payer: COMMERCIAL

## 2024-01-30 ENCOUNTER — TELEPHONE (OUTPATIENT)
Dept: OTOLARYNGOLOGY | Facility: CLINIC | Age: 74
End: 2024-01-30
Payer: COMMERCIAL

## 2024-01-30 DIAGNOSIS — Z01.10 ENCOUNTER FOR HEARING EXAMINATION: Primary | ICD-10-CM

## 2024-01-31 ENCOUNTER — TELEPHONE (OUTPATIENT)
Dept: AUDIOLOGY | Facility: CLINIC | Age: 74
End: 2024-01-31
Payer: COMMERCIAL

## 2024-02-01 NOTE — PROGRESS NOTES
AUDIOLOGY REPORT    SUBJECTIVE: Leeanne Duarte is a 73 year old female who was seen at the [facility] on 2/02/24 for audiologic evaluation, referred by, Estrella Briseno MD. The patient has not been seen previously in this clinic. The patient reports her  encouraged her to have her hearing tested, but she does not have hearing concerns. Leeanne denies otalgia, otorrhea, aural fullness, tinnitus, dizziness, ear surgery, and noise exposure.         OBJECTIVE:  Abuse Screening:  Do you feel unsafe at home or work/school? No  Do you feel threatened by someone? No  Does anyone try to keep you from having contact with others, or doing things outside of your home? No  Physical signs of abuse present? No     Fall Risk Screen:  1. Have you fallen two or more times in the past year? No  2. Have you fallen and had an injury in the past year? No    Timed Up and Go Score (in seconds): Not tested  Is patient a fall risk?   Referral initiated: No  Fall Risk Assessment Completed by Audiology    Otoscopic exam indicates slight irritation and minimal non-occluding cerumen in the right canal, clear left canal     Pure Tone Thresholds assessed using conventional audiometry with good reliability from 250-8000 Hz bilaterally using insert earphones and circumaural headphones.     RIGHT:  Normal sloping to moderately-severe sensorineural hearing loss     LEFT:    Normal sloping to severe sensorineural hearing loss with 15-20 dB asymmetries from 6-8 kHz    Tympanogram:    RIGHT: normal eardrum mobility    LEFT:   normal eardrum mobility    Reflexes (reported by stimulus ear): 1000 Hz  RIGHT: Ipsilateral is present at normal levels  RIGHT: Contralateral is present at normal levels  LEFT:   Ipsilateral is present at normal levels  LEFT:   Contralateral is present at normal levels    Speech Reception Threshold:    RIGHT: 10 dB HL    LEFT:   10 dB HL    Word Recognition Score:      RIGHT: 100% at 60 dB HL using NU-6 recorded word list.    LEFT:   100% at 60 dB HL using NU-6 recorded word list.      ASSESSMENT: Today's results reveal asymmetric high-frequency sensorineural hearing loss bilaterally. Today s results were discussed with the patient in detail.     PLAN: The patient was counseled regarding hearing loss and impact on communication. She is provided a communication tips handout to use as a resource. It is recommended that she return in 2 years to monitor the asymmetry, sooner if new concerns arise. A referral to ENT may be warranted at the next appointment if the asymmetry has worsened. The patient indicates understanding and agrees with the plan. Please call this clinic with questions regarding these results or recommendations.      Luciano Jones, CCC-A  Clinical Audiologist  [place] #[id number]

## 2024-02-02 ENCOUNTER — OFFICE VISIT (OUTPATIENT)
Dept: AUDIOLOGY | Facility: CLINIC | Age: 74
End: 2024-02-02
Payer: COMMERCIAL

## 2024-02-02 DIAGNOSIS — Z01.10 ENCOUNTER FOR HEARING EXAMINATION: ICD-10-CM

## 2024-02-02 DIAGNOSIS — H90.3 SENSORINEURAL HEARING LOSS (SNHL), BILATERAL: Primary | ICD-10-CM

## 2024-02-02 PROCEDURE — 92557 COMPREHENSIVE HEARING TEST: CPT | Performed by: AUDIOLOGIST

## 2024-02-02 PROCEDURE — 92550 TYMPANOMETRY & REFLEX THRESH: CPT | Performed by: AUDIOLOGIST

## 2024-02-14 DIAGNOSIS — F51.04 PSYCHOPHYSIOLOGICAL INSOMNIA: Primary | ICD-10-CM

## 2024-02-16 ENCOUNTER — OFFICE VISIT (OUTPATIENT)
Dept: SLEEP MEDICINE | Facility: CLINIC | Age: 74
End: 2024-02-16
Attending: PSYCHIATRY & NEUROLOGY
Payer: COMMERCIAL

## 2024-02-16 VITALS
WEIGHT: 94 LBS | HEART RATE: 77 BPM | OXYGEN SATURATION: 98 % | HEIGHT: 59 IN | SYSTOLIC BLOOD PRESSURE: 137 MMHG | DIASTOLIC BLOOD PRESSURE: 76 MMHG | BODY MASS INDEX: 18.95 KG/M2

## 2024-02-16 DIAGNOSIS — F41.1 GAD (GENERALIZED ANXIETY DISORDER): ICD-10-CM

## 2024-02-16 DIAGNOSIS — F32.1 CURRENT MODERATE EPISODE OF MAJOR DEPRESSIVE DISORDER, UNSPECIFIED WHETHER RECURRENT (H): ICD-10-CM

## 2024-02-16 DIAGNOSIS — F51.04 PSYCHOPHYSIOLOGICAL INSOMNIA: ICD-10-CM

## 2024-02-16 DIAGNOSIS — F51.04 CHRONIC INSOMNIA: ICD-10-CM

## 2024-02-16 DIAGNOSIS — F43.12 POST-TRAUMATIC STRESS DISORDER, CHRONIC: Primary | ICD-10-CM

## 2024-02-16 PROCEDURE — 90791 PSYCH DIAGNOSTIC EVALUATION: CPT | Performed by: PSYCHOLOGIST

## 2024-02-16 NOTE — PROGRESS NOTES
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  Diplomate, Behavioral Sleep Medicine  [facility]      Copy:   Jessie Jefferson MD  [address] [address]    Note: This dictation was created using voice recognition software. This document may contain an error not identified before finalizing the document. If the error changes the accuracy of the document, I would appreciate it being brought to my attention.

## 2024-02-16 NOTE — NURSING NOTE
"Chief Complaint   Patient presents with    Insomnia       Initial /76   Pulse 77   Ht 1.499 m (4' 11\")   Wt 42.6 kg (94 lb)   SpO2 98%   BMI 18.99 kg/m   Estimated body mass index is 18.99 kg/m  as calculated from the following:    Height as of this encounter: 1.499 m (4' 11\").    Weight as of this encounter: 42.6 kg (94 lb).    Medication Reconciliation: complete    Neck circumference: 12.5 inches / 32 centimeters.    Zaid Bolanos CMA on 2/16/2024 at 2:37 PM   "

## 2024-02-17 NOTE — PATIENT INSTRUCTIONS
If you are experiencing a mental health crisis and need immediate help, call 988 to reach the Suicide and Crisis Lifeline    You can also reach out to your local crisis emergency line for immediate assistance:    CIRILO,KELLY,SH      [facility]:  call COPE at [phone]           MPLW            [facility]:  Adults - [phone];                                                         Children - [phone]    [facility]:  [phone]                                     [facility]:  [phone]                                    [facility]: [phone]       [facility]:  [phone]                  [facility]:  [phone]      [facility]:  [phone]      If you are experiencing a life-threatening emergency call 911 or go to your nearest Hospital Emergency Department.

## 2024-03-25 ENCOUNTER — TELEPHONE (OUTPATIENT)
Dept: OPHTHALMOLOGY | Facility: CLINIC | Age: 74
End: 2024-03-25
Payer: COMMERCIAL

## 2024-03-28 ENCOUNTER — OFFICE VISIT (OUTPATIENT)
Dept: OPHTHALMOLOGY | Facility: CLINIC | Age: 74
End: 2024-03-28
Payer: COMMERCIAL

## 2024-03-28 DIAGNOSIS — H01.02A SQUAMOUS BLEPHARITIS OF UPPER AND LOWER EYELIDS OF BOTH EYES: ICD-10-CM

## 2024-03-28 DIAGNOSIS — H01.02B SQUAMOUS BLEPHARITIS OF UPPER AND LOWER EYELIDS OF BOTH EYES: ICD-10-CM

## 2024-03-28 DIAGNOSIS — H52.203 ASTIGMATISM OF BOTH EYES WITH PRESBYOPIA: ICD-10-CM

## 2024-03-28 DIAGNOSIS — H52.4 ASTIGMATISM OF BOTH EYES WITH PRESBYOPIA: ICD-10-CM

## 2024-03-28 DIAGNOSIS — Z96.1 PSEUDOPHAKIA: Primary | ICD-10-CM

## 2024-03-28 PROCEDURE — 92015 DETERMINE REFRACTIVE STATE: CPT | Performed by: OPTOMETRIST

## 2024-03-28 PROCEDURE — 92004 COMPRE OPH EXAM NEW PT 1/>: CPT | Performed by: OPTOMETRIST

## 2024-03-28 ASSESSMENT — CONF VISUAL FIELD
OD_SUPERIOR_NASAL_RESTRICTION: 0
OD_SUPERIOR_TEMPORAL_RESTRICTION: 0
OD_NORMAL: 1
METHOD: COUNTING FINGERS
OS_INFERIOR_TEMPORAL_RESTRICTION: 0
OS_SUPERIOR_NASAL_RESTRICTION: 0
OS_SUPERIOR_TEMPORAL_RESTRICTION: 0
OS_INFERIOR_NASAL_RESTRICTION: 0
OS_NORMAL: 1
OD_INFERIOR_NASAL_RESTRICTION: 0
OD_INFERIOR_TEMPORAL_RESTRICTION: 0

## 2024-03-28 ASSESSMENT — VISUAL ACUITY
OD_SC: 20/30
METHOD: SNELLEN - LINEAR
OS_SC: 20/40
OS_SC+: +2

## 2024-03-28 ASSESSMENT — CUP TO DISC RATIO
OD_RATIO: 0.55
OS_RATIO: 0.55

## 2024-03-28 ASSESSMENT — TONOMETRY
OS_IOP_MMHG: 17
IOP_METHOD: ICARE
OD_IOP_MMHG: 12

## 2024-03-28 ASSESSMENT — REFRACTION_MANIFEST
OD_SPHERE: -0.75
OS_SPHERE: +0.25
OD_AXIS: 005
OD_CYLINDER: +0.75
OS_CYLINDER: +1.00
OD_ADD: +2.50
OS_ADD: +2.50
OS_AXIS: 160

## 2024-03-28 NOTE — NURSING NOTE
Chief Complaints and History of Present Illnesses   Patient presents with    Annual Eye Exam     Chief Complaint(s) and History of Present Illness(es)       Annual Eye Exam              Laterality: both eyes    Onset: 1 year ago    Severity: mild    Course: stable    Treatments tried: no treatments    Response to treatment: no improvement    Pain scale: 0/10              Comments    Does not wear glasses for distance and uses readers.  Denies history of diabetes.   Denies dry eyes.  Denies flashes or floaters.  Denies eye pain.     Estefani Zelaya on 3/28/2024 at 11:55 AM

## 2024-03-28 NOTE — PATIENT INSTRUCTIONS
Warm compresses: Use 1-2x/day for 5-10 minutes over closed eyelids  -Jesus mask  -Tranquileyes beaded mask  -Mibo heating pad  -[place] REST & RELIEF Eye Mask (Hot or Cold)  -I-RELIEF Therapy Mask  -OcuTherm Essentials Kit    You can also use a warm wet washcloth - however this frequently loses heat quickly and can dry your skin out a bit so we recommend any of the above re-usable beaded/gel eyemasks      To purchase these products you can look over-the-counter at [employer] or purchase online at the following websites:  -www."[email or web address]"/  -www.[email or web address]

## 2024-03-28 NOTE — PROGRESS NOTES
A/P  1.) Pseudophakia with residual astigmatism  -Overall doing well uncorrected. Using OTC readers for near  -Option for Rx glasses if desired. Distance only or bifocal per pt preference  -Mild PCO but not visually significant    2.) Blepharitis OU  -Symptomatic for burning sensation  -Ocular rosacea type lid changes  -Start warm compresses prn. Consider further therapy as needed    Monitor 1-2 years comprehensive, sooner prn    I have confirmed the patient's CC, HPI and reviewed Past Medical History, Past Surgical History, Social History, Family History, Problem List, Medication List and agree with Tech note.     Rea Leong, MARIA LUZ FAAO FSLS

## 2024-04-05 ASSESSMENT — SLEEP AND FATIGUE QUESTIONNAIRES
HOW LIKELY ARE YOU TO NOD OFF OR FALL ASLEEP WHILE SITTING QUIETLY AFTER LUNCH WITHOUT ALCOHOL: WOULD NEVER DOZE
HOW LIKELY ARE YOU TO NOD OFF OR FALL ASLEEP WHILE WATCHING TV: WOULD NEVER DOZE
HOW LIKELY ARE YOU TO NOD OFF OR FALL ASLEEP WHILE SITTING AND READING: WOULD NEVER DOZE
HOW LIKELY ARE YOU TO NOD OFF OR FALL ASLEEP WHEN YOU ARE A PASSENGER IN A CAR FOR AN HOUR WITHOUT A BREAK: WOULD NEVER DOZE
HOW LIKELY ARE YOU TO NOD OFF OR FALL ASLEEP IN A CAR, WHILE STOPPED FOR A FEW MINUTES IN TRAFFIC: WOULD NEVER DOZE
HOW LIKELY ARE YOU TO NOD OFF OR FALL ASLEEP WHILE SITTING AND TALKING TO SOMEONE: WOULD NEVER DOZE
HOW LIKELY ARE YOU TO NOD OFF OR FALL ASLEEP WHILE LYING DOWN TO REST IN THE AFTERNOON WHEN CIRCUMSTANCES PERMIT: WOULD NEVER DOZE
HOW LIKELY ARE YOU TO NOD OFF OR FALL ASLEEP WHILE SITTING INACTIVE IN A PUBLIC PLACE: SLIGHT CHANCE OF DOZING

## 2024-04-09 ENCOUNTER — HOSPITAL ENCOUNTER (EMERGENCY)
Facility: CLINIC | Age: 74
Discharge: HOME | End: 2024-04-09
Payer: COMMERCIAL

## 2024-04-09 ENCOUNTER — APPOINTMENT (OUTPATIENT)
Dept: RADIOLOGY | Facility: CLINIC | Age: 74
End: 2024-04-09
Attending: EMERGENCY MEDICINE
Payer: COMMERCIAL

## 2024-04-09 ENCOUNTER — HOSPITAL ENCOUNTER (EMERGENCY)
Facility: CLINIC | Age: 74
Discharge: HOME | End: 2024-04-09
Admitting: PHYSICIAN ASSISTANT
Payer: COMMERCIAL

## 2024-04-09 VITALS
BODY MASS INDEX: 19.19 KG/M2 | TEMPERATURE: 97.8 F | WEIGHT: 95 LBS | DIASTOLIC BLOOD PRESSURE: 74 MMHG | HEART RATE: 73 BPM | SYSTOLIC BLOOD PRESSURE: 148 MMHG | OXYGEN SATURATION: 100 % | RESPIRATION RATE: 18 BRPM

## 2024-04-09 VITALS
TEMPERATURE: 97.8 F | RESPIRATION RATE: 16 BRPM | SYSTOLIC BLOOD PRESSURE: 115 MMHG | HEART RATE: 80 BPM | DIASTOLIC BLOOD PRESSURE: 66 MMHG | OXYGEN SATURATION: 99 %

## 2024-04-09 DIAGNOSIS — S69.91XA HAND INJURY, RIGHT, INITIAL ENCOUNTER: ICD-10-CM

## 2024-04-09 DIAGNOSIS — S69.91XA INJURY OF RIGHT RING FINGER, INITIAL ENCOUNTER: ICD-10-CM

## 2024-04-09 PROCEDURE — 99282 EMERGENCY DEPT VISIT SF MDM: CPT

## 2024-04-09 PROCEDURE — 73130 X-RAY EXAM OF HAND: CPT | Mod: RT

## 2024-04-09 PROCEDURE — 99283 EMERGENCY DEPT VISIT LOW MDM: CPT

## 2024-04-09 PROCEDURE — 29130 APPL FINGER SPLINT STATIC: CPT

## 2024-04-09 ASSESSMENT — COLUMBIA-SUICIDE SEVERITY RATING SCALE - C-SSRS
1. IN THE PAST MONTH, HAVE YOU WISHED YOU WERE DEAD OR WISHED YOU COULD GO TO SLEEP AND NOT WAKE UP?: NO
2. HAVE YOU ACTUALLY HAD ANY THOUGHTS OF KILLING YOURSELF IN THE PAST MONTH?: NO
6. HAVE YOU EVER DONE ANYTHING, STARTED TO DO ANYTHING, OR PREPARED TO DO ANYTHING TO END YOUR LIFE?: NO
6. HAVE YOU EVER DONE ANYTHING, STARTED TO DO ANYTHING, OR PREPARED TO DO ANYTHING TO END YOUR LIFE?: NO
2. HAVE YOU ACTUALLY HAD ANY THOUGHTS OF KILLING YOURSELF IN THE PAST MONTH?: NO
1. IN THE PAST MONTH, HAVE YOU WISHED YOU WERE DEAD OR WISHED YOU COULD GO TO SLEEP AND NOT WAKE UP?: NO

## 2024-04-09 NOTE — DISCHARGE INSTRUCTIONS
was seen here in the emergency department for evaluation of injury of the right-sided ring finger.  I suspect he likely injured one of the tendons.  I want her to follow-up with [facility].  Call the number for [facility] tomorrow, and schedule an outpatient appointment to follow-up with hand surgery.  Use ibuprofen or Tylenol at home for pain.    Dosage recommendations are included below.  Keep the splint in place until you see hand surgery.    For pain or fever you may use:  -Tylenol 650 mg every 6 hours.  Max 4000 mg in 24 hours  Do not use thismedication with alcohol as it can cause liver problems.  -Ibuprofen 600 mg every 6 hours.  Max 3500 mg in 24 hours  Do not take this medication if you have a history of a GI bleed or have kidney problems.  You may use both of these medications at the same time or you can alternate them every 3 hours.  For example, Tylenol at 6 AM, ibuprofen at 9 AM, Tylenol at noon, etc.

## 2024-04-09 NOTE — ED PROVIDER NOTES
EMERGENCY DEPARTMENT ENCOUNTER      NAME: Leeanne Duarte  AGE: 73 year old female  YOB: 1950  MRN: [id number]  EVALUATION DATE & TIME: No admission date for patient encounter.    PCP: Jessie Jefferson    ED PROVIDER: Juan Francisco Landis PA-C      Chief Complaint   Patient presents with    Hand Injury       FINAL IMPRESSION:  1. Hand injury, right, initial encounter    2. Injury of right ring finger, initial encounter      ED COURSE & MEDICAL DECISION MAKING:    Pertinent Labs & Imaging studies reviewed. (See chart for details)  5:13 PM I met the patient and performed my initial interview and exam. Discussed referral to Orthopedics.  5:17 PM Performed splint.  5:30 PM Patient will be discharged home.    73 year old female presents to the Emergency Department for evaluation of right 4th digit injury and pain.    ED Course as of 04/09/24 1730   Tue Apr 09, 2024 1729 Patient is a 73-year-old female presents emergency department for evaluation of injury to the right ring finger of the hand.  Patient reportedly was trying to put a Bhavesh tree away earlier today.  She is able to flex and extend the finger, however reports that she is not able to completely extend it.  She does have pain with resisting flexion, as well as pain with resisting extension.  Suspect she likely has a possible tendon injury to the finger.  Will have her follow-up with [facility] for further workup.  X-rays here were done earlier today, I have independently reviewed these, they do not show any evidence of any acute fractures.  Patient was placed in a finger splint.  Recommend ibuprofen and Tylenol at home.  Patient is discharged at this time, recommend outpatient orthopedics follow-up.     Medical Decision Making  Obtained supplemental history:Supplemental history obtained?: Documented in chart  Reviewed external records: External records reviewed?: Documented in chart  Care impacted by chronic  illness:Other: Inflammatory autoimmune disorder, Raynaud's disease, and osteoporosis  Care significantly affected by social determinants of health:N/A  Did you consider but not order tests?: Work up considered but not performed and documented in chart, if applicable  Did you interpret images independently?: Independent interpretation of ECG and images noted in documentation, when applicable.  Consultation discussion with other provider:Did you involve another provider (consultant, , pharmacy, etc.)?: No  Discharge. No recommendations on prescription strength medication(s). See documentation for any additional details.         At the conclusion of the encounter I discussed the results of all of the tests and the disposition. The questions were answered. The patient or family acknowledged understanding and was agreeable with the care plan.       0 minutes of critical care time     MEDICATIONS GIVEN IN THE EMERGENCY:  Medications - No data to display    NEW PRESCRIPTIONS STARTED AT TODAY'S ER VISIT  New Prescriptions    No medications on file     =================================================================    HPI    Patient information was obtained from: Patient    Use of : N/A        Leeanne Duarte is a 73 year old female with a pertinent history of inflammatory autoimmune disorder, Raynaud's disease, and osteoporosis, who presents to this ED via walk-in for evaluation of right 4th digit pain and injury.    Patient reports she was attempting to put a dale tree in a garbage bag and notes she was pushing the tree into the bag with great force followed by immediate pain to her right 4th digit. Patient reports she is able to flex right 4th digit, but isn't able to hyperextend finger without pain. No other reported complaints or concerns at this time.      PAST MEDICAL HISTORY:  Past Medical History:   Diagnosis Date    Anxiety     Depression     Hyponatremia 05/16/2017    Osteoporosis     Vitamin  D deficiency     Wedge compression fracture of unspecified thoracic vertebra, sequela 06/30/2016       PAST SURGICAL HISTORY:  Past Surgical History:   Procedure Laterality Date    CATARACT EXTRACTION, BILATERAL  2014    CATARACT IOL, RT/LT  2014    HARVEST BONE MARROW FROM ILIAC CREST Right 4/3/2019    Procedure: Jasper Bone Marrow From Iliac Crest;  Surgeon: Lane Ventura MD;  Location: [facility] OR    KIDNEY STONE SURGERY      OPTICAL TRACKING SYSTEM FUSION SPINE POSTERIOR LUMBAR THREE+ LEVELS N/A 4/3/2019    Procedure: Lumbar 3-4 Decompression, Lumbar 4-Sacral 1 Transforaminal Lumbar Interbody Fusion, Lumbar 5-Sacral 1 Kaminski Maxwell Osteotomy, Lumbar 4-Pelvis Instrumentation, Illiac Crest Autograft;  Surgeon: Lane Ventura MD;  Location: [facility] OR    OTHER SURGICAL HISTORY      HAMSTRING SURGERY           CURRENT MEDICATIONS:    Ascorbic Acid (VITAMIN C) 500 MG CAPS  calcium citrate (CITRACAL) 950 (200 Ca) MG tablet  denosumab (PROLIA) 60 MG/ML SOSY injection  DULoxetine (CYMBALTA) 30 MG capsule  LORazepam (ATIVAN) 0.5 MG tablet         ALLERGIES:  Allergies   Allergen Reactions    Chocolate     Orange     Gabapentin      Confusion         FAMILY HISTORY:  Family History   Problem Relation Age of Onset    Colon Cancer Maternal Grandmother     Colon Cancer Maternal Grandfather     High cholesterol Mother     Hypertension Mother     Myocardial Infarction Mother 72    High cholesterol Father     Hypertension Father     Myocardial Infarction Father 76    Acute Myocardial Infarction Mother     Hyperlipidemia Mother     Alcoholism Mother     Arthritis Mother     Depression Mother     Acute Myocardial Infarction Father     Hyperlipidemia Father     Alcoholism Father     Prostate Cancer Father     Diabetes Sister     Alcoholism Sister     Heart Disease Paternal Uncle     Alcoholism Paternal Uncle     Arthritis Maternal Grandmother     Alcoholism Brother     Dementia Brother     No Known Problems Son      Alcoholism Maternal Uncle     Alcoholism Paternal Aunt        SOCIAL HISTORY:   Social History     Socioeconomic History    Marital status:    Tobacco Use    Smoking status: Never    Smokeless tobacco: Never   Substance and Sexual Activity    Alcohol use: No     Comment: 7 per week    Drug use: No    Sexual activity: Never   Social History Narrative    How much exercise per week? 1/2 -1 hr daily    How much calcium per day? Supplement + 3 glasses milk       How much caffeine per day? 0    How much vitamin D per day? supplement    Do you/your family wear seatbelts?  Yes    Do you/your family use safety helmets? No    Do you/your family use sunscreen? Yes    Do you/your family keep firearms in the home? No    Do you/your family have a smoke detector(s)? Yes        Do you feel safe in your home? Yes    Has anyone ever touched you in an unwanted manner? No     Explain         November 13, 2014 Aida Yoder LPN             Social Determinants of Health     Financial Resource Strain: Low Risk  (12/21/2023)    Financial Resource Strain     Within the past 12 months, have you or your family members you live with been unable to get utilities (heat, electricity) when it was really needed?: No   Food Insecurity: Low Risk  (12/21/2023)    Food Insecurity     Within the past 12 months, did you worry that your food would run out before you got money to buy more?: No     Within the past 12 months, did the food you bought just not last and you didn t have money to get more?: No   Transportation Needs: Low Risk  (12/21/2023)    Transportation Needs     Within the past 12 months, has lack of transportation kept you from medical appointments, getting your medicines, non-medical meetings or appointments, work, or from getting things that you need?: No   Interpersonal Safety: Low Risk  (1/15/2024)    Interpersonal Safety     Do you feel physically and emotionally safe where you currently live?: Yes     Within the past 12  months, have you been hit, slapped, kicked or otherwise physically hurt by someone?: No     Within the past 12 months, have you been humiliated or emotionally abused in other ways by your partner or ex-partner?: No   Housing Stability: Low Risk  (12/21/2023)    Housing Stability     Do you have housing? : Yes     Are you worried about losing your housing?: No     VITALS:  BP (!) 148/74   Pulse 73   Temp 97.8  F (36.6  C) (Temporal)   Resp 18   Wt 43.1 kg (95 lb)   SpO2 100%   BMI 19.19 kg/m      PHYSICAL EXAM    Physical Exam  Vitals and nursing note reviewed.   Constitutional:       General: She is not in acute distress.     Appearance: Normal appearance. She is normal weight. She is not toxic-appearing or diaphoretic.   HENT:      Right Ear: External ear normal.      Left Ear: External ear normal.   Eyes:      Conjunctiva/sclera: Conjunctivae normal.   Cardiovascular:      Pulses: Normal pulses.   Pulmonary:      Effort: Pulmonary effort is normal.   Abdominal:      General: Abdomen is flat.      Palpations: Abdomen is soft.      Tenderness: There is no abdominal tenderness. There is no right CVA tenderness, left CVA tenderness, guarding or rebound.   Musculoskeletal:         General: Tenderness and signs of injury present.      Comments: Patient with pain with resisting flexion and extension of the right ring finger.  Patient not able to completely extend the right ring finger.  Held partially in flexion.  Otherwise sensation intact.   Skin:     Findings: No erythema or rash.   Neurological:      Mental Status: She is alert. Mental status is at baseline.        LAB:  All pertinent labs reviewed and interpreted.  Labs Ordered and Resulted from Time of ED Arrival to Time of ED Departure - No data to display    RADIOLOGY:  Reviewed all pertinent imaging. Please see official radiology report.  No orders to display     PROCEDURES:   None.     INeyda, am serving as a scribe to document services  personally performed by Juan Francisco Landis PA-C, based on my observation and the provider's statements to me. I, Juan Francisco Landis PA-C, attest that Neydajose Douglasan is acting in a scribe capacity, has observed my performance of the services and has documented them in accordance with my direction.    Juan Francisco Landis PA-C  Emergency Medicine  [facility] EMERGENCY ROOM  [address] [address]  [phone]  Dept: [phone]       Juan Francisco Landis PA-C  04/09/24 8521

## 2024-04-09 NOTE — ED TRIAGE NOTES
Pt presents to the ED with c/o injury to right hand ring finger. Pt was attempting to put a dale tree away. Pt endorses that she is able to curl finger into palm, not able to abduct finger. Was triaged earlier today, had to leave. Had x-ray done earlier today here.      Triage Assessment (Adult)       Row Name 04/09/24 5213          Triage Assessment    Airway WDL WDL        Respiratory WDL    Respiratory WDL WDL        Skin Circulation/Temperature WDL    Skin Circulation/Temperature WDL WDL        Cardiac WDL    Cardiac WDL WDL        Peripheral/Neurovascular WDL    Peripheral Neurovascular WDL WDL        Cognitive/Neuro/Behavioral WDL    Cognitive/Neuro/Behavioral WDL WDL

## 2024-04-09 NOTE — ED TRIAGE NOTES
Pt c/o R fourth finger pain since packing boxes 1 week ago. States she can flex the finger but not extend it. Pt concerned it is dislocated. No obvious deformities noted. Small amount of swelling noted.     Triage Assessment (Adult)       Row Name 04/09/24 1425          Triage Assessment    Airway WDL WDL        Respiratory WDL    Respiratory WDL WDL        Skin Circulation/Temperature WDL    Skin Circulation/Temperature WDL WDL        Cardiac WDL    Cardiac WDL WDL        Peripheral/Neurovascular WDL    Peripheral Neurovascular WDL WDL        Cognitive/Neuro/Behavioral WDL    Cognitive/Neuro/Behavioral WDL WDL

## 2024-04-12 ENCOUNTER — VIRTUAL VISIT (OUTPATIENT)
Dept: SLEEP MEDICINE | Facility: CLINIC | Age: 74
End: 2024-04-12
Payer: COMMERCIAL

## 2024-04-12 VITALS
DIASTOLIC BLOOD PRESSURE: 78 MMHG | WEIGHT: 92.5 LBS | HEIGHT: 59 IN | SYSTOLIC BLOOD PRESSURE: 123 MMHG | BODY MASS INDEX: 18.65 KG/M2

## 2024-04-12 DIAGNOSIS — F51.04 CHRONIC INSOMNIA: Primary | ICD-10-CM

## 2024-04-12 PROCEDURE — 90832 PSYTX W PT 30 MINUTES: CPT | Mod: 95 | Performed by: PSYCHOLOGIST

## 2024-04-12 RX ORDER — DULOXETIN HYDROCHLORIDE 60 MG/1
CAPSULE, DELAYED RELEASE ORAL
COMMUNITY
Start: 2024-04-05

## 2024-04-12 ASSESSMENT — PATIENT HEALTH QUESTIONNAIRE - PHQ9: SUM OF ALL RESPONSES TO PHQ QUESTIONS 1-9: 12

## 2024-04-12 NOTE — NURSING NOTE
Depression Response    Patient completed the PHQ-9 assessment for depression and scored >9? Yes  Question 9 on the PHQ-9 was positive for suicidality? No  Does patient have current mental health provider? No    Is this a virtual visit? Yes   Does patient have suicidal ideation (positive question 9)? No - offer to place Mental Health Referral.  Patient declined referral/not needed    Patient is scheduled for an appointment with mental health provider Barbara Irwin on 7/09/24. No referral order placed.     I personally notified the following: visit provider    Is the patient currently in the state of [place]? YES    Visit mode:VIDEO    If the visit is dropped, the patient can be reconnected by: VIDEO VISIT: Text to cell phone:   Telephone Information:   Mobile [phone]       Will anyone else be joining the visit? NO  (If patient encounters technical issues they should call [phone] :[phone])    How would you like to obtain your AVS? MyChart    Are changes needed to the allergy or medication list? Yes Duloxetine 60 mg, pulled in from medication reconcile.    Are refills needed on medications prescribed by this physician? NO    Reason for visit: RECHECK    Maday Meier, [facility]

## 2024-04-12 NOTE — PROGRESS NOTES
"Virtual Visit Details    Type of service:  Video Visit   Video Start Time: 3:38 PM  Video End Time: 3 54 p.m.    Originating Location (pt. Location): Home    Distant Location (provider location):  On-site  Platform used for Video Visit: [facility] VIRTUAL FOLLOW-UP VISIT  Sleep Psychology    Patient Name: Leeanne Duarte  MRN:  [id number]  Date of Service: Apr 12, 2024       Subjective Report     Leeanne Duarte  returns for a telehealth video visit to discuss progress in implementing behavioral strategies for the management of insomnia.  Patient consent for initiation of video visit was obtained and documented prior to initiation of visit.     Leeanne reports she follow-up with her psychiatrist and that her dose of duloxetine has been adjusted upward to 60 mg.  She also has restarted lorazepam, 0.5 mg for insomnia and is finding this has been very helpful in combination with her efforts to establish and maintain a consistent sleep schedule and follow stimulus control recommendations.  Mood today has been somewhat depressed upon screening with the PHQ-9.  She is not reporting any suicidal ideation intention or plan.  She has been scheduled to initiate psychotherapy in July and is very invested in following up with this stating that \"I think this would be very helpful for me to have someone to process my depression\"     .     Sleep Data:     Source of Sleep Estimates:  Verbal Self-report    Average Time in Bed: 7-8 hrs.  Average Total Sleep Time: 7 hrs  Sleep Efficiency estimate: Greater than 85%    EPWORTH SLEEPINESS SCALE    Sitting and reading 0   Watching TV 0   Sitting, inactive in a public place (theatre or mtg.) 1    As a passenger in a car 0   Lying down to rest in the afternoon when circumstance permit 0   Sitting and talking to someone 0   Sitting quietly after lunch without alcohol 0   In a car, while stopped for a few minutes in traffic 0   TOTAL SCORE (nl <11) 1     INSOMNIA " "SEVERITY INDEX     Difficulty falling asleep 2   Difficult staying asleep 3   Problems waking up to early 4   How SATISFIED/DISSATISFIED are you with your CURRENT sleep pattern? 4   How NOTICEABLE to others do you think your sleep pattern is in terms of your quality of life? 4   How WORRIED/DISTRESSED are you about your current sleep pattern? 4   To what extent do you consider your sleep problem to INTERFERE with your daily fuctioning(e.g. daytime fatigue, mood, ability to function at work/daily chores, concentration, mood,etc.) CURRENTLY? 4   INSOMNIA SEVERITY INDEX TOTAL SCORE 25    Absence of insomnia (0-7); sub-threshold insomnia (8-14); moderate insomnia (15-21); and severe insomnia (22-28)       Interventions     Strategies and recommendations including  depression, anxiety and insomnia, combining behavioral strategies, sleep compression and sleep medication  were reviewed and discussed today.     Services provided are compliant with the requirements of [place] Statute SS 256B.0625 Subd. 3b and paragraph (b)       Vital Signs     /78   Ht 1.499 m (4' 11\")   Wt 42 kg (92 lb 8 oz)   BMI 18.68 kg/m       Mental Status     Orientation:  X3  Mood:   Improved, though continues to be somewhat depressed  Affect:  Congruent with mood  Speech/Language:  Normal  Thought Process: Intact  Associations:  Normal  Thought Content: Normal  Patient does not report any suicidal ideation, intention or plan.    Diagnostic Impressions and Plan     Chronic insomnia    Patient reports she has been following closely with her psychiatrist and addressing depression and insomnia symptoms with pharmacotherapy.  Her antidepressant, duloxetine has been increased and she restarted use of lorazepam, 0.5 mg for treatment of insomnia.  She has establish and maintain the recommended behavioral sleep plan and sleep schedule which she has found helpful.  Lorazepam has also been helping promote sleep maintenance and reduced difficulties " with sleep initiation.    Plan:   Continue with her current sleep plan, follow-up with her psychiatrist for managing pharmacotherapy for depression and insomnia, keep psychotherapy referral scheduled for July for treatment of depression.  Follow-up with sleep psychology as needed    Follow-up: as needed      Pietro Meyer PsyD, LP, [facility]  Diplomate, Behavioral Sleep Medicine  [facility]      Note: This dictation was created using voice recognition software. This document may contain an error not identified before finalizing the document. If the error changes the accuracy of the document, I would appreciate it being brought to my attention.

## 2024-04-30 ENCOUNTER — VIRTUAL VISIT (OUTPATIENT)
Dept: SLEEP MEDICINE | Facility: CLINIC | Age: 74
End: 2024-04-30
Payer: COMMERCIAL

## 2024-04-30 VITALS — WEIGHT: 100 LBS | HEIGHT: 58 IN | BODY MASS INDEX: 20.99 KG/M2

## 2024-04-30 DIAGNOSIS — F51.01 PRIMARY INSOMNIA: Primary | ICD-10-CM

## 2024-04-30 PROCEDURE — 99204 OFFICE O/P NEW MOD 45 MIN: CPT | Mod: 95 | Performed by: INTERNAL MEDICINE

## 2024-04-30 ASSESSMENT — SLEEP AND FATIGUE QUESTIONNAIRES
HOW LIKELY ARE YOU TO NOD OFF OR FALL ASLEEP WHILE WATCHING TV: WOULD NEVER DOZE
HOW LIKELY ARE YOU TO NOD OFF OR FALL ASLEEP WHILE LYING DOWN TO REST IN THE AFTERNOON WHEN CIRCUMSTANCES PERMIT: WOULD NEVER DOZE
HOW LIKELY ARE YOU TO NOD OFF OR FALL ASLEEP WHILE SITTING QUIETLY AFTER LUNCH WITHOUT ALCOHOL: WOULD NEVER DOZE
HOW LIKELY ARE YOU TO NOD OFF OR FALL ASLEEP WHILE SITTING AND TALKING TO SOMEONE: WOULD NEVER DOZE
HOW LIKELY ARE YOU TO NOD OFF OR FALL ASLEEP WHILE SITTING INACTIVE IN A PUBLIC PLACE: WOULD NEVER DOZE
HOW LIKELY ARE YOU TO NOD OFF OR FALL ASLEEP WHEN YOU ARE A PASSENGER IN A CAR FOR AN HOUR WITHOUT A BREAK: WOULD NEVER DOZE
HOW LIKELY ARE YOU TO NOD OFF OR FALL ASLEEP WHILE SITTING AND READING: WOULD NEVER DOZE
HOW LIKELY ARE YOU TO NOD OFF OR FALL ASLEEP IN A CAR, WHILE STOPPED FOR A FEW MINUTES IN TRAFFIC: WOULD NEVER DOZE

## 2024-04-30 ASSESSMENT — PATIENT HEALTH QUESTIONNAIRE - PHQ9: SUM OF ALL RESPONSES TO PHQ QUESTIONS 1-9: 12

## 2024-04-30 ASSESSMENT — PAIN SCALES - GENERAL: PAINLEVEL: NO PAIN (0)

## 2024-04-30 NOTE — PROGRESS NOTES
Virtual Visit Details    Type of service:  Video Visit   Video Start Time: 3:56 PM  Video End Time:3:56 PM    Originating Location (pt. Location): Home    Distant Location (provider location):  On-site  Platform used for Video Visit: Lainey    Name: Leeanne Duarte MRN# [id number]   Age: 74 year old YOB: 1950     Date of Consultation: April 30, 2024  Consultation is requested by: No referring provider defined for this encounter. No ref. provider found  Primary care provider: Jessie Jefferson       Reason for Sleep Consult:     Leeanne Duarte is sent by No ref. provider found for a sleep consultation regarding medication management for insomnia           Assessment and Plan:     Summary Sleep Diagnoses:  Severe insomnia (SEBASTIÁN 25)      Comorbid Diagnoses:  Recurrent depression  Generalized anxiety disorder  Raynaud's disease  Posttraumatic stress disorder    Summary Recommendations(things to be done):  We discussed the role of decreasing lorazepam medications soley for safety reasons and to prevent impaired cognition     There are currently no indications for sleep testing.  Continue to follow the excellent recommendations by Dr. Meyer regarding your bedtime and wake up time with bright light in the morning and move out of your bedroom to listen to podcast if you find yourself frustrated with intention to sleep.  Leave the mobile phone that you reference for time of night in a separate room  Over the next 2 months, take 1 and 1/2 tablets of your lorazepam at bedtime  Send MyChart message regarding your regular bedtime and wake up time at this 2-month interval and estimate how long it takes for you to fall asleep-you will receive further instructions at that time and you will have a 6-month follow-up placeholder appointment if you continue to have problems  Remember that since you are having no daytime sleepiness it is likely that you are getting sufficient sleep      Medical Decision-making:               HISTORY PRESENT ILLNESS:     Leeanne Duarte is a 74 year old year old recently seen by Dr. Pietro Meyer for management of insomnia that has been chronically treated with lorazepam (currently 1.0 mg) with unsuccessful taper.  She has no prior history of snoring or observed apneas and has remote history of restless legs currently not symptomatic.  She currently does not have daytime sleepiness even when she has difficulty initiating sleep.    She has been urged to have a formal sleep consultation by her psychiatrist to determine whether sleep testing is necessary and to assist with medication management for her sleep.  She did experience anxiety and insomnia with attempts to reduce her lorazepam over 1 month time.  The patient has a pill cutter and would be interested in a more graduated dose reduction of her lorazepam.      Previous notes 2/2024 Pietro Meyer:    Leeanne Duarte was seen for a sleep psychology consultation and possible behavioral sleep intervention and treatment. History and clinical presentation suggest chronic psychophysiologic insomnia due largely to co-occurring and undertreated chronic posttraumatic stress disorder, major depressive disorder and generalized anxiety disorder. This evaluation suggests currently moderate symptoms of depression, overall limited in emotional regulation skills, current intrusive thoughts, memories images and emotional distress related to both childhood and adult traumatic events. She is currently followed by psychiatry Dr. Elie Hess and has been using nightly lorazepam for insomnia for years. She states that she has not over the course of many years working with psychiatry been introduced to other possible options for treating insomnia, either pharmacologic or behavioral. She has not had extended or intensive trauma informed therapy or general therapy to address chronic depression and anxiety.    Although she reports that she has in the past  been diagnosed with restless leg syndrome, she has not experienced a motor restlessness or other cardinal signs or symptoms for a number of years. There is no history of anemia or peripheral neuropathy. Patient presents with low risk for obstructive sleep apnea without snoring, witnessed apnea or excessive tiredness or daytime sleepiness.    Insomnia appears to be largely due to co-occurring mental health factors. However there are strategies, listed below that may be helpful in improving sleep quality and may also benefit mood regulation.    Recommendations and Counselin. Mental health referral placed to initiate trauma informed therapy for PTSD along with major depressive disorder and generalized anxiety disorder.     2. Patient will continue to follow with her psychiatrist, Dr. Terrance Hess for management medication and sleep medications.    3. The primary recommended behavioral intervention is sleep compression, reducing current time in bed from 12 hours to a consistent sleep study schedule of midnight-8 AM and limiting total time in bed to 8 hours. Patient agreed to continue to avoid daytime napping.    4. We did review basic sleep hygiene strategies including avoiding use of screens and devices an hour before bed and ensuring that she has a relaxing evening routine.    5. Behavioral activation was encouraged in the morning. We discussed strategies to build in some structure in the morning such as going to the mall to walk, going to the library, etc. she was also encouraged to transition from bed clothing to daytime clothing when she gets rather than staying in bed clothing throughout the day.    Leeanne was provided information about the pathophysiology of insomnia, psychophysiological factors contributing to the onset and maintenance of insomnia and how co-occurring medical conditions and intrinsic sleep disorders can affect sleep. Treatment options were discussed as applicable to patient specific  sleep concerns and symptoms. The benefits and potential early side effects of treatment including increased daytime sleepiness were discussed.    Patient was advised to consult with their prescribing provider around use of or changes to prescription sleep medication. Patient was counseled on the importance of avoiding driving if drowsy.         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 a theatre or a meeting) Slight chance of dozing   As a passenger in a car for an hour without a break Would never doze   Lying down to rest in the afternoon when circumstances permit Would never doze   Sitting and talking to someone Would never doze   Sitting quietly after a lunch without alcohol Would never doze   In a car, while stopped for a few minutes in traffic Would never doze   Pixley Score (MC) 1   Pixley Score (Sleep) 1         INSOMNIA SEVERITY INDEX (SEBASTIÁN)          4/5/2024    10:29 AM   Insomnia Severity Index (SEBASTIÁN)   Difficulty falling asleep 2   Difficulty staying asleep 3   Problems waking up too early 4   How SATISFIED/DISSATISFIED are you with your CURRENT sleep pattern? 4   How NOTICEABLE to others do you think your sleep problem is in terms of impairing the quality of your life? 4   How WORRIED/DISTRESSED are you about your current sleep problem? 4   To what extent do you consider your sleep problem to INTERFERE with your daily functioning (e.g. daytime  fatigue, mood, ability to function at work/daily chores, concentration, memory, mood, etc.) CURRENTLY? 4   SEBASTIÁN Total Score 25       Guidelines for Scoring/Interpretation:  Total score categories:  0-7 = No clinically significant insomnia   8-14 = Subthreshold insomnia   15-21 = Clinical insomnia (moderate severity)  22-28 = Clinical insomnia (severe)  Used via courtesy of www.Plugaroundealth.va.gov with permission from Mikey Piper PhD., [facility]      STOP BANG         4/12/2024     3:08 PM   STOP BANG Questionnaire (  2008, the American Society of Anesthesiologists, Inc. [employer]   B/P Clinic: 123/78   BMI Clinic: 18.68             PATIENT HEALTH QUESTIONNAIRE-9 (PHQ - 9)        4/12/2024     3:09 PM   PHQ-9 ([employer])   1.  Little interest or pleasure in doing things 2   2.  Feeling down, depressed, or hopeless 3   3.  Trouble falling or staying asleep, or sleeping too much 0   4.  Feeling tired or having little energy 1   5.  Poor appetite or overeating 3   6.  Feeling bad about yourself - or that you are a failure or have let yourself or your family down 3   7.  Trouble concentrating on things, such as reading the newspaper or watching television 0   8.  Moving or speaking so slowly that other people could have noticed. Or the opposite - being so fidgety or restless that you have been moving around a lot more than usual 0   9.  Thoughts that you would be better off dead, or of hurting yourself in some way 0   PHQ-9 Total Score 12   If you checked off any problems, how difficult have these problems made it for you to do your work, take care of things at home, or get along with other people? Not difficult at all   6.  Feeling bad about yourself 3   7.  Trouble concentrating 0   8.  Moving slowly or restless 0   9.  Suicidal or self-harm thoughts 0   Difficulty at work, home, or with people Not difficult at all       Developed by Lizzie Obando, Gilmar Wilson  "Jey and colleagues, with an educational roberto from [employer] No permission required to reproduce, translate, display or distribute.        Allergies:    Allergies   Allergen Reactions    Chocolate     Orange     Gabapentin      Confusion              Problem List:     Patient Active Problem List   Diagnosis    Inflammatory autoimmune disorder (H24)    Raynaud's disease    Right-sided low back pain with right-sided sciatica    Primary insomnia    Osteoporosis    Degenerative scoliosis in adult patient    Anterolisthesis    Fracture of eleventh thoracic vertebra (H)    Generalized anxiety disorder    Major depression, recurrent (H24)    Tubular adenoma of colon    Status post lumbar spine surgery for decompression of spinal cord            MEDICATIONS:     Current Outpatient Medications   Medication Sig Dispense Refill    Ascorbic Acid (VITAMIN C) 500 MG CAPS Take  by mouth daily.      calcium citrate (CITRACAL) 950 (200 Ca) MG tablet       denosumab (PROLIA) 60 MG/ML SOSY injection Inject 60 mg Subcutaneous      DULoxetine (CYMBALTA) 30 MG capsule 40 mg      DULoxetine (CYMBALTA) 60 MG capsule       LORazepam (ATIVAN) 0.5 MG tablet Take 0.5 mg by mouth daily         Problem List:  Patient Active Problem List    Diagnosis Date Noted    Status post lumbar spine surgery for decompression of spinal cord 04/03/2019     Priority: Medium    Degenerative scoliosis in adult patient 02/05/2019     Priority: Medium    Anterolisthesis 02/05/2019     Priority: Medium    Fracture of eleventh thoracic vertebra (H) 06/27/2017     Priority: Medium    Major depression, recurrent (H24) 05/03/2017     Priority: Medium    Tubular adenoma of colon 05/03/2017     Priority: Medium     Overview:   \"Advanced adenoma\" biopsy from colonoscopy 10/10      Osteoporosis 06/30/2016     Priority: Medium    Inflammatory autoimmune disorder (H24) 03/21/2016     Priority: Medium     Diagnosed in 1994 by a dermatologist at the [facility]    Overview: "   Overview:   Diagnosed in 1994 by a dermatologist at the [facility]      Raynaud's disease 03/21/2016     Priority: Medium    Right-sided low back pain with right-sided sciatica 03/21/2016     Priority: Medium     Having EMG by neurology 2016.      Primary insomnia 03/21/2016     Priority: Medium    Generalized anxiety disorder 01/12/2010     Priority: Medium        Past Medical/Surgical History:  Past Medical History:   Diagnosis Date    Anxiety     Depression     Hyponatremia 05/16/2017    Osteoporosis     Vitamin D deficiency     Wedge compression fracture of unspecified thoracic vertebra, sequela 06/30/2016     Past Surgical History:   Procedure Laterality Date    CATARACT EXTRACTION, BILATERAL  2014    CATARACT IOL, RT/LT  2014    HARVEST BONE MARROW FROM ILIAC CREST Right 4/3/2019    Procedure: Reeders Bone Marrow From Iliac Crest;  Surgeon: Lane Ventura MD;  Location: [facility] OR    KIDNEY STONE SURGERY      OPTICAL TRACKING SYSTEM FUSION SPINE POSTERIOR LUMBAR THREE+ LEVELS N/A 4/3/2019    Procedure: Lumbar 3-4 Decompression, Lumbar 4-Sacral 1 Transforaminal Lumbar Interbody Fusion, Lumbar 5-Sacral 1 Kaminski Maxwell Osteotomy, Lumbar 4-Pelvis Instrumentation, Illiac Crest Autograft;  Surgeon: Lane Ventura MD;  Location: [facility] OR    OTHER SURGICAL HISTORY      HAMSTRING SURGERY       Social History:  Social History     Socioeconomic History    Marital status:      Spouse name: Not on file    Number of children: Not on file    Years of education: Not on file    Highest education level: Not on file   Occupational History    Not on file   Tobacco Use    Smoking status: Never    Smokeless tobacco: Never   Vaping Use    Vaping status: Never Used   Substance and Sexual Activity    Alcohol use: No     Comment: 7 per week    Drug use: No    Sexual activity: Never   Other Topics Concern    Parent/sibling w/ CABG, MI or angioplasty before 65F 55M? Not Asked   Social History Narrative    How  much exercise per week? 1/2 -1 hr daily    How much calcium per day? Supplement + 3 glasses milk       How much caffeine per day? 0    How much vitamin D per day? supplement    Do you/your family wear seatbelts?  Yes    Do you/your family use safety helmets? No    Do you/your family use sunscreen? Yes    Do you/your family keep firearms in the home? No    Do you/your family have a smoke detector(s)? Yes        Do you feel safe in your home? Yes    Has anyone ever touched you in an unwanted manner? No     Explain         November 13, 2014 Aida Yoder LPN             Social Determinants of Health     Financial Resource Strain: Low Risk  (12/21/2023)    Financial Resource Strain     Within the past 12 months, have you or your family members you live with been unable to get utilities (heat, electricity) when it was really needed?: No   Food Insecurity: Low Risk  (12/21/2023)    Food Insecurity     Within the past 12 months, did you worry that your food would run out before you got money to buy more?: No     Within the past 12 months, did the food you bought just not last and you didn t have money to get more?: No   Transportation Needs: Low Risk  (12/21/2023)    Transportation Needs     Within the past 12 months, has lack of transportation kept you from medical appointments, getting your medicines, non-medical meetings or appointments, work, or from getting things that you need?: No   Physical Activity: Not on file   Stress: Not on file   Social Connections: Not on file   Interpersonal Safety: Low Risk  (1/15/2024)    Interpersonal Safety     Do you feel physically and emotionally safe where you currently live?: Yes     Within the past 12 months, have you been hit, slapped, kicked or otherwise physically hurt by someone?: No     Within the past 12 months, have you been humiliated or emotionally abused in other ways by your partner or ex-partner?: No   Housing Stability: Low Risk  (12/21/2023)    Housing Stability      Do you have housing? : Yes     Are you worried about losing your housing?: No       Family History:  Family History   Problem Relation Age of Onset    Colon Cancer Maternal Grandmother     Colon Cancer Maternal Grandfather     High cholesterol Mother     Hypertension Mother     Myocardial Infarction Mother 72    High cholesterol Father     Hypertension Father     Myocardial Infarction Father 76    Acute Myocardial Infarction Mother     Hyperlipidemia Mother     Alcoholism Mother     Arthritis Mother     Depression Mother     Acute Myocardial Infarction Father     Hyperlipidemia Father     Alcoholism Father     Prostate Cancer Father     Diabetes Sister     Alcoholism Sister     Heart Disease Paternal Uncle     Alcoholism Paternal Uncle     Arthritis Maternal Grandmother     Alcoholism Brother     Dementia Brother     No Known Problems Son     Alcoholism Maternal Uncle     Alcoholism Paternal Aunt        Review of Systems:  A complete review of systems reviewed by me is negative with the exeption of what has been mentioned in the history of present illness.             Physical Examination:     GENERAL: alert and no distress  EYES: Eyes grossly normal to inspection.  No discharge or erythema, or obvious scleral/conjunctival abnormalities.  RESP: No audible wheeze, cough, or visible cyanosis.    SKIN: Visible skin clear. No significant rash, abnormal pigmentation or lesions.  NEURO: Cranial nerves grossly intact.  Mentation and speech appropriate for age.  PSYCH: Appropriate affect, tone, and pace of words                  Data: All pertinent previous laboratory data reviewed     Recent Labs   Lab Test 01/15/24  1526 11/09/22  1343    138   POTASSIUM 5.1 4.7   CHLORIDE 99 94*   CO2 30* 34*   ANIONGAP 8 10   GLC 96 88   BUN 11.7 9.3   CR 0.82 1.01*   LIVE 9.5 9.8       Recent Labs   Lab Test 01/15/24  1526   WBC 4.9   RBC 5.10   HGB 16.3*   HCT 50.1*   MCV 98   MCH 32.0   MCHC 32.5   RDW 13.1     "      Recent Labs   Lab Test 01/15/24  1526   PROTTOTAL 7.5   ALBUMIN 4.0   BILITOTAL 0.7   ALKPHOS 76   AST 33   ALT 13       TSH   Date Value   03/11/2022 2.65 uIU/mL   07/11/2016 5.34 mU/L (H)   04/26/2010 4.20 mU/L       No results found for: \"UAMP\", \"UBARB\", \"BENZODIAZEUR\", \"UCANN\", \"UCOC\", \"OPIT\", \"UPCP\"    Ferritin   Date/Time Value Ref Range Status   01/27/2009 02:17  (H) 10 - 300 ng/mL Final           RAND BRICE MD 4/30/2024     Total time spent reviewing medical records including previous testing and interpretation as well as direct patient contact and documentation on this date: 45 minutes    "

## 2024-04-30 NOTE — NURSING NOTE
Is the patient currently in the state of [place]? YES    Visit mode:VIDEO    If the visit is dropped, the patient can be reconnected by: VIDEO VISIT: Send to e-mail at: [email or web address]    Will anyone else be joining the visit? NO  (If patient encounters technical issues they should call [phone]    How would you like to obtain your AVS? MyChart    Are changes needed to the allergy or medication list? Pt stated no changes to allergies and Pt stated no med changes    Are refills needed on medications prescribed by this physician? NO  Has patient had flu shot for current/most recent flu season? If so, when? Yes: 10/31/2023  Depression Response    Patient completed the PHQ-9 assessment for depression and scored >9? Yes-12  Question 9 on the PHQ-9 was positive for suicidality? No  Does patient have current mental health provider? Yes    Is this a virtual visit? Yes   Does patient have suicidal ideation (positive question 9)? No - offer to place Mental Health Referral.  Patient declined referral/not needed-has a mental health care provider    I personally notified the following: visit provider              Reason for visit: Consult    Myah BRADSHAW

## 2024-05-31 DIAGNOSIS — M81.0 AGE-RELATED OSTEOPOROSIS WITHOUT CURRENT PATHOLOGICAL FRACTURE: Primary | ICD-10-CM

## 2024-05-31 DIAGNOSIS — Z92.29 PERSONAL HISTORY OF OTHER DRUG THERAPY: ICD-10-CM

## 2024-06-13 ENCOUNTER — OFFICE VISIT (OUTPATIENT)
Dept: ORTHOPEDICS | Facility: CLINIC | Age: 74
End: 2024-06-13
Payer: COMMERCIAL

## 2024-06-13 ENCOUNTER — ANCILLARY PROCEDURE (OUTPATIENT)
Dept: GENERAL RADIOLOGY | Facility: CLINIC | Age: 74
End: 2024-06-13
Attending: FAMILY MEDICINE
Payer: COMMERCIAL

## 2024-06-13 ENCOUNTER — TELEPHONE (OUTPATIENT)
Dept: ORTHOPEDICS | Facility: CLINIC | Age: 74
End: 2024-06-13

## 2024-06-13 ENCOUNTER — PRE VISIT (OUTPATIENT)
Dept: ORTHOPEDICS | Facility: CLINIC | Age: 74
End: 2024-06-13

## 2024-06-13 ENCOUNTER — ANCILLARY PROCEDURE (OUTPATIENT)
Dept: GENERAL RADIOLOGY | Facility: CLINIC | Age: 74
End: 2024-06-13
Attending: STUDENT IN AN ORGANIZED HEALTH CARE EDUCATION/TRAINING PROGRAM
Payer: COMMERCIAL

## 2024-06-13 DIAGNOSIS — M25.511 RIGHT SHOULDER PAIN, UNSPECIFIED CHRONICITY: Primary | ICD-10-CM

## 2024-06-13 DIAGNOSIS — M62.830 SPASM OF RIGHT TRAPEZIUS MUSCLE: ICD-10-CM

## 2024-06-13 DIAGNOSIS — M54.2 NECK PAIN: ICD-10-CM

## 2024-06-13 DIAGNOSIS — M19.011 OSTEOARTHRITIS OF GLENOHUMERAL JOINT, RIGHT: ICD-10-CM

## 2024-06-13 DIAGNOSIS — M25.511 RIGHT SHOULDER PAIN, UNSPECIFIED CHRONICITY: ICD-10-CM

## 2024-06-13 DIAGNOSIS — M43.12 SPONDYLOLISTHESIS OF CERVICAL REGION: Primary | ICD-10-CM

## 2024-06-13 PROCEDURE — 99204 OFFICE O/P NEW MOD 45 MIN: CPT | Mod: GC | Performed by: FAMILY MEDICINE

## 2024-06-13 PROCEDURE — 72050 X-RAY EXAM NECK SPINE 4/5VWS: CPT | Mod: GC | Performed by: STUDENT IN AN ORGANIZED HEALTH CARE EDUCATION/TRAINING PROGRAM

## 2024-06-13 PROCEDURE — 73030 X-RAY EXAM OF SHOULDER: CPT | Mod: RT | Performed by: RADIOLOGY

## 2024-06-13 RX ORDER — DICLOFENAC SODIUM 75 MG/1
75 TABLET, DELAYED RELEASE ORAL 2 TIMES DAILY WITH MEALS
Qty: 60 TABLET | Refills: 1 | Status: SHIPPED | OUTPATIENT
Start: 2024-06-13

## 2024-06-13 NOTE — PROGRESS NOTES
ASSESSMENT & PLAN    Leeanne was seen today for pain.    Diagnoses and all orders for this visit:    Spondylolisthesis of cervical region  Neck pain  74F w/ neck pain and trap spasm w/ TTP over C-spinous processes. Xray with multilevel spondylolisthesis at C5-6 and concern for cervical instability. Reviewed and discussed with spine surgery (Dr Ardon) who recommend further evaluation with CT and MRI w/ close follow up with their team.  -     X-ray Cervical Spine G/E 4 Views; Future  -     CT Cervical Spine w/o Contrast; Future  -     MRI Cervical spine w/o contrast; Future    Spasm of right trapezius muscle  -     diclofenac (VOLTAREN) 75 MG EC tablet; Take 1 tablet (75 mg) by mouth 2 times daily (with meals)    Osteoarthritis of glenohumeral joint, right  OA of the GH and AC joint on xray. AC joint not primary cause of sx on exam today. Predominant pain is from trap/ cervical neck spasm. Unclear how much of this spasm is related to GH OA vs C-spine issues as above. Regardless, we will treat pain with NSAID course and begin PT for shoulder rehab. Follow-up with our team in 4-6 weeks for this issue.   -     diclofenac (VOLTAREN) 75 MG EC tablet; Take 1 tablet (75 mg) by mouth 2 times daily (with meals)  -     Physical Therapy  Referral; Future    Osteopenia  - did not discuss, but needs follow-up w/ Dr. Peterson for recheck of bone health    Options for treatment and/or follow-up care were reviewed with the patient was actively involved in the decision making process. Patient verbalized understanding and was in agreement with the plan.    The patient was seen by and discussed with MD VANESSA Nath MD  Primary Care Sports Medicine Fellow  [facility]      -----  Chief Complaint   Patient presents with    Right Shoulder - Pain       SUBJECTIVE  Leeanne Duarte is a/an 74 year old female who is seen as a self referral for evaluation of right shoulder pain.     The patient  is seen by themselves.  The patient is Right handed    Onset: 6 month(s) ago. Reports insidious onset without acute precipitating event, but worse after a slip one ice and fell backward onto buttock, but did not notice worse pain immediately after or that day  Location of Pain: right shoulder; anterior   Worsened by: shoulder flexion and extension   Treatments tried: no treatment tried to date  Associated symptoms: no distal numbness or tingling; denies swelling or warmth    Orthopedic/Surgical history: History of reoccurring dislocations of bilateral shoulders   Social History/Occupation: Retired       REVIEW OF SYSTEMS:  10-point review of systems per HPI, otherwise negative.       OBJECTIVE:  There were no vitals taken for this visit.   General: healthy, alert and in no distress  Skin: no suspicious lesions or rash.  CV: distal perfusion intact  Resp: normal respiratory effort without conversational dyspnea   Psych: normal mood and affect  Gait: NORMAL  Neuro: Normal light sensory exam of bilateral upper extremies    Musculoskeletal - Neck/ R shoulder  - inspection: prominence of R trap, kyphoscoliotic curvature of thoracic spine  - palpation: TTP over c-spinous processes, R trap, paracervical muscles, scalenes, supra and infraspinatus ant shoulder capsule, RC insertion, NTTP bicipital groove, AC joint, lateral deltoid  - ROM:  neck: full ROM of neck w/ pain on lateral flexion b/l. Shouler: IR limited (~L2) and painful compared to L (~T6), pain with full flexion, non-painful ER,  - strength: 5/5  strength, 4+/5 in all shoulder planes w/ pain  - special tests:  (+) Chad's  (+) Neer  (-) Hawkin's  (+) apprehension/ relocation  (-) Spurling's bilaterally       RADIOLOGY:  Final results and radiologist's interpretation, available in the [facility] health record.  Images were reviewed with the patient in the office today.  My personal interpretation of the performed imaging:     R Shoulder: advanced R shoulder GH OA and  moderate AC OA    C-spine:significant DJD w/ bone on bone OA of lower C-spine and anterolisthesis.

## 2024-06-13 NOTE — PROGRESS NOTES
I have personally reviewed the history and examination as documented by Dr. Bauman.  I was present during key portions of the visit and agree with the assessment and plan as documented for 74 yr old female here for R shoulder and trapezius pain. Shoulder w/ severe GH OA but most concerning is her severe degenerative c-spine disease w/ listhesis which appears unstable on flexion/extension views. Will check CT/ MRI and refer to spine surgery. Will tx shoulder w/ PT/ nsaids and follow. Precautions given. Anticipatory guidance given.     Anthony Allen MD  June 13, 2024  2:36 PM

## 2024-06-13 NOTE — TELEPHONE ENCOUNTER
Called Pt and LVM for a call back to schedule a appt with Dr Ardon for cervical spine instability, Dr Ardon has 2 openings on 6/27/24.  Lui RICH

## 2024-06-13 NOTE — LETTER
6/13/2024      RE: Leeanne Duarte  [address]  [place] [address]     Dear Colleague,    Thank you for referring your patient, Leeanne Duarte, to the [facility] [place]. Please see a copy of my visit note below.    ASSESSMENT & PLAN    Leeanne was seen today for pain.    Diagnoses and all orders for this visit:    Spondylolisthesis of cervical region  Neck pain  74F w/ neck pain and trap spasm w/ TTP over C-spinous processes. Xray with multilevel spondylolisthesis at C5-6 and concern for cervical instability. Reviewed and discussed with spine surgery (Dr Ardon) who recommend further evaluation with CT and MRI w/ close follow up with their team.  -     X-ray Cervical Spine G/E 4 Views; Future  -     CT Cervical Spine w/o Contrast; Future  -     MRI Cervical spine w/o contrast; Future    Spasm of right trapezius muscle  -     diclofenac (VOLTAREN) 75 MG EC tablet; Take 1 tablet (75 mg) by mouth 2 times daily (with meals)    Osteoarthritis of glenohumeral joint, right  OA of the GH and AC joint on xray. AC joint not primary cause of sx on exam today. Predominant pain is from trap/ cervical neck spasm. Unclear how much of this spasm is related to GH OA vs C-spine issues as above. Regardless, we will treat pain with NSAID course and begin PT for shoulder rehab. Follow-up with our team in 4-6 weeks for this issue.   -     diclofenac (VOLTAREN) 75 MG EC tablet; Take 1 tablet (75 mg) by mouth 2 times daily (with meals)  -     Physical Therapy  Referral; Future    Osteopenia  - did not discuss, but needs follow-up w/ Dr. Peterson for recheck of bone health    Options for treatment and/or follow-up care were reviewed with the patient was actively involved in the decision making process. Patient verbalized understanding and was in agreement with the plan.    The patient was seen by and discussed with MD VANESSA Nath MD  Primary  Care Sports Medicine Fellow  [facility]      -----  Chief Complaint   Patient presents with     Right Shoulder - Pain       SUBJECTIVE  Leeanne Duarte is a/an 74 year old female who is seen as a self referral for evaluation of right shoulder pain.     The patient is seen by themselves.  The patient is Right handed    Onset: 6 month(s) ago. Reports insidious onset without acute precipitating event, but worse after a slip one ice and fell backward onto buttock, but did not notice worse pain immediately after or that day  Location of Pain: right shoulder; anterior   Worsened by: shoulder flexion and extension   Treatments tried: no treatment tried to date  Associated symptoms: no distal numbness or tingling; denies swelling or warmth    Orthopedic/Surgical history: History of reoccurring dislocations of bilateral shoulders   Social History/Occupation: Retired       REVIEW OF SYSTEMS:  10-point review of systems per HPI, otherwise negative.       OBJECTIVE:  There were no vitals taken for this visit.   General: healthy, alert and in no distress  Skin: no suspicious lesions or rash.  CV: distal perfusion intact  Resp: normal respiratory effort without conversational dyspnea   Psych: normal mood and affect  Gait: NORMAL  Neuro: Normal light sensory exam of bilateral upper extremies    Musculoskeletal - Neck/ R shoulder  - inspection: prominence of R trap, kyphoscoliotic curvature of thoracic spine  - palpation: TTP over c-spinous processes, R trap, paracervical muscles, scalenes, supra and infraspinatus ant shoulder capsule, RC insertion, NTTP bicipital groove, AC joint, lateral deltoid  - ROM:  neck: full ROM of neck w/ pain on lateral flexion b/l. Shouler: IR limited (~L2) and painful compared to L (~T6), pain with full flexion, non-painful ER,  - strength: 5/5  strength, 4+/5 in all shoulder planes w/ pain  - special tests:  (+) Chad's  (+) Neer  (-) Hawkin's  (+) apprehension/ relocation  (-)  Spurling's bilaterally       RADIOLOGY:  Final results and radiologist's interpretation, available in the [facility] health record.  Images were reviewed with the patient in the office today.  My personal interpretation of the performed imaging:     R Shoulder: advanced R shoulder GH OA and moderate AC OA    C-spine:significant DJD w/ bone on bone OA of lower C-spine and anterolisthesis.        I have personally reviewed the history and examination as documented by Dr. Bauman.  I was present during key portions of the visit and agree with the assessment and plan as documented for 74 yr old female here for R shoulder and trapezius pain. Shoulder w/ severe GH OA but most concerning is her severe degenerative c-spine disease w/ listhesis which appears unstable on flexion/extension views. Will check CT/ MRI and refer to spine surgery. Will tx shoulder w/ PT/ nsaids and follow. Precautions given. Anticipatory guidance given.     Anthony Allen MD  June 13, 2024  2:36 PM      Again, thank you for allowing me to participate in the care of your patient.      Sincerely,    Omega Klein MD

## 2024-06-13 NOTE — TELEPHONE ENCOUNTER
DIAGNOSIS: (R) shoulder and collar bone pain / self ref / [facility] / no images or surg (patient would like an XR)     APPOINTMENT DATE: 6.13.24   NOTES STATUS DETAILS   MEDICATION LIST Internal

## 2024-06-14 ENCOUNTER — THERAPY VISIT (OUTPATIENT)
Dept: PHYSICAL THERAPY | Facility: CLINIC | Age: 74
End: 2024-06-14
Attending: FAMILY MEDICINE
Payer: COMMERCIAL

## 2024-06-14 DIAGNOSIS — M19.011 OSTEOARTHRITIS OF GLENOHUMERAL JOINT, RIGHT: ICD-10-CM

## 2024-06-14 PROCEDURE — 97110 THERAPEUTIC EXERCISES: CPT | Mod: GP | Performed by: PHYSICAL THERAPIST

## 2024-06-14 PROCEDURE — 97161 PT EVAL LOW COMPLEX 20 MIN: CPT | Mod: GP | Performed by: PHYSICAL THERAPIST

## 2024-06-14 ASSESSMENT — ACTIVITIES OF DAILY LIVING (ADL)
REMOVING_SOMETHING_FROM_YOUR_BACK_POCKET: 4
PUTTING_ON_YOUR_PANTS: 5
PUTTING_ON_AN_UNDERSHIRT_OR_A_PULLOVER_SWEATER: 7
PLEASE_INDICATE_YOR_PRIMARY_REASON_FOR_REFERRAL_TO_THERAPY:: SHOULDER
PLACING_AN_OBJECT_ON_A_HIGH_SHELF: 9
WASHING_YOUR_HAIR?: 5
CARRYING_A_HEAVY_OBJECT_OF_10_POUNDS: 8
WHEN_LYING_ON_THE_INVOLVED_SIDE: 8
REACHING_FOR_SOMETHING_ON_A_HIGH_SHELF: 9
TOUCHING_THE_BACK_OF_YOUR_NECK: 8
PUSHING_WITH_THE_INVOLVED_ARM: 8
WASHING_YOUR_BACK: 8
PUTTING_ON_A_SHIRT_THAT_BUTTONS_DOWN_THE_FRONT: 5
AT_ITS_WORST?: 8

## 2024-06-14 NOTE — PROGRESS NOTES
PHYSICAL THERAPY EVALUATION  Type of Visit: Evaluation       Fall Risk Screen:  Fall screen completed by: PT  Have you fallen 2 or more times in the past year?: No  Have you fallen and had an injury in the past year?: No  Is patient a fall risk?: No    Therapist Impression:   Leeanne presents with RC pathology, OA and postural issues as a result of neck OA.  Our focus will be on RC strengthening and posture retraining    NEXT: review how changing sleeping positions went, pull downs    PTRX: handouts    GOALS: reaching    Subjective       Presenting condition or subjective complaint: soreness in right shoulder area, hx of R shoulder dislocations  Date of onset: 12/14/23    Relevant medical history: Arthritis; Bladder or bowel problems; Depression   Dates & types of surgery: back surgery 2019   avulsed hamstring  2015     Prior diagnostic imaging/testing results: X-ray     Prior therapy history for the same diagnosis, illness or injury:        Prior Level of Function  Transfers: Independent  Ambulation: Independent  ADL: Independent  IADL:     Living Environment  Social support: With a significant other or spouse   Type of home: House; 2-story; Basement   Stairs to enter the home: Yes 2 Is there a railing: Yes     Ramp: No   Stairs inside the home: Yes 13 Is there a railing: Yes     Help at home: None  Equipment owned:       Employment: No    Hobbies/Interests: reading    Patient goals for therapy: walk without shoulder pain    Pain assessment: Stabbing pain when shrugging shoulders.  Reaching out and away from body is painful.  Leaning forward and not moving arms when walking feels better.  Lying flat on a pillow hurts.  This is disrupting her sleep.     Objective     STATIC POSTURE  Forward head: moderate   Rounded shoulders:moderate  Shoulder internally rotated: mild   Visual inspection: Depressed scapula R     SHOULDER RANGE OF MOTION  Pain with OH flexion and painful arc R    SHOULDER STRENGTH  MMT Right Left    Flexion 4p/5 4+/5   Abduction 4+/5 4+/5   ER 4p/5 4+/5   IR 5/5 5/5   P= pain    SPECIAL TESTS    PALPATION  Left: Not assessed  Right: Not assessed      Assessment & Plan   CLINICAL IMPRESSIONS  Medical Diagnosis: R shoulder pain    Treatment Diagnosis: R shoulder pain   Impression/Assessment: Patient is a 74 year old female with R shoulder complaints.  The following significant findings have been identified: Pain, Decreased ROM/flexibility, Decreased strength, Impaired muscle performance, Decreased activity tolerance, and Impaired posture. These impairments interfere with their ability to perform self care tasks, work tasks, and household chores as compared to previous level of function.     Clinical Decision Making (Complexity):  Clinical Presentation: Stable/Uncomplicated  Clinical Presentation Rationale: based on medical and personal factors listed in PT evaluation  Clinical Decision Making (Complexity): Low complexity    PLAN OF CARE  Treatment Interventions:  Interventions: Manual Therapy, Neuromuscular Re-education, Therapeutic Activity, Therapeutic Exercise, Self-Care/Home Management    Long Term Goals     PT Goal 1  Goal Identifier: Reaching  Goal Description: Be able to reach away from body and overhead without pain  Rationale: to maximize safety and independence with performance of ADLs and functional tasks  Target Date: 09/11/24      Frequency of Treatment: 2 x month  Duration of Treatment: 3 months    Recommended Referrals to Other Professionals:   Education Assessment:        Risks and benefits of evaluation/treatment have been explained.   Patient/Family/caregiver agrees with Plan of Care.     Evaluation Time:     PT Eval, Low Complexity Minutes (71607): 15       Signing Clinician: Milton Waller, PT        [facility]                                                                                   OUTPATIENT PHYSICAL THERAPY      PLAN OF TREATMENT FOR OUTPATIENT  REHABILITATION   Patient's Last Name, First Name, Leeanne Cowart YOB: 1950   Provider's Name   [facility]   Medical Record No.  [id number]     Onset Date: 12/14/23  Start of Care Date: 06/14/24     Medical Diagnosis:  R shoulder pain      PT Treatment Diagnosis:  R shoulder pain Plan of Treatment  Frequency/Duration: 2 x month/ 3 months    Certification date from 06/14/24 to 09/11/24         See note for plan of treatment details and functional goals     Milton Waller, PT                         I CERTIFY THE NEED FOR THESE SERVICES FURNISHED UNDER        THIS PLAN OF TREATMENT AND WHILE UNDER MY CARE     (Physician attestation of this document indicates review and certification of the therapy plan).              Referring Provider:  Anthony Allen    Initial Assessment  See Epic Evaluation- Start of Care Date: 06/14/24

## 2024-06-14 NOTE — RESULT ENCOUNTER NOTE
Reviewed at time of visit. C-spine CT and MRI pending as well as spine surgery consult.    Anthony Allen MD  6/14/2024  9:04 AM

## 2024-06-18 ENCOUNTER — TELEPHONE (OUTPATIENT)
Dept: ORTHOPEDICS | Facility: CLINIC | Age: 74
End: 2024-06-18
Payer: COMMERCIAL

## 2024-06-18 DIAGNOSIS — M53.2X2 CERVICAL SPINE INSTABILITY: Primary | ICD-10-CM

## 2024-06-18 NOTE — TELEPHONE ENCOUNTER
Called pt and ANJANA mccloud a appt. With Dr Ardon was scheduled for on 6/27/24 at 10:00 and to arrive early for xrays.  Lui RICH

## 2024-06-20 ENCOUNTER — THERAPY VISIT (OUTPATIENT)
Dept: PHYSICAL THERAPY | Facility: CLINIC | Age: 74
End: 2024-06-20
Attending: FAMILY MEDICINE
Payer: COMMERCIAL

## 2024-06-20 DIAGNOSIS — M19.011 OSTEOARTHRITIS OF GLENOHUMERAL JOINT, RIGHT: Primary | ICD-10-CM

## 2024-06-20 PROCEDURE — 97530 THERAPEUTIC ACTIVITIES: CPT | Mod: GP

## 2024-06-20 PROCEDURE — 97140 MANUAL THERAPY 1/> REGIONS: CPT | Mod: GP

## 2024-06-20 PROCEDURE — 97110 THERAPEUTIC EXERCISES: CPT | Mod: GP

## 2024-06-24 ENCOUNTER — ALLIED HEALTH/NURSE VISIT (OUTPATIENT)
Dept: FAMILY MEDICINE | Facility: CLINIC | Age: 74
End: 2024-06-24
Payer: COMMERCIAL

## 2024-06-24 DIAGNOSIS — M81.0 OSTEOPOROSIS, UNSPECIFIED OSTEOPOROSIS TYPE, UNSPECIFIED PATHOLOGICAL FRACTURE PRESENCE: Primary | ICD-10-CM

## 2024-06-24 PROCEDURE — 96372 THER/PROPH/DIAG INJ SC/IM: CPT | Performed by: INTERNAL MEDICINE

## 2024-06-24 PROCEDURE — 99207 PR NO CHARGE NURSE ONLY: CPT

## 2024-06-24 NOTE — PROGRESS NOTES
Indication: Prolia  (denosumab) is a prescription medicine used to treat osteoporosis in patients who:   Are at high risk for fracture, meaning patients who have had a fracture related to osteoporosis, or who have multiple risk factors for fracture   Cannot use another osteoporosis medicine or other osteoporosis medicines did not work well   The timeline for early/late injections would be 4 weeks early and any time after the 6 month marisel. If a patient receives their injection late, then the subsequent injection would be 6 months from the date that they actually received the injection    1.  When was the last injection?  12/21/2023  2.  Did they check with their insurance for this calendar year?  Yes  3.  Is there an order in the chart?  Yes  4.  Has the patient had dental work involving the bone in the past month or will have work in the next 6 months?  No  5.  Did you have the patient wait 15 minutes after the injection?  Yes  6.  Remember to use .injection under the medication notes    The following steps were completed to comply with the REMS program for Prolia:  Reviewed information in the Medication Guide and Patient Counseling Chart, including the serious risks of Prolia  and the symptoms of each risk.  Advised patient to seek prompt medical attention if they have signs or symptoms of any of the serious risks.  Provided each patient a copy of the Medication Guide and Patient Brochure.    Clinic Administered Medication Documentation      Prolia Documentation    Indication: Prolia  (denosumab) is a prescription medicine used to treat osteoporosis in patients who:   Are at high risk for fracture, meaning patients who have had a fracture related to osteoporosis, or who have multiple risk factors for fracture.  Cannot use another osteoporosis medicine or other osteoporosis medicines did not work well.  The timeline for early/late injections would be 4 weeks early and any time after the 6 month marisel. If a patient  receives their injection late, then the subsequent injection would be 6 months from the date that they actually received the injection.    When was the last injection?  2023  Was the last injection at least 6 months ago? Yes  Has the prior authorization been completed?  Yes  Is there an active order (written within the past 365 days, with administrations remaining, not ) in the chart?  Yes   GFR Estimate   Date Value Ref Range Status   01/15/2024 75 >60 mL/min/1.73m2 Final   2020 >60 >60 mL/min/1.73m2 Final   2019 >90 >60 mL/min/[1.73_m2] Final     Comment:     Non  GFR Calc  Starting 2018, serum creatinine based estimated GFR (eGFR) will be   calculated using the Chronic Kidney Disease Epidemiology Collaboration   (CKD-EPI) equation.       Has patient had a GFR within the last 12 months? Yes   Is GFR under 30, or patient has a diagnosis of CKD4 or CKD5? No   Patient denies gastric bypass or parathyroid surgery in past 6 months? Yes - patient denies.   Patient denies dental work in the past two months involving drilling into the bone, such as implants/extractions, oral surgery or a tooth extraction that has not healed yet?  Yes  Patient denies plans for an emergency tooth extraction within the next week? Yes    The following steps were completed to comply with the REMS program for Prolia:  Reviewed information in the Medication Guide, including the serious risks of Prolia  and the symptoms of each risk.  Advised patient to seek prompt medical attention if they have signs or symptoms of any of the serious risks.  Provided each patient a copy of the Medication Guide and Patient Guide.    Prior to injection, verified patient identity using patient's name and date of birth. Medication was administered. Please see MAR and medication order for additional information. Patient instructed to remain in clinic for 15 minutes and report any adverse reaction to staff  immediately.    Vial/Syringe: Syringe  Was this medication supplied by the patient? No  Verified that the patient has refills remaining in their prescription.    Vane Buitrago, RN, BSN

## 2024-06-26 NOTE — TELEPHONE ENCOUNTER
DIAGNOSIS:   Spondylolisthesis of cervical region [M43.12]  - Primary  Neck pain [M54.2]  Spasm of right trapezius muscle [M62.830]  Osteoarthritis of glenohumeral joint, right [M19.011]   APPOINTMENT DATE: 06/27/2024   NOTES STATUS DETAILS   OFFICE NOTE from referring provider Internal 06/13/2024 - Omega Klein MD - [facility]   OPERATIVE REPORT Internal 04/03/2019 - Lumbar 3-4 Decompression, Lumbar 4-Sacral 1 Transforaminal Lumbar Interbody Fusion, Lumbar 5-Sacral 1 Kaminski Maxwell Osteotomy, Lumbar 4-Pelvis Instrumentation, Illiac Crest Autograft (DR. ANN)   (IMAGES & REPORTS) Internal

## 2024-06-27 ENCOUNTER — ANCILLARY PROCEDURE (OUTPATIENT)
Dept: GENERAL RADIOLOGY | Facility: CLINIC | Age: 74
End: 2024-06-27
Attending: ORTHOPAEDIC SURGERY
Payer: COMMERCIAL

## 2024-06-27 ENCOUNTER — OFFICE VISIT (OUTPATIENT)
Dept: ORTHOPEDICS | Facility: CLINIC | Age: 74
End: 2024-06-27
Payer: COMMERCIAL

## 2024-06-27 ENCOUNTER — PRE VISIT (OUTPATIENT)
Dept: ORTHOPEDICS | Facility: CLINIC | Age: 74
End: 2024-06-27

## 2024-06-27 VITALS — WEIGHT: 94 LBS | BODY MASS INDEX: 18.95 KG/M2 | HEIGHT: 59 IN

## 2024-06-27 DIAGNOSIS — M62.830 SPASM OF RIGHT TRAPEZIUS MUSCLE: ICD-10-CM

## 2024-06-27 DIAGNOSIS — M54.2 NECK PAIN: ICD-10-CM

## 2024-06-27 DIAGNOSIS — M43.12 SPONDYLOLISTHESIS OF CERVICAL REGION: ICD-10-CM

## 2024-06-27 DIAGNOSIS — M53.2X2 CERVICAL SPINE INSTABILITY: ICD-10-CM

## 2024-06-27 DIAGNOSIS — M19.011 OSTEOARTHRITIS OF GLENOHUMERAL JOINT, RIGHT: ICD-10-CM

## 2024-06-27 PROCEDURE — 99204 OFFICE O/P NEW MOD 45 MIN: CPT | Performed by: ORTHOPAEDIC SURGERY

## 2024-06-27 PROCEDURE — 77073 BONE LENGTH STUDIES: CPT | Performed by: STUDENT IN AN ORGANIZED HEALTH CARE EDUCATION/TRAINING PROGRAM

## 2024-06-27 PROCEDURE — 72082 X-RAY EXAM ENTIRE SPI 2/3 VW: CPT | Performed by: STUDENT IN AN ORGANIZED HEALTH CARE EDUCATION/TRAINING PROGRAM

## 2024-06-27 NOTE — LETTER
6/27/2024      Leeanne Duarte  [address]  [place] [address]      Dear Colleague,    Thank you for referring your patient, Leeanne Duarte, to the [facility] [place]. Please see a copy of my visit note below.    Spine Surgical Hx:  04/03/2019 - PISF L4-pevlis; TLIF-SPO L4-S1 (2 levels); Right posterior ICBG harvest; allograft (Lorenzo) for stenosis, scoliosis, spondylolisthesis with radiculopathy and neurogenic claudication.  [Implants: Medtronic Solera and Ballast screw system; Capstone Control cages].      In-Person Visit    REASON FOR CONSULTATION: Consult (NEW CERVICAL SPINE)     REFERRING PHYSICIAN: Omega Klein  PCP:Jessie Jefferson      History of Present Illness:  74 year old female, referred by Dr. Omega Klein (PCP?) for cervical spine instability.  Had previously been seen by my partner Dr. Lane Ventura; last visit 12/31/19 (> 3 yrs ago; thus, this is still considered a new patient visit).  At that time, Dr. Ventura saw her mainly for R thigh and groin symptoms; and thus not for her neck.    Leeanne presents today at the request of her primary care doctor after obtaining cervical spine x-rays.  Today she reports a constant dull ache in her neck.  However most of her symptoms seem to be coming from the right shoulder.  She reports difficulty with overhead activities and using her right shoulder.  She reports pain at night.  She denies any changes in her balance or hand function.  She reports that her hands are arthritic and she has pain and difficulty secondary to the degenerative changes.  She denies any other radiating arm pain.  She denies any pain in her back or legs.  She denies any recent falls.  No additional complaints or concerns reported at this time.    Neck 60%, Arm 40%,  Right Only  Worse: Her right shoulder pain is worse with activity especially overhead activities  Better: None    Previous treatment:   She has been  participating in dedicated physical therapy for her right shoulder which seems to have provided significant relief.    Previous Injections:  None    Oswestry (BEENA) Questionnaire        12/31/2019    12:53 PM   OSWESTRY DISABILITY INDEX   Count 7   Sum 13   Oswestry Score (%) 37.14 %      BEEAN 12/31/19 37.14%      Neck Disability Index (NDI) Questionnaire        6/22/2024     8:22 PM   Neck Disability Index (NDI)   Neck Disability Index: Count 10   NDI: Total Score = SUM (points for all 10 findings) 14   Neck Disability in Percent = (Total Score) / 50 * 100 28 (%)      NDI 6/22/24 28%      Visual Analog Pain Scale  Back Pain Scale 0-10: 2 (low back pain)  Right leg pain: 0  Left leg pain: 0  Neck Pain Scale 0-10: 2  Right arm pain: 0  Left arm pain: 0    PROMIS-10 Scores  Global Mental Health Score: (P) 10  Global Physical Health Score: (P) 13  PROMIS TOTAL - SUBSCORES: (P) 23    ROS:  A 12-point review of systems was completed and is negative except for otherwise noted above in the history of present illness.    Med Hx:  Past Medical History:   Diagnosis Date    Anxiety     Depression     Hyponatremia 05/16/2017    Osteoporosis     Vitamin D deficiency     Wedge compression fracture of unspecified thoracic vertebra, sequela 06/30/2016       Surg Hx:  Past Surgical History:   Procedure Laterality Date    CATARACT EXTRACTION, BILATERAL  2014    CATARACT IOL, RT/LT  2014    HARVEST BONE MARROW FROM ILIAC CREST Right 4/3/2019    Procedure: [place] Bone Marrow From Iliac Crest;  Surgeon: Lane Ventura MD;  Location: [facility] OR    KIDNEY STONE SURGERY      OPTICAL TRACKING SYSTEM FUSION SPINE POSTERIOR LUMBAR THREE+ LEVELS N/A 4/3/2019    Procedure: Lumbar 3-4 Decompression, Lumbar 4-Sacral 1 Transforaminal Lumbar Interbody Fusion, Lumbar 5-Sacral 1 Kaminski Maxwell Osteotomy, Lumbar 4-Pelvis Instrumentation, Illiac Crest Autograft;  Surgeon: Lane Ventura MD;  Location: [facility] OR    OTHER SURGICAL HISTORY       HAMSTRING SURGERY       Allergies:  Allergies   Allergen Reactions    Chocolate     Orange     Gabapentin      Confusion         Meds:  Current Outpatient Medications   Medication Sig Dispense Refill    calcium citrate (CITRACAL) 950 (200 Ca) MG tablet       diclofenac (VOLTAREN) 75 MG EC tablet Take 1 tablet (75 mg) by mouth 2 times daily (with meals) 60 tablet 1    DULoxetine (CYMBALTA) 60 MG capsule       LORazepam (ATIVAN) 0.5 MG tablet Take 0.5 mg by mouth daily      Ascorbic Acid (VITAMIN C) 500 MG CAPS Take  by mouth daily.      denosumab (PROLIA) 60 MG/ML SOSY injection Inject 60 mg Subcutaneous       Current Facility-Administered Medications   Medication Dose Route Frequency Provider Last Rate Last Admin    denosumab (PROLIA) injection 60 mg  60 mg Subcutaneous Q6 Months    60 mg at 06/24/24 1326       Fam Hx:  Family History   Problem Relation Age of Onset    Colon Cancer Maternal Grandmother     Colon Cancer Maternal Grandfather     High cholesterol Mother     Hypertension Mother     Myocardial Infarction Mother 72    High cholesterol Father     Hypertension Father     Myocardial Infarction Father 76    Acute Myocardial Infarction Mother     Hyperlipidemia Mother     Alcoholism Mother     Arthritis Mother     Depression Mother     Acute Myocardial Infarction Father     Hyperlipidemia Father     Alcoholism Father     Prostate Cancer Father     Diabetes Sister     Alcoholism Sister     Heart Disease Paternal Uncle     Alcoholism Paternal Uncle     Arthritis Maternal Grandmother     Alcoholism Brother     Dementia Brother     No Known Problems Son     Alcoholism Maternal Uncle     Alcoholism Paternal Aunt        P/S Hx:  Social History     Tobacco Use    Smoking status: Never    Smokeless tobacco: Never   Substance Use Topics    Alcohol use: No     Comment: 7 per week         Physical Exam:  Very pleasant, healthy appearing, alert, oriented x 3, cooperative.  Normal mood and affect.  Not in  "cardiorespiratory distress.  Ht 1.499 m (4' 11\")   Wt 42.6 kg (94 lb)   BMI 18.99 kg/m    Normal upright posture.    Normal gait without assistive device.  No antalgia / imbalance.    No gross spinal deformity, no skin lesions or surgical scars.  Localizes pain at neck  Tenderness: (-) midline, (-) paraspinal, (-) R and L PSIS.  ROM:   Guarded    Neuro Exam:  Motor: 5/5 strength in shoulder abduction, elbow flexion extension, wrist flexion extension, hand intrinsics.  Sensory: Sensation intact to light touch in all major nerve distributions.    Negative Servando sign bilaterally.  She has 3+ patellar and Achilles reflex on the left.  Diminished patellar and Achilles reflex on the right.  No clonus.    She has some pain with resisted shoulder abduction, and external rotation    Lower Extremity:  Equal leg lengths, full pulses, (-) atrophy / asymmetry.  Full painless passive knee and ankle motion.  Straight leg raise: (-) right, (-) left.  Hip impingement: (-) right, (-) left.  JEANNE/Mello's: (-) right, (-) left.      Imaging:    X-ray right shoulder: My personal interpretation, there is some joint space narrowing. She has some tendinopathy and calcification at the insertion site of the supraspinatus.    X-ray cervical spine ap-lat and flexion-extension 6/13/24: My personal interpretation, there is significant degenerative changes throughout the lumbar spine most significantly at C4-5 with anterior spondylolisthesis of C4 on C5 that reduces slightly on flexion and extension x-rays.  There is significant degenerative changes at C5-6 and 7 with both anterior and posterior osteophyte and near complete disc space collapse.  I question whether or not these discs are    EOS full body AP lateral standing x-rays taken today reviewed.  Shows previous TLIF with posterior segmental instrumentation L4 to pelvis.  No sign of fixation loosening or failure.  No evidence of proximal junctional failure.  Shows advanced multilevel " cervical spondylosis.  Spondylolisthesis C4-5.  No significant cervical sagittal malalignment.  Cervical sagittal measurements:  T1 slope 20  C2 slope 1  C2-T1 lord 19  C2-C7 SVA +1.5cm    Assessment:    Cervical spine:  1.  Spondylolisthesis with instability C4-5.  2.  Advanced cervical spondylosis C5-C7.  3.  Right shoulder pain 2' to either R C5 radiculopathy versus R shoulder pathology (glenohumeral arthritis or rotator cuff disease).  Lumbar spine:  1.  5 yrs s/p TLIF-SPO L4-S1 (2 levels) with bilateral pelvic fixation (4/3/19 Lorenzo), doing well, with probable solid arthrodesis.    Plan:    We had a long discussion with the patient regarding her symptoms and x-ray findings.  At this time we are uncertain whether or not the patient has signs and symptoms consistent with a cervical radiculopathy versus right shoulder pathology.  In addition the patient does not demonstrate any overt signs of cervical myelopathy.  However given the degree of degenerative changes in her cervical spine we feel that it is prudent to obtain a cervical MRI.  We would also obtain a right C5 nerve root block for diagnostic and possible therapeutic purposes.  We will plan to follow-up with the patient with a virtual visit once the MRI and injections are performed to monitor her response to these therapeutic interventions.    - MRI cervical spine  - Right C5 nerve root block    Virtual RTC after above to review MRI and injection response, and discuss further options.      Attestation:  I (Dr. Luis Ardon - Spine Surgeon) have personally evaluated patient with Spine Fellow Dr. Carson Hedrick, and agree with findings and plan outlined in the note, which I also edited.  I discussed at length with the patient/family, explained the nature of spinal condition, and formulated workup and/or treatment plan together.  All questions were answered to the best of my ability and to patient's apparent satisfaction.     45 minutes spent on the date of  the encounter doing chart review/review of outside records/review of test results/interpretation of tests/patient visit/documentation/discussion with other provider(s)/discussion with patient and family.      Luis Ardon MD    Orthopaedic Spine Surgery  Dept Orthopaedic Surgery, [facility]  [phone] office, [phone] pager  [email or web address]

## 2024-06-27 NOTE — PROGRESS NOTES
Spine Surgical Hx:  04/03/2019 - PISF L4-pevlis; TLIF-SPO L4-S1 (2 levels); Right posterior ICBG harvest; allograft (Lorenzo) for stenosis, scoliosis, spondylolisthesis with radiculopathy and neurogenic claudication.  [Implants: Medtronic Solera and Ballast screw system; Capstone Control cages].      In-Person Visit    REASON FOR CONSULTATION: Consult (NEW CERVICAL SPINE)     REFERRING PHYSICIAN: Omega Klein  PCP:Jessie Jefferson      History of Present Illness:  74 year old female, referred by Dr. Omega Klein (PCP?) for cervical spine instability.  Had previously been seen by my partner Dr. Lane Ventura; last visit 12/31/19 (> 3 yrs ago; thus, this is still considered a new patient visit).  At that time, Dr. Ventura saw her mainly for R thigh and groin symptoms; and thus not for her neck.    Leeanne presents today at the request of her primary care doctor after obtaining cervical spine x-rays.  Today she reports a constant dull ache in her neck.  However most of her symptoms seem to be coming from the right shoulder.  She reports difficulty with overhead activities and using her right shoulder.  She reports pain at night.  She denies any changes in her balance or hand function.  She reports that her hands are arthritic and she has pain and difficulty secondary to the degenerative changes.  She denies any other radiating arm pain.  She denies any pain in her back or legs.  She denies any recent falls.  No additional complaints or concerns reported at this time.    Neck 60%, Arm 40%,  Right Only  Worse: Her right shoulder pain is worse with activity especially overhead activities  Better: None    Previous treatment:   She has been participating in dedicated physical therapy for her right shoulder which seems to have provided significant relief.    Previous Injections:  None    Oswestry (BEENA) Questionnaire        12/31/2019    12:53 PM   OSWESTRY DISABILITY INDEX   Count 7   Sum 13   Oswestry  Score (%) 37.14 %      BEENA 12/31/19 37.14%      Neck Disability Index (NDI) Questionnaire        6/22/2024     8:22 PM   Neck Disability Index (NDI)   Neck Disability Index: Count 10   NDI: Total Score = SUM (points for all 10 findings) 14   Neck Disability in Percent = (Total Score) / 50 * 100 28 (%)      NDI 6/22/24 28%      Visual Analog Pain Scale  Back Pain Scale 0-10: 2 (low back pain)  Right leg pain: 0  Left leg pain: 0  Neck Pain Scale 0-10: 2  Right arm pain: 0  Left arm pain: 0    PROMIS-10 Scores  Global Mental Health Score: (P) 10  Global Physical Health Score: (P) 13  PROMIS TOTAL - SUBSCORES: (P) 23    ROS:  A 12-point review of systems was completed and is negative except for otherwise noted above in the history of present illness.    Med Hx:  Past Medical History:   Diagnosis Date    Anxiety     Depression     Hyponatremia 05/16/2017    Osteoporosis     Vitamin D deficiency     Wedge compression fracture of unspecified thoracic vertebra, sequela 06/30/2016       Surg Hx:  Past Surgical History:   Procedure Laterality Date    CATARACT EXTRACTION, BILATERAL  2014    CATARACT IOL, RT/LT  2014    HARVEST BONE MARROW FROM ILIAC CREST Right 4/3/2019    Procedure: [place] Bone Marrow From Iliac Crest;  Surgeon: Lane Ventura MD;  Location: [facility] OR    KIDNEY STONE SURGERY      OPTICAL TRACKING SYSTEM FUSION SPINE POSTERIOR LUMBAR THREE+ LEVELS N/A 4/3/2019    Procedure: Lumbar 3-4 Decompression, Lumbar 4-Sacral 1 Transforaminal Lumbar Interbody Fusion, Lumbar 5-Sacral 1 Kaminski Maxwell Osteotomy, Lumbar 4-Pelvis Instrumentation, Illiac Crest Autograft;  Surgeon: Lane Ventura MD;  Location: [facility] OR    OTHER SURGICAL HISTORY      HAMSTRING SURGERY       Allergies:  Allergies   Allergen Reactions    Chocolate     Orange     Gabapentin      Confusion         Meds:  Current Outpatient Medications   Medication Sig Dispense Refill    calcium citrate (CITRACAL) 950 (200 Ca) MG tablet        "diclofenac (VOLTAREN) 75 MG EC tablet Take 1 tablet (75 mg) by mouth 2 times daily (with meals) 60 tablet 1    DULoxetine (CYMBALTA) 60 MG capsule       LORazepam (ATIVAN) 0.5 MG tablet Take 0.5 mg by mouth daily      Ascorbic Acid (VITAMIN C) 500 MG CAPS Take  by mouth daily.      denosumab (PROLIA) 60 MG/ML SOSY injection Inject 60 mg Subcutaneous       Current Facility-Administered Medications   Medication Dose Route Frequency Provider Last Rate Last Admin    denosumab (PROLIA) injection 60 mg  60 mg Subcutaneous Q6 Months    60 mg at 06/24/24 1326       Fam Hx:  Family History   Problem Relation Age of Onset    Colon Cancer Maternal Grandmother     Colon Cancer Maternal Grandfather     High cholesterol Mother     Hypertension Mother     Myocardial Infarction Mother 72    High cholesterol Father     Hypertension Father     Myocardial Infarction Father 76    Acute Myocardial Infarction Mother     Hyperlipidemia Mother     Alcoholism Mother     Arthritis Mother     Depression Mother     Acute Myocardial Infarction Father     Hyperlipidemia Father     Alcoholism Father     Prostate Cancer Father     Diabetes Sister     Alcoholism Sister     Heart Disease Paternal Uncle     Alcoholism Paternal Uncle     Arthritis Maternal Grandmother     Alcoholism Brother     Dementia Brother     No Known Problems Son     Alcoholism Maternal Uncle     Alcoholism Paternal Aunt        P/S Hx:  Social History     Tobacco Use    Smoking status: Never    Smokeless tobacco: Never   Substance Use Topics    Alcohol use: No     Comment: 7 per week         Physical Exam:  Very pleasant, healthy appearing, alert, oriented x 3, cooperative.  Normal mood and affect.  Not in cardiorespiratory distress.  Ht 1.499 m (4' 11\")   Wt 42.6 kg (94 lb)   BMI 18.99 kg/m    Normal upright posture.    Normal gait without assistive device.  No antalgia / imbalance.    No gross spinal deformity, no skin lesions or surgical scars.  Localizes pain at " neck  Tenderness: (-) midline, (-) paraspinal, (-) R and L PSIS.  ROM:   Guarded    Neuro Exam:  Motor: 5/5 strength in shoulder abduction, elbow flexion extension, wrist flexion extension, hand intrinsics.  Sensory: Sensation intact to light touch in all major nerve distributions.    Negative Servando sign bilaterally.  She has 3+ patellar and Achilles reflex on the left.  Diminished patellar and Achilles reflex on the right.  No clonus.    She has some pain with resisted shoulder abduction, and external rotation    Lower Extremity:  Equal leg lengths, full pulses, (-) atrophy / asymmetry.  Full painless passive knee and ankle motion.  Straight leg raise: (-) right, (-) left.  Hip impingement: (-) right, (-) left.  JEANNE/Mello's: (-) right, (-) left.      Imaging:    X-ray right shoulder: My personal interpretation, there is some joint space narrowing. She has some tendinopathy and calcification at the insertion site of the supraspinatus.    X-ray cervical spine ap-lat and flexion-extension 6/13/24: My personal interpretation, there is significant degenerative changes throughout the lumbar spine most significantly at C4-5 with anterior spondylolisthesis of C4 on C5 that reduces slightly on flexion and extension x-rays.  There is significant degenerative changes at C5-6 and 7 with both anterior and posterior osteophyte and near complete disc space collapse.  I question whether or not these discs are    EOS full body AP lateral standing x-rays taken today reviewed.  Shows previous TLIF with posterior segmental instrumentation L4 to pelvis.  No sign of fixation loosening or failure.  No evidence of proximal junctional failure.  Shows advanced multilevel cervical spondylosis.  Spondylolisthesis C4-5.  No significant cervical sagittal malalignment.  Cervical sagittal measurements:  T1 slope 20  C2 slope 1  C2-T1 lord 19  C2-C7 SVA +1.5cm    Assessment:    Cervical spine:  1.  Spondylolisthesis with instability  C4-5.  2.  Advanced cervical spondylosis C5-C7.  3.  Right shoulder pain 2' to either R C5 radiculopathy versus R shoulder pathology (glenohumeral arthritis or rotator cuff disease).  Lumbar spine:  1.  5 yrs s/p TLIF-SPO L4-S1 (2 levels) with bilateral pelvic fixation (4/3/19 Lorenzo), doing well, with probable solid arthrodesis.    Plan:    We had a long discussion with the patient regarding her symptoms and x-ray findings.  At this time we are uncertain whether or not the patient has signs and symptoms consistent with a cervical radiculopathy versus right shoulder pathology.  In addition the patient does not demonstrate any overt signs of cervical myelopathy.  However given the degree of degenerative changes in her cervical spine we feel that it is prudent to obtain a cervical MRI.  We would also obtain a right C5 nerve root block for diagnostic and possible therapeutic purposes.  We will plan to follow-up with the patient with a virtual visit once the MRI and injections are performed to monitor her response to these therapeutic interventions.    - MRI cervical spine  - Right C5 nerve root block    Virtual RTC after above to review MRI and injection response, and discuss further options.      Attestation:  I (Dr. Luis Ardon - Spine Surgeon) have personally evaluated patient with Spine Fellow Dr. Carson Hedrick, and agree with findings and plan outlined in the note, which I also edited.  I discussed at length with the patient/family, explained the nature of spinal condition, and formulated workup and/or treatment plan together.  All questions were answered to the best of my ability and to patient's apparent satisfaction.     45 minutes spent on the date of the encounter doing chart review/review of outside records/review of test results/interpretation of tests/patient visit/documentation/discussion with other provider(s)/discussion with patient and family.      Luis Ardon MD  Associate  Professor  Orthopaedic Spine Surgery  Dept Orthopaedic Surgery, [facility]  [phone] office, [phone] pager  [email or web address]

## 2024-07-03 ENCOUNTER — ANCILLARY PROCEDURE (OUTPATIENT)
Dept: CT IMAGING | Facility: CLINIC | Age: 74
End: 2024-07-03
Attending: FAMILY MEDICINE
Payer: COMMERCIAL

## 2024-07-03 ENCOUNTER — ANCILLARY PROCEDURE (OUTPATIENT)
Dept: MRI IMAGING | Facility: CLINIC | Age: 74
End: 2024-07-03
Attending: FAMILY MEDICINE
Payer: COMMERCIAL

## 2024-07-03 DIAGNOSIS — M43.12 SPONDYLOLISTHESIS OF CERVICAL REGION: ICD-10-CM

## 2024-07-03 DIAGNOSIS — M54.2 NECK PAIN: ICD-10-CM

## 2024-07-03 PROCEDURE — 72125 CT NECK SPINE W/O DYE: CPT | Mod: GC | Performed by: RADIOLOGY

## 2024-07-03 PROCEDURE — 72141 MRI NECK SPINE W/O DYE: CPT | Mod: GC | Performed by: RADIOLOGY

## 2024-07-11 ASSESSMENT — ANXIETY QUESTIONNAIRES
2. NOT BEING ABLE TO STOP OR CONTROL WORRYING: NEARLY EVERY DAY
7. FEELING AFRAID AS IF SOMETHING AWFUL MIGHT HAPPEN: NEARLY EVERY DAY
GAD7 TOTAL SCORE: 20
4. TROUBLE RELAXING: NEARLY EVERY DAY
1. FEELING NERVOUS, ANXIOUS, OR ON EDGE: NEARLY EVERY DAY
3. WORRYING TOO MUCH ABOUT DIFFERENT THINGS: NEARLY EVERY DAY
6. BECOMING EASILY ANNOYED OR IRRITABLE: MORE THAN HALF THE DAYS
GAD7 TOTAL SCORE: 20
GAD7 TOTAL SCORE: 20
7. FEELING AFRAID AS IF SOMETHING AWFUL MIGHT HAPPEN: NEARLY EVERY DAY
8. IF YOU CHECKED OFF ANY PROBLEMS, HOW DIFFICULT HAVE THESE MADE IT FOR YOU TO DO YOUR WORK, TAKE CARE OF THINGS AT HOME, OR GET ALONG WITH OTHER PEOPLE?: SOMEWHAT DIFFICULT
IF YOU CHECKED OFF ANY PROBLEMS ON THIS QUESTIONNAIRE, HOW DIFFICULT HAVE THESE PROBLEMS MADE IT FOR YOU TO DO YOUR WORK, TAKE CARE OF THINGS AT HOME, OR GET ALONG WITH OTHER PEOPLE: SOMEWHAT DIFFICULT
5. BEING SO RESTLESS THAT IT IS HARD TO SIT STILL: NEARLY EVERY DAY

## 2024-07-16 ENCOUNTER — VIRTUAL VISIT (OUTPATIENT)
Dept: PSYCHOLOGY | Facility: CLINIC | Age: 74
End: 2024-07-16
Payer: COMMERCIAL

## 2024-07-16 DIAGNOSIS — F32.1 CURRENT MODERATE EPISODE OF MAJOR DEPRESSIVE DISORDER, UNSPECIFIED WHETHER RECURRENT (H): ICD-10-CM

## 2024-07-16 DIAGNOSIS — F41.1 GAD (GENERALIZED ANXIETY DISORDER): ICD-10-CM

## 2024-07-16 PROCEDURE — 90837 PSYTX W PT 60 MINUTES: CPT | Mod: 95 | Performed by: COUNSELOR

## 2024-07-16 ASSESSMENT — COLUMBIA-SUICIDE SEVERITY RATING SCALE - C-SSRS
5. HAVE YOU STARTED TO WORK OUT OR WORKED OUT THE DETAILS OF HOW TO KILL YOURSELF? DO YOU INTEND TO CARRY OUT THIS PLAN?: NO
LETHALITY/MEDICAL DAMAGE CODE MOST RECENT ACTUAL ATTEMPT: MODERATELY SEVERE PHYSICAL DAMAGE, MEDICAL HOSPITALIZATION AND LIKELY INTENSIVE CARE REQUIRED
ATTEMPT PAST THREE MONTHS: NO
LETHALITY/MEDICAL DAMAGE CODE MOST RECENT POTENTIAL ATTEMPT: BEHAVIOR LIKELY TO RESULT IN INJURY BUT NOT LIKELY TO CAUSE DEATH
3. HAVE YOU BEEN THINKING ABOUT HOW YOU MIGHT KILL YOURSELF?: YES
TOTAL  NUMBER OF ACTUAL ATTEMPTS LIFETIME: 1
6. HAVE YOU EVER DONE ANYTHING, STARTED TO DO ANYTHING, OR PREPARED TO DO ANYTHING TO END YOUR LIFE?: NO
1. IN THE PAST MONTH, HAVE YOU WISHED YOU WERE DEAD OR WISHED YOU COULD GO TO SLEEP AND NOT WAKE UP?: NO
2. HAVE YOU ACTUALLY HAD ANY THOUGHTS OF KILLING YOURSELF?: YES
1. HAVE YOU WISHED YOU WERE DEAD OR WISHED YOU COULD GO TO SLEEP AND NOT WAKE UP?: YES
ATTEMPT LIFETIME: YES
REASONS FOR IDEATION LIFETIME: MOSTLY TO END OR STOP THE PAIN (YOU COULDN'T GO ON LIVING WITH THE PAIN OR HOW YOU WERE FEELING)
TOTAL  NUMBER OF ABORTED OR SELF INTERRUPTED ATTEMPTS LIFETIME: NO
2. HAVE YOU ACTUALLY HAD ANY THOUGHTS OF KILLING YOURSELF?: NO
TOTAL  NUMBER OF INTERRUPTED ATTEMPTS LIFETIME: NO
LETHALITY/MEDICAL DAMAGE CODE MOST LETHAL POTENTIAL ATTEMPT: BEHAVIOR LIKELY TO RESULT IN INJURY BUT NOT LIKELY TO CAUSE DEATH
4. HAVE YOU HAD THESE THOUGHTS AND HAD SOME INTENTION OF ACTING ON THEM?: NO
LETHALITY/MEDICAL DAMAGE CODE MOST LETHAL ACTUAL ATTEMPT: MODERATELY SEVERE PHYSICAL DAMAGE, MEDICAL HOSPITALIZATION AND LIKELY INTENSIVE CARE REQUIRED

## 2024-07-16 NOTE — PROGRESS NOTES
[facility]   Mental Health & Addiction Services     Progress Note - Initial Visit    Patient  Name:  Leeanne Duarte Date: 2024         Service Type: Individual     Visit Start Time: 2:00pm  Visit End Time: 3:00pm    Visit #: 1    Attendees: Client attended alone    Service Modality:  Video Visit:      Provider verified identity through the following two step process.  Patient provided:  Patient  and Patient address    Telemedicine Visit: The patient's condition can be safely assessed and treated via synchronous audio and visual telemedicine encounter.      Reason for Telemedicine Visit: Patient has requested telehealth visit    Originating Site (Patient Location): Patient's home    Distant Site (Provider Location): Provider Remote Setting- Home Office    Consent:  The patient/guardian has verbally consented to: the potential risks and benefits of telemedicine (video visit) versus in person care; bill my insurance or make self-payment for services provided; and responsibility for payment of non-covered services.     Patient would like the video invitation sent by:  My Chart    Mode of Communication:  Video Conference via Amwell    Distant Location (Provider):  Off-site    As the provider I attest to compliance with applicable laws and regulations related to telemedicine.       DATA:   Interactive Complexity: No   Crisis: No     Presenting Concerns/  Current Stressors:   Depression and anxiety      ASSESSMENT:  Mental Status Assessment:  Appearance:   Appropriate   Eye Contact:   Poor  Psychomotor Behavior: Normal   Attitude:   Cooperative   Orientation:   All  Speech   Rate / Production: Normal/ Responsive   Volume:  Normal   Mood:    Depressed  Sad   Affect:    Tearful  Thought Content:  Clear   Thought Form:  Coherent   Insight:    Fair     Assessments completed prior to this visit:  The following assessments were completed by patient for this visit:  PHQ9:       2019    11:30  AM 3/11/2022    10:50 AM 11/9/2022    12:40 PM 12/21/2023    12:31 PM 1/15/2024     2:52 PM 4/12/2024     3:09 PM 4/30/2024     3:39 PM   PHQ-9 SCORE   PHQ-9 Total Score MyChart 1 (Minimal depression)  4 (Minimal depression) 12 (Moderate depression) 18 (Moderately severe depression)     PHQ-9 Total Score 1 6 4 12 18 12 12     GAD7:       7/11/2024     9:13 AM   ALEKSANDR-7 SCORE   Total Score 20 (severe anxiety)   Total Score 20         Safety Issues and Plan for Safety and Risk Management:   [place] Suicide Severity Rating Scale (Lifetime/Recent)      4/3/2019     6:30 AM 4/9/2024     2:23 PM 4/9/2024     4:59 PM 7/16/2024     2:00 PM   [place] Suicide Severity Rating (Lifetime/Recent)   Q1 Wished to be Dead (Past Month) no 0-->no 0-->no    Q2 Suicidal Thoughts (Past Month) no 0-->no 0-->no    Q6 Suicide Behavior (Lifetime) no 0-->no 0-->no    Level of Risk per Screen  no risks indicated no risks indicated    Q1 Wish to be Dead (Lifetime)    Y   1. Wish to be Dead (Past 1 Month)    N   Q2 Non-Specific Active Suicidal Thoughts (Lifetime)    Y   2. Non-Specific Active Suicidal Thoughts (Past 1 Month)    N   3. Active Suicidal Ideation with any Methods (Not Plan) Without Intent to Act (Lifetime)    Y   Q3 Active Suicidal Ideation with any Methods (Not Plan) Without Intent to Act (Past 1 Month)    N   Q4 Active Suicidal Ideation with Some Intent to Act, Without Specific Plan (Lifetime)    N   Q5 Active Suicidal Ideation with Specific Plan and Intent (Lifetime)    N   Most Severe Ideation Rating (Lifetime)    5   Description of Most Severe Ideation (Lifetime)    pt attempted suicide by overdose when 22 years old   Frequency (Lifetime)    4   Duration (Lifetime)    3   Controllability (Lifetime)    3   Deterrents (Lifetime)    2   Reasons for Ideation (Lifetime)    4   Actual Attempt (Lifetime)    Y   Total Number of Actual Attempts (Lifetime)    1   Actual Attempt Description (Lifetime)    When pt was 22 she attempted by  overdosing on pills   Actual Attempt (Past 3 Months)    N   Has subject engaged in non-suicidal self-injurious behavior? (Lifetime)    Y   Interrupted Attempts (Lifetime)    N   Aborted or Self-Interrupted Attempt (Lifetime)    N   Preparatory Acts or Behavior (Lifetime)    N   Actual Lethality/Medical Damage Code (Most Recent Attempt)    3   Potential Lethality Code (Most Recent Attempt)    1   Actual Lethality/Medical Damage Code (Most Lethal Attempt)    3   Potential Lethality Code (Most Lethal Attempt)    1   Calculated C-SSRS Risk Score (Lifetime/Recent)    Moderate Risk     Patient denies current fears or concerns for personal safety.  Patient denies current or recent suicidal ideation or behaviors.  Patient denies current or recent homicidal ideation or behaviors.  Patient denies current or recent self injurious behavior or ideation.  Patient denies other safety concerns.  A safety and risk management plan has been developed including: Patient consented to co-developed safety plan.  Safety and risk management plan was completed - see below.  Patient agreed to use safety plan should any safety concerns arise.  A copy was given to the patient.  Patient reports there are no firearms in the house.     Diagnostic Criteria:  Generalized Anxiety Disorder  A. Excessive anxiety and worry about a number of events or activities (such as work or school performance).   B. The person finds it difficult to control the worry.  C. Select 3 or more symptoms (required for diagnosis). Only one item is required in children.   - Restlessness or feeling keyed up or on edge.    - Being easily fatigued.    - Difficulty concentrating or mind going blank.    - Sleep disturbance (difficulty falling or staying asleep, or restless unsatisfying sleep).   D. The focus of the anxiety and worry is not confined to features of an Axis I disorder.  E. The anxiety, worry, or physical symptoms cause clinically significant distress or impairment in  social, occupational, or other important areas of functioning.   F. The disturbance is not due to the direct physiological effects of a substance (e.g., a drug of abuse, a medication) or a general medical condition (e.g., hyperthyroidism) and does not occur exclusively during a Mood Disorder, a Psychotic Disorder, or a Pervasive Developmental Disorder.    - The aformentioned symptoms began over 10 year(s) ago and occurs 7 days per week and is experienced as severe.  Major Depressive Disorder  CRITERIA (A-C) REPRESENT A MAJOR DEPRESSIVE EPISODE - SELECT THESE CRITERIA  A) Recurrent episode(s) - symptoms have been present during the same 2-week period and represent a change from previous functioning 5 or more symptoms (required for diagnosis)   - Depressed mood. Note: In children and adolescents, can be irritable mood.     - Diminished interest or pleasure in all, or almost all, activities.    - Decreased sleep.    - Fatigue or loss of energy.    - Feelings of worthlessness or inappropriate and excessive guilt.   B) The symptoms cause clinically significant distress or impairment in social, occupational, or other important areas of functioning  C) The episode is not attributable to the physiological effects of a substance or to another medical condition  D) The occurence of major depressive episode is not better explained by other thought / psychotic disorders  E) There has never been a manic episode or hypomanic episode      DSM5 Diagnoses: (Sustained by DSM5 Criteria Listed Above)  Diagnoses: 296.32 (F33.1) Major Depressive Disorder, Recurrent Episode, Moderate _  300.02 (F41.1) Generalized Anxiety Disorder  R/O PTSD  Psychosocial & Contextual Factors: Pt is a 74 year old female, trauma hx  Intervention:   Completed Safety plan; DA in progress  Collateral Reports Completed:  Routed note to Care Team Member(s)      PLAN: (Homework, other):  1. Provider will continue Diagnostic Assessment.  Patient was given the  following to do until next session:  pt will call the scheduling line to schedule a follow up session    2. Provider recommended the following referrals: n/a.      3.  Suicide Risk and Safety Concerns were assessed for Leeanne Duarte.    Patient meets the following risk assessment and triage: See C-SSRS assessment. Pt reports a hx of passive SI with an attempt when she was 22 years old. She reports no SI over the last month. Safety plan was created and a copy was sent to the patient.      DEISY Begum, [facility]  July 16, 2024      [place] Safety Plan      Creation Date: 7/16/24       Step 1: Warning signs:    Warning Signs    If I have difficulty sleeping for several days in a row    When I can't get up and go for a walk    Going days without getting dressed and not getting out of bed    If I lash out at my       Step 2: Internal coping strategies - Things I can do to take my mind off my problems without contacting another person:    Strategies    I listen to audio books    Move my body while listening to podcasts    Watch documentaries      Step 3: People and social settings that provide distraction:       Places    [employer] workout classes    [employer]      Step 4: People whom I can ask for help during a crisis:    Name Contact Information    Psychiatrist Number in my phone     ([place]le) Number in my phone      Step 5: Professionals or agencies I can contact during a crisis:    Local Emergency Department Emergency Department Address Emergency Department Phone    [facility] [address] [place], [place] [address] [phone]      Suicide Prevention Lifeline Phone: Call or Text 250  Crisis Text Line: Text HOME to 597158     Step 6: Making the environment safer (plan for lethal means safety):   Did not identify any lethal methods     Optional: What is most important to me and worth living for?:   My children      [place] Safety Plan. Emmie Lane and Terrance Ortiz. Used with  permission of the authors.

## 2024-08-12 PROBLEM — M19.011 OSTEOARTHRITIS OF GLENOHUMERAL JOINT, RIGHT: Status: RESOLVED | Noted: 2024-06-14 | Resolved: 2024-08-12

## 2024-10-21 ENCOUNTER — TELEPHONE (OUTPATIENT)
Dept: FAMILY MEDICINE | Facility: CLINIC | Age: 74
End: 2024-10-21
Payer: COMMERCIAL

## 2024-10-21 NOTE — TELEPHONE ENCOUNTER
Order/Referral Request    Who is requesting: Pt    Orders being requested: ENT    Reason service is needed/diagnosis: Saw Dentist on 10/21/2024 and they recommended seeing ENT    When are orders needed by: ASAP    Has this been discussed with Provider: No    Does patient have a preference on a Group/Provider/Facility? [place]    Does patient have an appointment scheduled?: No    Where to send orders: Place orders within Epic    Could we send this information to you in [employer] or would you prefer to receive a phone call?:   Patient would like to be contacted via [employer]

## 2024-10-23 ENCOUNTER — TELEPHONE (OUTPATIENT)
Dept: ORTHOPEDICS | Facility: CLINIC | Age: 74
End: 2024-10-23
Payer: COMMERCIAL

## 2024-10-23 NOTE — TELEPHONE ENCOUNTER
Patient competed MRI & CT scan but canceled pain clinic appt for injection & canceled RTN Video appt with .    I called pt who has decided not to have injection or consider surgery at this time & stated is not seeing another provider for this.  I offered a video appt to discuss MRI & CT results stating then pt could be informed about Diagnosis but that does not mean pt has to have surgery & pt stated understanding  but declined appt at this time.  I wished pt the best.  Call back prn [phone] if needs another appt.  Pt agreed.  Alberta Godoy RN .

## 2024-10-24 NOTE — TELEPHONE ENCOUNTER
Writer replied to patient via MyChart per patient request. Advised referral e-visit.    WOODROW BenjaminN, RN (she/her)  [facility] RN

## 2024-12-18 ENCOUNTER — PATIENT OUTREACH (OUTPATIENT)
Dept: CARE COORDINATION | Facility: CLINIC | Age: 74
End: 2024-12-18
Payer: COMMERCIAL

## 2024-12-19 ENCOUNTER — TELEPHONE (OUTPATIENT)
Dept: FAMILY MEDICINE | Facility: CLINIC | Age: 74
End: 2024-12-19

## 2024-12-19 DIAGNOSIS — M81.0 AGE-RELATED OSTEOPOROSIS WITHOUT CURRENT PATHOLOGICAL FRACTURE: Primary | ICD-10-CM

## 2024-12-19 NOTE — TELEPHONE ENCOUNTER
Called to patient, relayed provider's message in detail.     I gave her radiology number [phone].     No further questions at this time.    Foster DASH RN

## 2024-12-19 NOTE — TELEPHONE ENCOUNTER
Order/Referral Request    Who is requesting: hannah    Orders being requested: dexa orders - current orders   -pt needing to reschedule apt due to weather.   Pt see -     Rudy Cisneros MD       Reason service is needed/diagnosis: dexa    When are orders needed by: asap    Has this been discussed with Provider: No    Does patient have a preference on a Group/Provider/Facility? [facility]    Does patient have an appointment scheduled?: No    Where to send orders: Place orders within Epic    Could we send this information to you in [facility] or would you prefer to receive a phone call?:   Patient would prefer a phone call   Okay to leave a detailed message?: Yes at Cell number on file:    Telephone Information:   Mobile [phone]

## 2024-12-28 ENCOUNTER — HEALTH MAINTENANCE LETTER (OUTPATIENT)
Age: 74
End: 2024-12-28

## 2024-12-31 ENCOUNTER — TELEPHONE (OUTPATIENT)
Dept: FAMILY MEDICINE | Facility: CLINIC | Age: 74
End: 2024-12-31
Payer: COMMERCIAL

## 2024-12-31 NOTE — TELEPHONE ENCOUNTER
----- Message from Rudy Cisneros sent at 12/30/2024  7:53 PM CST -----  Call the pt, discuss toxic level of vit D and review her vit D intake. She should stop taking vit D supplements for one month. After one month, can start taking 1000 international unit(s) vit D3 OTC daily

## 2024-12-31 NOTE — TELEPHONE ENCOUNTER
Patient calling back relayed provider message, patient stating that she isn't taking any Vit D supplement, she is only taking a Calcium Citrate. This is news to her and she has had this occur before, she will review her supplement bottle, please call with any further questions.

## 2025-01-02 NOTE — TELEPHONE ENCOUNTER
"Patient called back to the clinic, on 1/2/25, and writer relayed the provider's recommendation regarding calcium supplements on 12/31/24:    \"She can try to find the calcium supplements without vit D.\"    Patient verbalized understanding and agrees with the plan.    Denies other questions or concerns at this time.    Leandra Maxwell RN, BSN  [facility]      "

## 2025-02-06 ENCOUNTER — TELEPHONE (OUTPATIENT)
Dept: ORTHOPEDICS | Facility: CLINIC | Age: 75
End: 2025-02-06
Payer: COMMERCIAL

## 2025-02-06 NOTE — TELEPHONE ENCOUNTER
Upon review, patient is desiring to be seen for hip and back pain. Outbound call to discuss with patient. Patient states that her left anterior hip has pain, and she also has right sided back pain and numbness. It was discussed that Dr. Juarez can certainly see and evaluate her left hip, but the back pain will need to be addressed by our spine providers. Patient stated understanding, and was agreeable to being seen for the left hip and referred for her right sided back pain.    Shelia Newberry, BRODERICK, LAT, ATC

## 2025-02-07 ENCOUNTER — ANCILLARY PROCEDURE (OUTPATIENT)
Dept: GENERAL RADIOLOGY | Facility: CLINIC | Age: 75
End: 2025-02-07
Attending: STUDENT IN AN ORGANIZED HEALTH CARE EDUCATION/TRAINING PROGRAM
Payer: COMMERCIAL

## 2025-02-07 DIAGNOSIS — G89.29 CHRONIC LEFT HIP PAIN: ICD-10-CM

## 2025-02-07 DIAGNOSIS — M25.552 CHRONIC LEFT HIP PAIN: ICD-10-CM

## 2025-02-07 PROCEDURE — 73502 X-RAY EXAM HIP UNI 2-3 VIEWS: CPT | Mod: TC | Performed by: RADIOLOGY

## 2025-02-07 NOTE — PROGRESS NOTES
[facility]   ORTHOPEDIC SURGERY CLINIC  [place]  PATIENT:  Leeanne Duarte  YOB: 1950  DATE OF SERVICE:  02/13/25    CHIEF COMPLAINT:  Left Hip Pain    PROCEDURES:  04/03/2019 - Lane Ventura MD   1. L3 through L4 laminectomy and partial medial facetectomies and foraminotomies.  2. L5-S1 Kaminski Maxwell Osteotomy for regional deformity correction.  3. Transforaminal lumbar interbody fusion at L4-5 and L5-S1, anterior and posterior fusion through a single approach.  4. L4-5 and L5-S1 Capstone PEEK Cage.  5. L4 through S1 Posterior Spinal Fusion.  6. L4 through Pelvis Posterior Spinal Instrumentation, Medtronic Solera.  7. [place] and use of Iliac Crest Autograft, which was harvested through a separate fascial incision.  8. Use of O-Arm navigational guidance.  9. Use of Allograft    HISTORY OF PRESENT ILLNESS:  Leeanne Duarte is a 74 year old who is being seen for left hip pain.  She has been in pain for over 2 years, but the pain has gotten much worse over the last month.  Patient denies any injury to the hip.  She has tried rest and ibuprofen for treatments.  She was seen by Dr. Juarez, who offered physical therapy and corticosteroid injection, but patient would like to have a surgical consult and bypass conservative treatment.  Her pain is located in the groin area.  She has trouble with hip flexion.  She has trouble with getting in and out of a car, squatting, yoga and walking.  The pain is beginning to impact her daily activities.  She denies any numbness or tingling to the left lower extremity, but does have some in her right side.  She states she can feel and hear some grinding in the left hip with external and internal rotation of the hip.  She feels she has lost some strength in the left side, which is affecting her balance.  She feels her pain and dysfunction affecting her quality of life to an extent that she is no longer willing  to tolerate.  She is not interested in pursuing any additional nonsurgical treatments.  She denies any history of injury, infections or surgeries to the left hip joint.  She does have a history however of a Pseudomonas infection of the left proximal hamstring area status post hamstring repair by Dr. Hieu Hoskins on 9/10/2008 close complicated by a postoperative fall resulting in failure of the repair and subsequently returned to the operating room for left sciatic nerve neurolysis with revision repair, partial tear, left proximal hamstring by Dr. Hoskins on 10/2/2008.  She reports no further issues with regards to infection of the left hamstring area and reports no longer being on any type of isolation for history of Pseudomonas infection.    PREVIOUS MEDICAL HISTORY:  Past Medical History:   Diagnosis Date    Anxiety     Depression     Hyponatremia 05/16/2017    Osteoporosis     Vitamin D deficiency     Wedge compression fracture of unspecified thoracic vertebra, sequela 06/30/2016        PREVIOUS SURGICAL HISTORY:  Past Surgical History:   Procedure Laterality Date    CATARACT EXTRACTION, BILATERAL  2014    CATARACT IOL, RT/LT  2014    HARVEST BONE MARROW FROM ILIAC CREST Right 4/3/2019    Procedure: [place] Bone Marrow From Iliac Crest;  Surgeon: Lane Ventura MD;  Location: [facility] OR    KIDNEY STONE SURGERY      OPTICAL TRACKING SYSTEM FUSION SPINE POSTERIOR LUMBAR THREE+ LEVELS N/A 4/3/2019    Procedure: Lumbar 3-4 Decompression, Lumbar 4-Sacral 1 Transforaminal Lumbar Interbody Fusion, Lumbar 5-Sacral 1 Kaminski Maxwell Osteotomy, Lumbar 4-Pelvis Instrumentation, Illiac Crest Autograft;  Surgeon: Lane Ventura MD;  Location: [facility] OR    OTHER SURGICAL HISTORY      HAMSTRING SURGERY       SOCIAL HISTORY:  Social History     Socioeconomic History    Marital status:      Spouse name: Not on file    Number of children: Not on file    Years of education: Not on file    Highest education  level: Not on file   Occupational History    Not on file   Tobacco Use    Smoking status: Never     Passive exposure: Never    Smokeless tobacco: Never   Vaping Use    Vaping status: Never Used   Substance and Sexual Activity    Alcohol use: No     Comment: 7 per week    Drug use: No    Sexual activity: Never   Other Topics Concern    Parent/sibling w/ CABG, MI or angioplasty before 65F 55M? Not Asked   Social History Narrative    How much exercise per week? 1/2 -1 hr daily    How much calcium per day? Supplement + 3 glasses milk       How much caffeine per day? 0    How much vitamin D per day? supplement    Do you/your family wear seatbelts?  Yes    Do you/your family use safety helmets? No    Do you/your family use sunscreen? Yes    Do you/your family keep firearms in the home? No    Do you/your family have a smoke detector(s)? Yes        Do you feel safe in your home? Yes    Has anyone ever touched you in an unwanted manner? No     Explain         November 13, 2014 Aida Yoder LPN             Social Drivers of Health     Financial Resource Strain: Low Risk  (12/21/2023)    Financial Resource Strain     Within the past 12 months, have you or your family members you live with been unable to get utilities (heat, electricity) when it was really needed?: No   Food Insecurity: Low Risk  (12/21/2023)    Food Insecurity     Within the past 12 months, did you worry that your food would run out before you got money to buy more?: No     Within the past 12 months, did the food you bought just not last and you didn t have money to get more?: No   Transportation Needs: Low Risk  (12/21/2023)    Transportation Needs     Within the past 12 months, has lack of transportation kept you from medical appointments, getting your medicines, non-medical meetings or appointments, work, or from getting things that you need?: No   Physical Activity: Not on file   Stress: Not on file   Social Connections: Not on file   Interpersonal Safety:  Low Risk  (12/27/2024)    Interpersonal Safety     Do you feel physically and emotionally safe where you currently live?: Yes     Within the past 12 months, have you been hit, slapped, kicked or otherwise physically hurt by someone?: No     Within the past 12 months, have you been humiliated or emotionally abused in other ways by your partner or ex-partner?: No   Housing Stability: Low Risk  (12/21/2023)    Housing Stability     Do you have housing? : Yes     Are you worried about losing your housing?: No     ALLERGIES:  Allergies   Allergen Reactions    Chocolate     Orange     Gabapentin      Confusion       MEDICATIONS:  Current Outpatient Medications   Medication Sig Dispense Refill    calcium citrate (CITRACAL) 950 (200 Ca) MG tablet       denosumab (PROLIA) 60 MG/ML SOSY injection Inject 60 mg Subcutaneous      DULoxetine (CYMBALTA) 60 MG capsule       LORazepam (ATIVAN) 0.5 MG tablet Take 0.5 mg by mouth daily       No current facility-administered medications for this visit.     PHYSICAL EXAM:  Awake alert and oriented in no apparent distress.  There is no height or weight on file to calculate BMI.   Focused exam of the left lower extremity demonstrates no deformity.  Limb lengths equal  Skin is clean, dry and intact.   No erythema, warmth, ecchymosis or swelling.  No lymphedema.  No knee effusion.  No tenderness to palpation.  Range of motion:  Hip: extension 0 , flexion 110 , internal rotation 20 , external rotation 30 .  Knee: extension 0 , flexion 140   Ankle: dorsiflexion 20 , plantarflexion 50 , subtalar motion.   Knee is stable to anterior, posterior, varus and valgus stress  Ankle stable to anterior drawer.  Negative external rotation stress test .  CMS intact distally.  Intact sensation in lateral femoral cutaneous, saphenous, sural, superficial peroneal, deep peroneal and tibial distributions.    Motor intact in quadriceps, hamstrings, abductors, tibialis anterior, extensor hallucis longus, and  gastrocsoleus.   Pedal pulses intact and capillary refill brisk.    IMAGING:  EXAM: XR HIP LEFT 2-3 VIEWS  LOCATION: [facility]  DATE: 2/7/2025  INDICATION:  Chronic left hip pain, Chronic left hip pain  COMPARISON: 9/4/2014                                                             IMPRESSION:   Advanced degenerative arthrosis of the left hip with near bone-on-bone articulation.   Mild degenerative arthrosis of the right hip.  Postoperative changes of the lumbosacral spine and pelvis with lumbosacral fixation.   No acute fracture.   Unchanged chronic deformity of the left ischial tuberosity with adjacent corticated ossicles, likely sequela of previous hamstring surgeries.  No evidence of osteomyelitis.    LABS:  None    ASSESSMENT:  End-stage osteoarthritis left hip    PLAN:  Nonoperative management options for osteoarthritis were discussed in great detail with the patient. These include Physical Therapy with neuromuscular training (i.e. stretching, strengthening, balance, agility, coordination) programs in combination with traditional exercise, weight loss, assistive devices including canes or walkers, oral and topical NSAIDs, acetaminophen, and intra-articular corticosteroid injections.  At this point the patient would like to proceed with total hip arthroplasty.  Leeanne Duarte has severe, disabling pain and loss of hip function to the extent which interferes with ability to carry out age appropriate activities of daily living.  Patient does demonstrate a function-limiting pain at short distances for at least 12 months duration. Patient  has failed appropriate course of provider-directed non-surgical management for at least 3 months. This includes continued modification and over-the-counter. Patient has advanced radiographic imaging which correlates with the individual's reported symptoms and physical exam findings. This includes x-rays which shows evidence of advanced degenerative  changes including joint space narrowing and osteophyte formation.   Patient's medical chart was reviewed and they do not have any specific contraindications to total hip arthroplasty that would have a unacceptable risk of compromising the outcome.   Leeanne Duarte is therefore a appropriate candidate for left total hip arthroplasty via direct anterior approach with possible dual mobility given the significant lumbosacral spine stiffness due to instrumentation. Risks, benefits and alternatives to total hip arthroplasty discussed in detail. Risks of bleeding which may necessitate transfusion, infection which may require secondary surgery, nerve damage which may result in temporary or permanent sensory and/or motor deficit, dislocation, instability, limb length inequality, intraoperative and postoperative fracture, and possible need for revision were discussed. Possibility of post surgical pain and stiffness possibly requiring manipulation under anesthesia discussed. The overall average satisfaction rate of approximately 95% after total hip arthoplasty was discussed. I do not feel the patient's history of Pseudomonas infection in his left proximal hamstrings is particularly relevant in the current setting given that it was 17 years ago and there is no recurrence. Patient and significant other verbalized understanding and elects to proceed with total hip arthroplasty in light of potential benefits.   Signed informed consent will be obtained on date of surgery.   Educational materials provided.     X-RAYS NEXT VISIT:  AP pelvis and crosstable lateral left hip postoperatively    Mark Rene MD, FAAOS    Orthopedic Trauma  Adult Reconstruction  Department Orthopedic Surgery  [facility]

## 2025-02-09 ASSESSMENT — HOOS JR
LYING IN BED (TURNING OVER, MAINTAINING HIP POSITION): MODERATE
BENDING TO THE FLOOR TO PICK UP OBJECT: SEVERE
RISING FROM SITTING: SEVERE
WALKING ON UNEVEN SURFACE: MODERATE
HOOS JR TOTAL INTERVAL SCORE: 46.65
SITTING: MILD
GOING UP OR DOWN STAIRS: SEVERE

## 2025-02-13 ENCOUNTER — OFFICE VISIT (OUTPATIENT)
Dept: ORTHOPEDICS | Facility: CLINIC | Age: 75
End: 2025-02-13
Payer: COMMERCIAL

## 2025-02-13 DIAGNOSIS — M16.12 PRIMARY OSTEOARTHRITIS OF LEFT HIP: Primary | ICD-10-CM

## 2025-02-13 RX ORDER — TRANEXAMIC ACID 650 MG/1
1950 TABLET ORAL ONCE
OUTPATIENT
Start: 2025-02-13 | End: 2025-02-13

## 2025-02-13 NOTE — LETTER
2/13/2025      Leeanne Duarte  [address]  [place] [address]      Dear Colleague,    Thank you for referring your patient, Leeanne Duarte, to the [facility] [place]. Please see a copy of my visit note below.    [facility] PHYSIC[facility]ANS   [facility]   ORTHOPEDIC SURGERY CLINIC  [place]  PATIENT:  Leeanne Duarte  YOB: 1950  DATE OF SERVICE:  02/13/25    CHIEF COMPLAINT:  Left Hip Pain    PROCEDURES:  04/03/2019 - Lane Ventura MD   1. L3 through L4 laminectomy and partial medial facetectomies and foraminotomies.  2. L5-S1 Kaminski Maxwell Osteotomy for regional deformity correction.  3. Transforaminal lumbar interbody fusion at L4-5 and L5-S1, anterior and posterior fusion through a single approach.  4. L4-5 and L5-S1 Capstone PEEK Cage.  5. L4 through S1 Posterior Spinal Fusion.  6. L4 through Pelvis Posterior Spinal Instrumentation, Medtronic Solera.  7. [place] and use of Iliac Crest Autograft, which was harvested through a separate fascial incision.  8. Use of O-Arm navigational guidance.  9. Use of Allograft    HISTORY OF PRESENT ILLNESS:  Leeanne Duarte is a 74 year old who is being seen for left hip pain.  She has been in pain for over 2 years, but the pain has gotten much worse over the last month.  Patient denies any injury to the hip.  She has tried rest and ibuprofen for treatments.  She was seen by Dr. Juarez, who offered physical therapy and corticosteroid injection, but patient would like to have a surgical consult and bypass conservative treatment.  Her pain is located in the groin area.  She has trouble with hip flexion.  She has trouble with getting in and out of a car, squatting, yoga and walking.  The pain is beginning to impact her daily activities.  She denies any numbness or tingling to the left lower extremity, but does have some in her right side.  She states she can feel and hear some  grinding in the left hip with external and internal rotation of the hip.  She feels she has lost some strength in the left side, which is affecting her balance.  She feels her pain and dysfunction affecting her quality of life to an extent that she is no longer willing to tolerate.  She is not interested in pursuing any additional nonsurgical treatments.  She denies any history of injury, infections or surgeries to the left hip joint.  She does have a history however of a Pseudomonas infection of the left proximal hamstring area status post hamstring repair by Dr. Hieu Hoskins on 9/10/2008 close complicated by a postoperative fall resulting in failure of the repair and subsequently returned to the operating room for left sciatic nerve neurolysis with revision repair, partial tear, left proximal hamstring by Dr. Hoskins on 10/2/2008.  She reports no further issues with regards to infection of the left hamstring area and reports no longer being on any type of isolation for history of Pseudomonas infection.    PREVIOUS MEDICAL HISTORY:  Past Medical History:   Diagnosis Date     Anxiety      Depression      Hyponatremia 05/16/2017     Osteoporosis      Vitamin D deficiency      Wedge compression fracture of unspecified thoracic vertebra, sequela 06/30/2016        PREVIOUS SURGICAL HISTORY:  Past Surgical History:   Procedure Laterality Date     CATARACT EXTRACTION, BILATERAL  2014     CATARACT IOL, RT/LT  2014     HARVEST BONE MARROW FROM ILIAC CREST Right 4/3/2019    Procedure: [place] Bone Marrow From Iliac Crest;  Surgeon: Lane Ventura MD;  Location: [facility] OR     KIDNEY STONE SURGERY       OPTICAL TRACKING SYSTEM FUSION SPINE POSTERIOR LUMBAR THREE+ LEVELS N/A 4/3/2019    Procedure: Lumbar 3-4 Decompression, Lumbar 4-Sacral 1 Transforaminal Lumbar Interbody Fusion, Lumbar 5-Sacral 1 Kaminski Maxwell Osteotomy, Lumbar 4-Pelvis Instrumentation, Illiac Crest Autograft;  Surgeon: Lane Ventura,  MD;  Location: [facility] OR     OTHER SURGICAL HISTORY      HAMSTRING SURGERY       SOCIAL HISTORY:  Social History     Socioeconomic History     Marital status:      Spouse name: Not on file     Number of children: Not on file     Years of education: Not on file     Highest education level: Not on file   Occupational History     Not on file   Tobacco Use     Smoking status: Never     Passive exposure: Never     Smokeless tobacco: Never   Vaping Use     Vaping status: Never Used   Substance and Sexual Activity     Alcohol use: No     Comment: 7 per week     Drug use: No     Sexual activity: Never   Other Topics Concern     Parent/sibling w/ CABG, MI or angioplasty before 65F 55M? Not Asked   Social History Narrative    How much exercise per week? 1/2 -1 hr daily    How much calcium per day? Supplement + 3 glasses milk       How much caffeine per day? 0    How much vitamin D per day? supplement    Do you/your family wear seatbelts?  Yes    Do you/your family use safety helmets? No    Do you/your family use sunscreen? Yes    Do you/your family keep firearms in the home? No    Do you/your family have a smoke detector(s)? Yes        Do you feel safe in your home? Yes    Has anyone ever touched you in an unwanted manner? No     Explain         November 13, 2014 Aida Yoder LPN             Social Drivers of Health     Financial Resource Strain: Low Risk  (12/21/2023)    Financial Resource Strain      Within the past 12 months, have you or your family members you live with been unable to get utilities (heat, electricity) when it was really needed?: No   Food Insecurity: Low Risk  (12/21/2023)    Food Insecurity      Within the past 12 months, did you worry that your food would run out before you got money to buy more?: No      Within the past 12 months, did the food you bought just not last and you didn t have money to get more?: No   Transportation Needs: Low Risk  (12/21/2023)    Transportation Needs      Within the  past 12 months, has lack of transportation kept you from medical appointments, getting your medicines, non-medical meetings or appointments, work, or from getting things that you need?: No   Physical Activity: Not on file   Stress: Not on file   Social Connections: Not on file   Interpersonal Safety: Low Risk  (12/27/2024)    Interpersonal Safety      Do you feel physically and emotionally safe where you currently live?: Yes      Within the past 12 months, have you been hit, slapped, kicked or otherwise physically hurt by someone?: No      Within the past 12 months, have you been humiliated or emotionally abused in other ways by your partner or ex-partner?: No   Housing Stability: Low Risk  (12/21/2023)    Housing Stability      Do you have housing? : Yes      Are you worried about losing your housing?: No     ALLERGIES:  Allergies   Allergen Reactions     Chocolate      Orange      Gabapentin      Confusion       MEDICATIONS:  Current Outpatient Medications   Medication Sig Dispense Refill     calcium citrate (CITRACAL) 950 (200 Ca) MG tablet        denosumab (PROLIA) 60 MG/ML SOSY injection Inject 60 mg Subcutaneous       DULoxetine (CYMBALTA) 60 MG capsule        LORazepam (ATIVAN) 0.5 MG tablet Take 0.5 mg by mouth daily       No current facility-administered medications for this visit.     PHYSICAL EXAM:  Awake alert and oriented in no apparent distress.  There is no height or weight on file to calculate BMI.   Focused exam of the left lower extremity demonstrates no deformity.  Limb lengths equal  Skin is clean, dry and intact.   No erythema, warmth, ecchymosis or swelling.  No lymphedema.  No knee effusion.  No tenderness to palpation.  Range of motion:  Hip: extension 0 , flexion 110 , internal rotation 20 , external rotation 30 .  Knee: extension 0 , flexion 140   Ankle: dorsiflexion 20 , plantarflexion 50 , subtalar motion.   Knee is stable to anterior, posterior, varus and valgus stress  Ankle stable to  anterior drawer.  Negative external rotation stress test .  CMS intact distally.  Intact sensation in lateral femoral cutaneous, saphenous, sural, superficial peroneal, deep peroneal and tibial distributions.    Motor intact in quadriceps, hamstrings, abductors, tibialis anterior, extensor hallucis longus, and gastrocsoleus.   Pedal pulses intact and capillary refill brisk.    IMAGING:  EXAM: XR HIP LEFT 2-3 VIEWS  LOCATION: [facility]  DATE: 2/7/2025  INDICATION:  Chronic left hip pain, Chronic left hip pain  COMPARISON: 9/4/2014                                                             IMPRESSION:   Advanced degenerative arthrosis of the left hip with near bone-on-bone articulation.   Mild degenerative arthrosis of the right hip.  Postoperative changes of the lumbosacral spine and pelvis with lumbosacral fixation.   No acute fracture.   Unchanged chronic deformity of the left ischial tuberosity with adjacent corticated ossicles, likely sequela of previous hamstring surgeries.  No evidence of osteomyelitis.    LABS:  None    ASSESSMENT:  End-stage osteoarthritis left hip    PLAN:  Nonoperative management options for osteoarthritis were discussed in great detail with the patient. These include Physical Therapy with neuromuscular training (i.e. stretching, strengthening, balance, agility, coordination) programs in combination with traditional exercise, weight loss, assistive devices including canes or walkers, oral and topical NSAIDs, acetaminophen, and intra-articular corticosteroid injections.  At this point the patient would like to proceed with total hip arthroplasty.  Leeanne Duarte has severe, disabling pain and loss of hip function to the extent which interferes with ability to carry out age appropriate activities of daily living.  Patient does demonstrate a function-limiting pain at short distances for at least 12 months duration. Patient  has failed appropriate course of  provider-directed non-surgical management for at least 3 months. This includes continued modification and over-the-counter. Patient has advanced radiographic imaging which correlates with the individual's reported symptoms and physical exam findings. This includes x-rays which shows evidence of advanced degenerative changes including joint space narrowing and osteophyte formation.   Patient's medical chart was reviewed and they do not have any specific contraindications to total hip arthroplasty that would have a unacceptable risk of compromising the outcome.   Leeanne Duarte is therefore a appropriate candidate for left total hip arthroplasty via direct anterior approach with possible dual mobility given the significant lumbosacral spine stiffness due to instrumentation. Risks, benefits and alternatives to total hip arthroplasty discussed in detail. Risks of bleeding which may necessitate transfusion, infection which may require secondary surgery, nerve damage which may result in temporary or permanent sensory and/or motor deficit, dislocation, instability, limb length inequality, intraoperative and postoperative fracture, and possible need for revision were discussed. Possibility of post surgical pain and stiffness possibly requiring manipulation under anesthesia discussed. The overall average satisfaction rate of approximately 95% after total hip arthoplasty was discussed. I do not feel the patient's history of Pseudomonas infection in his left proximal hamstrings is particularly relevant in the current setting given that it was 17 years ago and there is no recurrence. Patient and significant other verbalized understanding and elects to proceed with total hip arthroplasty in light of potential benefits.   Signed informed consent will be obtained on date of surgery.   Educational materials provided.     X-RAYS NEXT VISIT:  AP pelvis and crosstable lateral left hip postoperatively    Mark Rene MD,  CLAYTONOS    Orthopedic Trauma  Adult Reconstruction  Department Orthopedic Surgery  [facility]      Again, thank you for allowing me to participate in the care of your patient.        Sincerely,        Mark Rene MD    Electronically signed

## 2025-02-13 NOTE — NURSING NOTE
Teaching Flowsheet     Visit Type: In Clinic    Time Start: 3:35  Time End: 4:24  Total Time Spent: 50 min.    Surgeon: Dr. Rene  Location of Surgery (known or anticipated): [facility]  Type of Anesthesia: Choice with Block    Pertinent Medical History:  SLE, osteoporosis  Were medical conditions reviewed and appropriate for location? Yes  BMI: Normal BMI    Relevant Diagnosis:     Primary osteoarthritis of left hip     Teaching Topic:   ARTHROPLASTY, HIP, TOTAL DIRECT ANTERIOR APPROACH [46064 (CPT )] Left     She is interested in a same day discharge option.  She does qualify.    Person(s) involved in teaching:   Patient  : No.   Verified Patient's Phone Number: YES: [phone]    Caregiver//  Name: Ole  Phone Number: [phone]   Relationship:   Consent to Communicate on file: No     Motivation Level:  Asks Questions: Yes  Eager to Learn: Yes  Cooperative: Yes  Receptive (willing/able to accept information): Yes  Any cultural factors/Baptist beliefs that may influence understanding or compliance? No     Patient demonstrates understanding of the following:  Reason for the appointment, diagnosis and treatment plan: Yes  Knowledge of proper use of medications and conditions for which they are ordered (with special attention to potential side effects or drug interactions): Yes  Which situations necessitate calling provider and whom to contact: Yes     Teaching Concerns Addressed:   Proper use and care of medical equip, care aids, etc.: Yes  Nutritional needs and diet plan: Yes  Pain management techniques: Yes  Wound Care: Yes  How and/when to access community resources: Yes  Need for pre-op with in 30 days: YES, will be done with PCP. I asked them to ensure they go over their daily medications during this visit and discuss what medications need to be stopped before surgery and when. If you are doing a pre-op with your PCP and they are not within the [facility], I ask  them to fax it to our pre-op office. Patient verbalized understanding.       Does patient have difficulty getting a ride to appointments (post-ops, PT/OT): No  Patient's plan after discharge: home with family or spouse     Instructional Materials Used/Given:  two bottles of chlorhexidine soap, a surgery packet, and a joint booklet given to patient in clinic.     Discussed total joint online class.  Patient will call if they require help for signing up for the total joint zoom class. Patient understands they will need a preop exam within 30 days of date of surgery. Patient understands the expected length of stay and the expectation of outpatient physical therapy. Patient understands the need for an at home  after surgery and need for transportation home.  Discussed dental/non-sterile procedure prophylaxis. Discussed the [facility] Care Companion service.     - Important Contact Info/Phone Numbers: [facility] clinic number [phone]  - Map/location of surgery and follow-up appointments  - Showering instructions  - Stoplight Tool     -Next step: schedule a surgery date and schedule a pre-op with PCP.    Nina Tinajero RN

## 2025-02-17 ENCOUNTER — MYC MEDICAL ADVICE (OUTPATIENT)
Dept: ORTHOPEDICS | Facility: CLINIC | Age: 75
End: 2025-02-17
Payer: COMMERCIAL

## 2025-02-18 ENCOUNTER — TELEPHONE (OUTPATIENT)
Dept: ORTHOPEDICS | Facility: CLINIC | Age: 75
End: 2025-02-18
Payer: COMMERCIAL

## 2025-02-18 DIAGNOSIS — M16.12 PRIMARY OSTEOARTHRITIS OF LEFT HIP: Primary | ICD-10-CM

## 2025-02-18 NOTE — TELEPHONE ENCOUNTER
FUTURE VISIT INFORMATION      SURGERY INFORMATION:  Date: 4/2/25  Location:  OR  Surgeon:  Mark Rene MD   Anesthesia Type:  choice with block  Procedure: ARTHROPLASTY, HIP, TOTAL DIRECT ANTERIOR APPROACH   Consult: ov 2/13/25    RECORDS REQUESTED FROM:       Primary Care Provider:   Rudy Cisneros MD   - [facility]

## 2025-02-18 NOTE — TELEPHONE ENCOUNTER
Spoke to patient via telephone after receiving the below message from central prior authorization    This patient does not appear to meet medical necessity for the procedure: Hip Arthroplasty per payer guidelines. If you interpret the policy differently or have other information that shows the patient meets the criteria for medical necessity, please let us know.     For your reference, here is the link to the payor's policy:   [email or web address]     The patient's policy requires conservative management which must include physical therapy and one complementary conservative treatment strategy. Clinicals were reviewed and show that the patient does not wish to partake in physical therapy. The ONLY EXCEPTION to PT per the medical policy is as follows: In unusual circumstances there may be an exception if, for instance, intractable pain is so severe that physical therapy is not possible. It must be clearly documented why the patient cannot medically perform PT.     Note: medical policies are not authorizations and are set by the payer. There is no process for appealing these policies pre-service.     Please let us know how you wish to proceed as soon as possible. We may contact the patient if we do not receive a response.     Advised patient that unfortunately she will have to undergo 6 weeks of physical therapy prior to undergoing the total hip arthroplasty as there is no appeal process available due to insurance company policy.    Patient verbalized understanding.  All questions answered in detail.    She will return to clinic 6 weeks after beginning physical therapy for evaluation and likely agreeable candidate for total hip arthroplasty given her bone-on-bone articulation.    Mark Rene MD, FAAOS    Orthopedic Trauma  Adult Reconstruction  Department Orthopedic Surgery  [facility]  [place]

## 2025-02-19 ENCOUNTER — TELEPHONE (OUTPATIENT)
Dept: ORTHOPEDICS | Facility: CLINIC | Age: 75
End: 2025-02-19

## 2025-02-19 NOTE — TELEPHONE ENCOUNTER
There isn't a consent to communicate on file with .     Phone call to gia Handy. Patient is not currently with him. He is aware that patient's insurance was denied and that patient has to complete physical therapy first.   He does not think physical therapy is going to make any difference, so he wants to know what the process is moving forward for getting surgery approved or if injections will need to be tried first before surgery will be approved.  He was informed we do not have permission to speak with him. Asked that he have patient call back and give permission to speak with him. Otherwise, he will try to have them both on the line. He is aware they will most likely have to leave a message.     He also asks about getting a cost estimate for surgery. He was informed he may submit a request via [employer], or call contact [employer]. Their contact information was provided.   He verbalized understanding and will have patient call back.     JANIE Joe RN

## 2025-02-19 NOTE — TELEPHONE ENCOUNTER
Pt's  called.    The Prior Authorization for pt's surgery was denied.    Pt's  is wondering the next steps, the next Plan of Care.    Pt's  would like a CALL BACK to discuss. Thank you.

## 2025-02-26 ENCOUNTER — THERAPY VISIT (OUTPATIENT)
Dept: PHYSICAL THERAPY | Facility: REHABILITATION | Age: 75
End: 2025-02-26
Attending: ORTHOPAEDIC SURGERY
Payer: COMMERCIAL

## 2025-02-26 DIAGNOSIS — M16.12 PRIMARY OSTEOARTHRITIS OF LEFT HIP: ICD-10-CM

## 2025-02-26 PROCEDURE — 97116 GAIT TRAINING THERAPY: CPT | Mod: GP

## 2025-02-26 PROCEDURE — 97161 PT EVAL LOW COMPLEX 20 MIN: CPT | Mod: GP

## 2025-02-26 ASSESSMENT — ACTIVITIES OF DAILY LIVING (ADL)
GOING_DOWN_1_FLIGHT_OF_STAIRS: EXTREME DIFFICULTY
PLEASE_INDICATE_YOR_PRIMARY_REASON_FOR_REFERRAL_TO_THERAPY:: HIP
PUTTING ON SOCKS AND SHOES: UNABLE TO DO
GOING DOWN 1 FLIGHT OF STAIRS: EXTREME DIFFICULTY
STANDING FOR 15 MINUTES: MODERATE DIFFICULTY
WALKING_APPROXIMATELY_10_MINUTES: EXTREME DIFFICULTY
GETTING_INTO_AND_OUT_OF_AN_AVERAGE_CAR: EXTREME DIFFICULTY
LANDING: UNABLE TO DO
STEPPING_UP_AND_DOWN_CURBS: EXTREME DIFFICULTY
DEEP_SQUATTING: UNABLE TO DO
HOW_WOULD_YOU_RATE_YOUR_CURRENT_LEVEL_OF_FUNCTION?: SEVERELY ABNORMAL
ROLLING OVER IN BED: MODERATE DIFFICULTY
GOING UP 1 FLIGHT OF STAIRS: EXTREME DIFFICULTY
TWISTING/PIVOTING_ON_INVOLVED_LEG: EXTREME DIFFICULTY
HOS_ADL_SCORE(%): 22.06
HOW_WOULD_YOU_RATE_YOUR_CURRENT_LEVEL_OF_FUNCTION_DURING_YOUR_SPORTS_RELATED_ACTIVITIES_FROM_0_TO_100_WITH_100_BEING_YOUR_LEVEL_OF_FUNCTION_PRIOR_TO_YOUR_HIP_PROBLEM_AND_0_BEING_THE_INABILITY_TO_PERFORM_ANY_OF_YOUR_USUAL_DAILY_ACTIVITIES?: 0
DEEP SQUATTING: UNABLE TO DO
LIGHT_TO_MODERATE_WORK: EXTREME DIFFICULTY
LOW_IMPACT_ACTIVITIES_LIKE_FAST_WALKING: UNABLE TO DO
ADL_COUNT: 17
STARTING_AND_STOPPING_QUICKLY: UNABLE TO DO
STANDING_FOR_15_MINUTES: MODERATE DIFFICULTY
HEAVY_WORK: UNABLE TO DO
WALKING_15_MINUTES_OR_GREATER: UNABLE TO DO
TWISTING/PIVOTING ON INVOLVED LEG: EXTREME DIFFICULTY
WALKING_15_MINUTES_OR_GREATER: UNABLE TO DO
HOS_ADL_HIGHEST_POTENTIAL_SCORE: 68
WALKING_FOR_APPROXIMATELY_10_MINUTES: EXTREME DIFFICULTY
SPORTS_COUNT: 9
ROLLING_OVER_IN_BED: MODERATE DIFFICULTY
ADL_TOTAL_ITEM_SCORE: 0
SWINGING_OBJECTS_LIKE_A_GOLF_CLUB: UNABLE TO DO
SPORTS_TOTAL_ITEM_SCORE: 0
HOW_WOULD_YOU_RATE_YOUR_CURRENT_LEVEL_OF_FUNCTION_DURING_YOUR_USUAL_ACTIVITIES_OF_DAILY_LIVING_FROM_0_TO_100_WITH_100_BEING_YOUR_LEVEL_OF_FUNCTION_PRIOR_TO_YOUR_HIP_PROBLEM_AND_0_BEING_THE_INABILITY_TO_PERFORM_ANY_OF_YOUR_USUAL_DAILY_ACTIVITIES?: 25
STEPPING UP AND DOWN CURBS: EXTREME DIFFICULTY
WALKING_INITIALLY: EXTREME DIFFICULTY
WALKING_DOWN_STEEP_HILLS: EXTREME DIFFICULTY
SPORTS_SCORE(%): 0
SPORTS_HIGHEST_POTENTIAL_SCORE: 36
SITTING FOR 15 MINUTES: MODERATE DIFFICULTY
WALKING_UP_STEEP_HILLS: EXTREME DIFFICULTY
WALKING_UP_STEEP_HILLS: EXTREME DIFFICULTY
HOW_WOULD_YOU_RATE_YOUR_CURRENT_LEVEL_OF_FUNCTION_DURING_YOUR_USUAL_ACTIVITIES_OF_DAILY_LIVING_FROM_0_TO_100_WITH_100_BEING_YOUR_LEVEL_OF_FUNCTION_PRIOR_TO_YOUR_HIP_PROBLEM_AND_0_BEING_THE_INABILITY_TO_PERFORM_ANY_OF_YOUR_USUAL_DAILY_ACTIVITIES?: 25
ADL_SCORE(%): 0
WALKING_DOWN_STEEP_HILLS: EXTREME DIFFICULTY
ABILITY_TO_PARTICIPATE_IN_YOUR_DESIRED_SPORT_AS_LONG_AS_YOU_WOULD_LIKE: UNABLE TO DO
ADL_HIGHEST_POTENTIAL_SCORE: 68
GETTING_INTO_AND_OUT_OF_A_BATHTUB: EXTREME DIFFICULTY
PUTTING_ON_SOCKS_AND_SHOES: UNABLE TO DO
CUTTING/LATERAL_MOVEMENTS: UNABLE TO DO
WALKING_INITIALLY: EXTREME DIFFICULTY
GOING_UP_1_FLIGHT_OF_STAIRS: EXTREME DIFFICULTY
GETTING_INTO_AND_OUT_OF_A_BATHTUB: EXTREME DIFFICULTY
RUNNING_ONE_MILE: UNABLE TO DO
HEAVY_WORK: UNABLE TO DO
ABILITY_TO_PERFORM_ACTIVITY_WITH_YOUR_NORMAL_TECHNIQUE: UNABLE TO DO
HOS_ADL_ITEM_SCORE_TOTAL: 15
RECREATIONAL ACTIVITIES: UNABLE TO DO
RECREATIONAL_ACTIVITIES: UNABLE TO DO
LIGHT_TO_MODERATE_WORK: EXTREME DIFFICULTY
SITTING_FOR_15_MINUTES: MODERATE DIFFICULTY
JUMPING: UNABLE TO DO
GETTING INTO AND OUT OF AN AVERAGE CAR: EXTREME DIFFICULTY

## 2025-02-26 NOTE — PROGRESS NOTES
PHYSICAL THERAPY EVALUATION  Type of Visit: Evaluation       Fall Risk Screen:  Have you fallen 2 or more times in the past year?: Yes  Have you fallen and had an injury in the past year?: Yes  Is patient a fall risk?: Yes  Fall screen comments: slipped on snowy hillside and hurt shoulder, tripped on uneven sidewalk while snowing and hurt shoulder    Subjective         Presenting condition or subjective complaint: pain in left hip joint    Hx of hip pain, but with significant worsening of left hip pain over the past 2 months. Feels hip dislocates at times as well as makes a crunching sensation when pivoting on a planted foot. Surgeon is recommended PEREZ, which is scheduled 4/2/25. Hoping for therapy to help with pain mgmt and strength leading up to surgery. PMH significant for lumbar spine surgery and chronic, persistent low back pain that worsens with prolonged sitting and walking.Can get numbness in right leg that she fears will cause her leg to give out and her to fall.    Previous exercise routine:  30 minutes, yoga daily, pickleball ( a lot over past 3 years)     Current exercise routine: some standing and supine yoga poses as long as hip doesn't abduct, adductor or extend    Date of onset: 12/26/24    Relevant medical history: Arthritis; Cold or hot arm or leg; Depression; History of fractures; Menopause; Neck injury; Osteoarthritis; Osteoporosis   Dates & types of surgery: avulsed hamstring,  spinal fusion //dates? Hamstring avulsed on left    Prior diagnostic imaging/testing results: X-ray     Prior therapy history for the same diagnosis, illness or injury: No      Prior Level of Function  Transfers: Independent  Ambulation: Independent  ADL: Independent  IADL:     Living Environment  Social support: With a significant other or spouse   Type of home: House   Stairs to enter the home: No       Ramp: No   Stairs inside the home: Yes 13 Is there a railing: Yes     Help at home: None  Equipment owned: Crutches;  Grab bars; Raised toilet seat     Employment: No    Hobbies/Interests: walking the dog, yoga, reading    Patient goals for therapy: walk, climb stairs, go to grocery store, get into and out of car, put on socks and shoes, play pickleball, do yoga,  lift up things from floor, do laundry without pain    Pain assessment:      Objective   HIP EVALUATION  PAIN: Pain Level at Rest: 0/10  Pain Level with Use: 8/10  Pain Location: knee  Pain Quality: Aching, Sharp, and Unbearable  Pain Frequency: intermittent  Pain is Worst: pain upon rising in the morning  Pain is Exacerbated By: car transfers,standing or walking 15-20 minutes   Pain is Relieved By: NSAIDs and rest  Pain Progression: Worsened  INTEGUMENTARY (edema, incisions):   POSTURE: Standing Posture: Rounded shoulders, Forward head, Lordosis decreased, Thoracic kyphosis increased  GAIT:   Weightbearing Status:   Assistive Device(s):   Gait Deviations: Antalgic  Base of support increased  Stance time decreased  Stride length decreased  Nikki decreased  BALANCE/PROPRIOCEPTION:  able to maintain ~ 5 seconds in SLS on right, and <3 seconds on left  WEIGHTBEARING ALIGNMENT:   NON-WEIGHTBEARING ALIGNMENT:    ROM:   (Degrees) Left AROM Left PROM  Right AROM Right PROM   Hip Flexion  95*  WNL   Hip Extension       Hip Abduction  10*  WNL   Hip Adduction       Hip Internal Rotation  10*  WNL   Hip External Rotation  35*  WNL   Knee Flexion  WNL  WNL   Knee Extension  WNL  WNL   Lumbar Sidebend WNL WNL   Lumbar Flexion Fingertips to midshin, subluxation of left hip at end range lumbar flexion, reduced with return to stand   Lumbar Extension Significant loss   Pain:   End feel:     PELVIC/SI SCREEN:   STRENGTH:   Pain: - none + mild ++ moderate +++ severe  Strength Scale: 0-5/5 Left Right   Hip Flexion 3+ 4   Hip Extension     Hip Abduction 2+ 3+   Hip Adduction     Hip Internal Rotation     Hip External Rotation 3 4-   Knee Flexion 3+ 4+   Knee Extension 3+ 4+     LE  FLEXIBILITY:     SPECIAL TESTS:   FUNCTIONAL TESTS:   PALPATION:   JOINT MOBILITY: .    Assessment & Plan   CLINICAL IMPRESSIONS  Medical Diagnosis: M16.12 (ICD-10-CM) - Primary osteoarthritis of left hip    Treatment Diagnosis: left hip pain and stiffness, gait deficits, limited activity tolerance, left hip weakness   Impression/Assessment: Patient is a 74 year old female with left hip pain, weakness and stiffness complaints.  The following significant findings have been identified: Pain, Decreased ROM/flexibility, Decreased joint mobility, Decreased strength, Impaired balance, Impaired gait, Impaired muscle performance, Decreased activity tolerance, and Impaired posture. These impairments interfere with their ability to perform self care tasks, recreational activities, household chores, household mobility, and community mobility as compared to previous level of function.     Clinical Decision Making (Complexity):  Clinical Presentation: Stable/Uncomplicated  Clinical Presentation Rationale: based on medical and personal factors listed in PT evaluation  Clinical Decision Making (Complexity): Low complexity    PLAN OF CARE  Treatment Interventions:  Interventions: Gait Training, Manual Therapy, Neuromuscular Re-education, Therapeutic Activity, Therapeutic Exercise, Self-Care/Home Management    Long Term Goals     PT Goal 1  Goal Identifier: HEP  Goal Description: pt will demonstrate independence and report compliance with HEP to facilitate improved independent symptom mgmt.  Rationale: to maximize safety and independence with performance of ADLs and functional tasks;to maximize safety and independence with self cares  Target Date: 04/09/25  PT Goal 2  Goal Identifier: car transfer  Goal Description: pt will demonstrate ability to transfer in/out of car with modifications and AD as needed, with less than or equal to 3/10 left hip pain.  Rationale: to maximize safety and independence with performance of ADLs and  functional tasks;to maximize safety and independence within the home;to maximize safety and independence within the community;to maximize safety and independence with self cares  Target Date: 04/09/25  PT Goal 3  Goal Identifier: walking  Goal Description: pt will report ability to ambulate for greater than or equal to 30 minutes with AD or none with less than or equal to 3/10 left hip pain.  Rationale: to maximize safety and independence with performance of ADLs and functional tasks;to maximize safety and independence within the home;to maximize safety and independence within the community;to maximize safety and independence with self cares  Target Date: 04/09/25      Frequency of Treatment: 1x/week  Duration of Treatment: up to 6 weeks    Recommended Referrals to Other Professionals:   Education Assessment:   Learner/Method: Patient    Risks and benefits of evaluation/treatment have been explained.   Patient/Family/caregiver agrees with Plan of Care.     Evaluation Time:     PT Eval, Low Complexity Minutes (60260): 25       Signing Clinician: Savannah Camarena PT        [facility]                                                                                   OUTPATIENT PHYSICAL THERAPY      PLAN OF TREATMENT FOR OUTPATIENT REHABILITATION   Patient's Last Name, First Name, Leeanne Cowart YOB: 1950   Provider's Name   [facility]   Medical Record No.  [id number]     Onset Date: 12/26/24  Start of Care Date: 02/26/25     Medical Diagnosis:  M16.12 (ICD-10-CM) - Primary osteoarthritis of left hip      PT Treatment Diagnosis:  left hip pain and stiffness, gait deficits, limited activity tolerance, left hip weakness Plan of Treatment  Frequency/Duration: 1x/week/ up to 6 weeks    Certification date from 02/26/25 to 04/09/25         See note for plan of treatment details and functional goals     Savannah Camarena PT                         I  CERTIFY THE NEED FOR THESE SERVICES FURNISHED UNDER        THIS PLAN OF TREATMENT AND WHILE UNDER MY CARE     (Physician attestation of this document indicates review and certification of the therapy plan).              Referring Provider:  Mark Rene    Initial Assessment  See Epic Evaluation- Start of Care Date: 02/26/25

## 2025-03-10 ENCOUNTER — PRE VISIT (OUTPATIENT)
Dept: SURGERY | Facility: CLINIC | Age: 75
End: 2025-03-10

## 2025-05-07 ENCOUNTER — TELEPHONE (OUTPATIENT)
Dept: ORTHOPEDICS | Facility: CLINIC | Age: 75
End: 2025-05-07

## 2025-05-07 ENCOUNTER — TELEPHONE (OUTPATIENT)
Dept: INTERNAL MEDICINE | Facility: CLINIC | Age: 75
End: 2025-05-07
Payer: COMMERCIAL

## 2025-05-07 NOTE — TELEPHONE ENCOUNTER
Returned patients call to get surgery scheduled with Dr. Rene. LVM to return my call.             Regina Verde on 5/7/2025 at 1:38 PM

## 2025-05-07 NOTE — TELEPHONE ENCOUNTER
05/07/2025    Pt called and wanted to know when her next prolia injection is? TC advised next prolia is due 06/27/2025 or after. TC rescheduled her appointment from 06/02/2025 to 06/30/2025.    Nothing else needed at this time.    Yuli Deng

## 2025-05-07 NOTE — TELEPHONE ENCOUNTER
Patient scheduled for prolia injection 6/2/25.    Last injection 12/27/24.     Patient is not due until 6/27/25 or later and she will need to be rescheduled.     Attempted to contact and spouse answered. Took message and will have pt call back.

## 2025-05-08 DIAGNOSIS — Z92.29 PERSONAL HISTORY OF OTHER DRUG THERAPY: ICD-10-CM

## 2025-05-08 DIAGNOSIS — M81.0 AGE-RELATED OSTEOPOROSIS WITHOUT CURRENT PATHOLOGICAL FRACTURE: Primary | ICD-10-CM

## 2025-05-09 ENCOUNTER — MYC MEDICAL ADVICE (OUTPATIENT)
Dept: ORTHOPEDICS | Facility: CLINIC | Age: 75
End: 2025-05-09
Payer: COMMERCIAL

## 2025-05-12 NOTE — TELEPHONE ENCOUNTER
Teaching Flowsheet     Visit Type: Telephone    Time Start: .  Time End: .  Total Time Spent: 15 min.    Surgeon: Edgard  Location of Surgery (known or anticipated): .  Type of Anesthesia: Choice with Block  Worker's Compensation Procedure: No    Pertinent Medical History: .  Were medical conditions reviewed and appropriate for location? Yes  BMI: Normal BMI    Relevant Diagnosis: Left Hip  Teaching Topic: preop    Person(s) involved in teaching:   Patient  : No.   Verified Patient's Phone Number: YES: .    Caregiver//  Name: INFORMED PATIENT NEEDS  & ADULT MUST STAY WITH PATIENT FOR 24H AFTER ANESTHESIA    Phone Number: .   Relationship: .  Consent to Communicate on file: Yes  Authorization to Share Protected Health Information- Person to person communication signed by patient and authorized the following person or people:      Motivation Level:  Asks Questions: Yes  Eager to Learn: Yes  Cooperative: Yes  Receptive (willing/able to accept information): Yes  Any cultural factors/Hindu beliefs that may influence understanding or compliance? No     Patient demonstrates understanding of the following:  Reason for the appointment, diagnosis and treatment plan: Yes  Knowledge of proper use of medications and conditions for which they are ordered (with special attention to potential side effects or drug interactions): Yes  Which situations necessitate calling provider and whom to contact: Yes     Teaching Concerns Addressed:   Proper use and care of medical equip, care aids, etc.: Yes  Nutritional needs and diet plan: Yes  Pain management techniques: Yes  Wound Care: Yes  How and/when to access community resources: Yes  Need for pre-op with in 30 days: YES, will be done with PCP. I asked them to ensure they go over their daily medications during this visit and discuss what medications need to be stopped before surgery and when. If you are doing a pre-op with your PCP and they are not  within the [facility], I ask them to fax it to our pre-op office. Patient verbalized understanding.       Does patient have difficulty getting a ride to appointments (post-ops, PT/OT): No  Patient's plan after discharge: home with family or spouse     Instructional Materials Used/Given:  two bottles of chlorhexidine soap and a surgery packet given to patient in clinic. Instructed patient to buy or get two 8 ounces bottles of antiseptic surgical soap called 4% CHG. Common name brand of this soap are Hibiclens and Exidine. I told them they can find this at their local pharmacy, clinic or retail store. If they have trouble finding it, I told them to ask their pharmacist to help them find a substitute.   - Important Contact Info/Phone Numbers: [facility] clinic number [phone] and after hours number [phone]  - Map/location of surgery and follow-up appointments  - Showering instructions  - Stoplight Tool     -Next step: ..    Alberta Godoy RN

## 2025-05-23 NOTE — PROGRESS NOTES
[facility]   ORTHOPEDIC SURGERY CLINIC  [place]    PATIENT:   Leeanne Duarte  YOB: 1950  DATE OF SERVICE:   05/27/25    CHIEF COMPLAINT:  Left Hip Pain     PROCEDURES:  04/03/2019 - Lane Ventura MD   1. L3 through L4 laminectomy and partial medial facetectomies and foraminotomies.  2. L5-S1 Kaminski Maxwell Osteotomy for regional deformity correction.  3. Transforaminal lumbar interbody fusion at L4-5 and L5-S1, anterior and posterior fusion through a single approach.  4. L4-5 and L5-S1 Capstone PEEK Cage.  5. L4 through S1 Posterior Spinal Fusion.  6. L4 through Pelvis Posterior Spinal Instrumentation, Medtronic Solera.  7. [place] and use of Iliac Crest Autograft, which was harvested through a separate fascial incision.  8. Use of O-Arm navigational guidance.  9. Use of Allograft    HISTORY OF PRESENT ILLNESS:  Leeanne Duarte is a 75 year old who is being seen as a follow up of left hip pain.  Since last visit on 2/13/2025, She states her symptoms have been getting significantly worse over the past 2 or 3 weeks, and was having trouble ambulating.  She has been feeling better over the past few days.  Patient states she does have a cane that she does not use.  Her  states that her mobility has gone down recently, due to an increase of pain.  Today she reports profound increase in pain over the last 3 months and decreased activity level.  Her pain is located in the groin area.  She has trouble with hip flexion.  She has trouble with getting in and out of a car, squatting, yoga and walking.  The pain is beginning to impact her daily activities.  She has been utilizing acetaminophen, anti-inflammatories and undergoing physical therapy in her gym without any durable improvement.  She is becoming distraught regarding the pain and decreased function.  She denies any numbness or tingling to the left lower extremity, but does have some in  her right side.  She states she can feel and hear some grinding in the left hip with external and internal rotation of the hip.  She feels she has lost some strength in the left side, which is affecting her balance.  She feels her pain and dysfunction affecting her quality of life to an extent that she is no longer willing to tolerate.  She is not interested in pursuing any additional nonsurgical treatments.  She denies any history of injury, infections or surgeries to the left hip joint.  She does have a history however of a Pseudomonas infection of the left proximal hamstring area status post hamstring repair by Dr. Hieu Hoskins on 9/10/2008 close complicated by a postoperative fall resulting in failure of the repair and subsequently returned to the operating room for left sciatic nerve neurolysis with revision repair, partial tear, left proximal hamstring by Dr. Hoskins on 10/2/2008.  She reports no further issues with regards to infection of the left hamstring area and reports no longer being on any type of isolation for history of Pseudomonas infection.     PHYSICAL EXAM:  Awake alert and oriented in no apparent distress.  Focused exam of the left lower extremity demonstrates no deformity.    Skin is clean, dry and intact.   No erythema, warmth, ecchymosis or swelling.  No lymphedema.  No knee effusion.  No tenderness to palpation over the.  Range of motion:  Hip: extension 0 , flexion 90  with severe pain, internal rotation 0  with severe pain, external rotation 30 .  Knee: extension 0 , flexion 140   Ankle: dorsiflexion 20 , plantarflexion 50   CMS intact distally.  Intact sensation in lateral femoral cutaneous, saphenous, sural, superficial peroneal, deep peroneal and tibial distributions.    Motor intact in quadriceps, hamstrings, abductors, tibialis anterior, extensor hallucis longus, and gastrocsoleus.   Pedal pulses intact and capillary refill brisk.    IMAGING:  AP pelvis and crosstable lateral left  hip obtained today personally reviewed by me.  Previous imaging reviewed.  No acute displaced pelvic or proximal femur fracture is identified.   Unchanged severe left and mild right hip osteoarthritis.   Chronic bony deformity along the left inferior pubis with mineralization along the left ischial tuberosity.   Extensive postop change lumbosacral spine with bilateral iliac screws.   Diffuse bone demineralization.   Chondrocalcinosis at the symphysis pubis.   Symmetric SI joints.   Arterial calcification.    LABS:  N follow-up one    ASSESSMENT:  None    PLAN:  Nonoperative management options for osteoarthritis were discussed in great detail with the patient. These include Physical Therapy with neuromuscular training (i.e. stretching, strengthening, balance, agility, coordination) programs in combination with traditional exercise, weight loss, assistive devices including canes or walkers, oral and topical NSAIDs, acetaminophen, and intra-articular corticosteroid injections.  At this point the patient would like to proceed with left total hip arthroplasty in light of failure of previous conservative management.  Leeanne Duarte has severe, disabling pain and loss of hip function to the extent which interferes with ability to carry out age appropriate activities of daily living.  Patient does demonstrate a function-limiting pain at short distances for at least 24 months duration. Patient has failed appropriate course of provider-directed non-surgical management for at least 3 months. This includes activity modification, physical therapy and oral medications as well as consideration of intra-articular injections.  The potential of any meaningful relief with intra-articular corticosteroid injections is minimal given the advanced nature of the osteoarthritis.  This would serve only to delay arthroplasty which is ultimately treatment required.  Patient has advanced radiographic imaging which correlates with the  individual's reported symptoms and physical exam findings. This includes x-rays which shows evidence of advanced severe degenerative changes including joint space narrowing and osteophyte formation.   Patient's medical chart was reviewed and they do not have any specific contraindications to total hip arthroplasty that would have a unacceptable risk of compromising the outcome.   Leeanne Duarte is therefore a appropriate candidate for left total hip arthroplasty via direct anterior approach.    Risks, benefits and alternatives to total hip arthroplasty discussed in detail. Risks of bleeding which may necessitate transfusion, infection which may require secondary surgery, nerve damage which may result in temporary or permanent sensory and/or motor deficit, dislocation, instability, limb length inequality, intraoperative and postoperative fracture, and possible need for revision were discussed. Possibility of post surgical pain and stiffness possibly requiring manipulation under anesthesia discussed. The overall average satisfaction rate of approximately 95% after total hip arthoplasty was discussed.   Patient verbalized understanding and elects to proceed with total hip arthroplasty in light of potential benefits.   Signed informed consent will be obtained on date of surgery.   Educational materials provided..     X-RAYS NEXT VISIT:  AP pelvis crosstable lateral left hip postoperatively    Mark Rene MD, FAAOS    Orthopedic Trauma  Adult Reconstruction  Department of Orthopedic Surgery  [facility]

## 2025-05-27 ENCOUNTER — ANCILLARY PROCEDURE (OUTPATIENT)
Dept: GENERAL RADIOLOGY | Facility: CLINIC | Age: 75
End: 2025-05-27
Attending: ORTHOPAEDIC SURGERY
Payer: COMMERCIAL

## 2025-05-27 ENCOUNTER — OFFICE VISIT (OUTPATIENT)
Dept: ORTHOPEDICS | Facility: CLINIC | Age: 75
End: 2025-05-27
Payer: COMMERCIAL

## 2025-05-27 VITALS — HEIGHT: 59 IN | WEIGHT: 93.8 LBS | BODY MASS INDEX: 18.91 KG/M2

## 2025-05-27 DIAGNOSIS — M16.12 PRIMARY OSTEOARTHRITIS OF LEFT HIP: Primary | ICD-10-CM

## 2025-05-27 DIAGNOSIS — M16.12 PRIMARY OSTEOARTHRITIS OF LEFT HIP: ICD-10-CM

## 2025-05-27 PROCEDURE — 99214 OFFICE O/P EST MOD 30 MIN: CPT | Performed by: ORTHOPAEDIC SURGERY

## 2025-05-27 PROCEDURE — 73502 X-RAY EXAM HIP UNI 2-3 VIEWS: CPT | Mod: TC | Performed by: INTERNAL MEDICINE

## 2025-05-27 ASSESSMENT — HOOS JR
SITTING: MILD
BENDING TO THE FLOOR TO PICK UP OBJECT: SEVERE
LYING IN BED (TURNING OVER, MAINTAINING HIP POSITION): MILD
GOING UP OR DOWN STAIRS: MODERATE
HOOS JR TOTAL INTERVAL SCORE: 55.99
WALKING ON UNEVEN SURFACE: MODERATE
RISING FROM SITTING: MODERATE

## 2025-05-27 NOTE — LETTER
5/27/2025      Leeanne Duarte  [address]  [place] [address]      Dear Colleague,    Thank you for referring your patient, Leeanne Duarte, to the [facility] [place]. Please see a copy of my visit note below.    [facility]   [facility]   ORTHOPEDIC SURGERY CLINIC  [place]    PATIENT:   Leeanne Duarte  YOB: 1950  DATE OF SERVICE:   05/27/25    CHIEF COMPLAINT:  Left Hip Pain     PROCEDURES:  04/03/2019 - Lane Ventura MD   1. L3 through L4 laminectomy and partial medial facetectomies and foraminotomies.  2. L5-S1 Kaminski Maxwell Osteotomy for regional deformity correction.  3. Transforaminal lumbar interbody fusion at L4-5 and L5-S1, anterior and posterior fusion through a single approach.  4. L4-5 and L5-S1 Capstone PEEK Cage.  5. L4 through S1 Posterior Spinal Fusion.  6. L4 through Pelvis Posterior Spinal Instrumentation, Medtronic Solera.  7. [place] and use of Iliac Crest Autograft, which was harvested through a separate fascial incision.  8. Use of O-Arm navigational guidance.  9. Use of Allograft    HISTORY OF PRESENT ILLNESS:  Leeanne Duarte is a 75 year old who is being seen as a follow up of left hip pain.  Since last visit on 2/13/2025, She states her symptoms have been getting significantly worse over the past 2 or 3 weeks, and was having trouble ambulating.  She has been feeling better over the past few days.  Patient states she does have a cane that she does not use.  Her  states that her mobility has gone down recently, due to an increase of pain.  Today she reports profound increase in pain over the last 3 months and decreased activity level.  Her pain is located in the groin area.  She has trouble with hip flexion.  She has trouble with getting in and out of a car, squatting, yoga and walking.  The pain is beginning to impact her daily activities.  She has been utilizing  acetaminophen, anti-inflammatories and undergoing physical therapy in her gym without any durable improvement.  She is becoming distraught regarding the pain and decreased function.  She denies any numbness or tingling to the left lower extremity, but does have some in her right side.  She states she can feel and hear some grinding in the left hip with external and internal rotation of the hip.  She feels she has lost some strength in the left side, which is affecting her balance.  She feels her pain and dysfunction affecting her quality of life to an extent that she is no longer willing to tolerate.  She is not interested in pursuing any additional nonsurgical treatments.  She denies any history of injury, infections or surgeries to the left hip joint.  She does have a history however of a Pseudomonas infection of the left proximal hamstring area status post hamstring repair by Dr. Hieu Hoskins on 9/10/2008 close complicated by a postoperative fall resulting in failure of the repair and subsequently returned to the operating room for left sciatic nerve neurolysis with revision repair, partial tear, left proximal hamstring by Dr. Hoskins on 10/2/2008.  She reports no further issues with regards to infection of the left hamstring area and reports no longer being on any type of isolation for history of Pseudomonas infection.     PHYSICAL EXAM:  Awake alert and oriented in no apparent distress.  Focused exam of the left lower extremity demonstrates no deformity.    Skin is clean, dry and intact.   No erythema, warmth, ecchymosis or swelling.  No lymphedema.  No knee effusion.  No tenderness to palpation over the.  Range of motion:  Hip: extension 0 , flexion 90  with severe pain, internal rotation 0  with severe pain, external rotation 30 .  Knee: extension 0 , flexion 140   Ankle: dorsiflexion 20 , plantarflexion 50   CMS intact distally.  Intact sensation in lateral femoral cutaneous, saphenous, sural, superficial  peroneal, deep peroneal and tibial distributions.    Motor intact in quadriceps, hamstrings, abductors, tibialis anterior, extensor hallucis longus, and gastrocsoleus.   Pedal pulses intact and capillary refill brisk.    IMAGING:  AP pelvis and crosstable lateral left hip obtained today personally reviewed by me.  Previous imaging reviewed.  No acute displaced pelvic or proximal femur fracture is identified.   Unchanged severe left and mild right hip osteoarthritis.   Chronic bony deformity along the left inferior pubis with mineralization along the left ischial tuberosity.   Extensive postop change lumbosacral spine with bilateral iliac screws.   Diffuse bone demineralization.   Chondrocalcinosis at the symphysis pubis.   Symmetric SI joints.   Arterial calcification.    LABS:  N follow-up one    ASSESSMENT:  None    PLAN:  Nonoperative management options for osteoarthritis were discussed in great detail with the patient. These include Physical Therapy with neuromuscular training (i.e. stretching, strengthening, balance, agility, coordination) programs in combination with traditional exercise, weight loss, assistive devices including canes or walkers, oral and topical NSAIDs, acetaminophen, and intra-articular corticosteroid injections.  At this point the patient would like to proceed with left total hip arthroplasty in light of failure of previous conservative management.  Leeanne Duarte has severe, disabling pain and loss of hip function to the extent which interferes with ability to carry out age appropriate activities of daily living.  Patient does demonstrate a function-limiting pain at short distances for at least 24 months duration. Patient has failed appropriate course of provider-directed non-surgical management for at least 3 months. This includes activity modification, physical therapy and oral medications as well as consideration of intra-articular injections.  The potential of any meaningful relief  with intra-articular corticosteroid injections is minimal given the advanced nature of the osteoarthritis.  This would serve only to delay arthroplasty which is ultimately treatment required.  Patient has advanced radiographic imaging which correlates with the individual's reported symptoms and physical exam findings. This includes x-rays which shows evidence of advanced severe degenerative changes including joint space narrowing and osteophyte formation.   Patient's medical chart was reviewed and they do not have any specific contraindications to total hip arthroplasty that would have a unacceptable risk of compromising the outcome.   Leeanne Duarte is therefore a appropriate candidate for left total hip arthroplasty via direct anterior approach.    Risks, benefits and alternatives to total hip arthroplasty discussed in detail. Risks of bleeding which may necessitate transfusion, infection which may require secondary surgery, nerve damage which may result in temporary or permanent sensory and/or motor deficit, dislocation, instability, limb length inequality, intraoperative and postoperative fracture, and possible need for revision were discussed. Possibility of post surgical pain and stiffness possibly requiring manipulation under anesthesia discussed. The overall average satisfaction rate of approximately 95% after total hip arthoplasty was discussed.   Patient verbalized understanding and elects to proceed with total hip arthroplasty in light of potential benefits.   Signed informed consent will be obtained on date of surgery.   Educational materials provided..     X-RAYS NEXT VISIT:  AP pelvis crosstable lateral left hip postoperatively    Mark Rene MD, FAAOS    Orthopedic Trauma  Adult Reconstruction  Department of Orthopedic Surgery  [facility]    Again, thank you for allowing me to participate in the care of your patient.         Sincerely,        Mark Rene MD    Electronically signed

## 2025-06-05 ENCOUNTER — TELEPHONE (OUTPATIENT)
Dept: FAMILY MEDICINE | Facility: CLINIC | Age: 75
End: 2025-06-05
Payer: COMMERCIAL

## 2025-06-05 ENCOUNTER — TELEPHONE (OUTPATIENT)
Dept: ORTHOPEDICS | Facility: CLINIC | Age: 75
End: 2025-06-05
Payer: COMMERCIAL

## 2025-06-05 NOTE — TELEPHONE ENCOUNTER
ANJANA STATING THAT I COULD POSSIBLY GET HER IN SOONER FOR SURGERY BUT NOT A GUARANTEE. I ADVISED HER TO CALL ME BACK TO CONFIRM IF 7/9 WOULD BE A POSSIBILITY                  Regina Verde on 6/5/2025 at 1:07 PM .

## 2025-06-05 NOTE — TELEPHONE ENCOUNTER
Patient calling with question about Prolia and Hip surgery.     Due to surgery cancellation, there is an earlier opening for her to do hip surgery on 6/18/25 or 6/30/25.     Patient next Prolia shot is scheduled on 6/30/25. Her last injection was on 12/27/2024.     Patient is wanted to know what is the best time for her to get the surgery and get her Prolia injection next?      I inform her that Dr. Cisneros will be back next week. I will forward this to her basket for her to review. Agreeing to plan. Pt would like to know as soon as possible because she would have to set up an pre-op appointment with PCP. No further questions at this time.     Call back ph: [phone].     Foster DASH RN

## 2025-06-09 RX ORDER — IBUPROFEN 400 MG/1
400 TABLET, FILM COATED ORAL EVERY 6 HOURS PRN
Status: ON HOLD | COMMUNITY
End: 2025-06-18

## 2025-06-09 RX ORDER — ACETAMINOPHEN 500 MG
500-1000 TABLET ORAL EVERY 6 HOURS PRN
Status: ON HOLD | COMMUNITY
End: 2025-06-18

## 2025-06-09 NOTE — PROVIDER NOTIFICATION
06/09/25 1325   Discharge Planning   Patient/Family Anticipates Transition to home with family   Concerns to be Addressed denies needs/concerns at this time   Living Arrangements   People in Home spouse   Type of Residence Private Residence   Is your private residence a single family home or apartment? Single family home   Number of Stairs, Within Home, Primary greater than 10 stairs   Stair Railings, Within Home, Primary railings safe and in good condition   Once home, are you able to live on one level? Yes   Which level? Main Level   Bathroom Shower/Tub Walk-in shower   Equipment Currently Used at Home grab bar, toilet;grab bar, tub/shower;raised toilet seat   Support System   Support Systems Spouse/Significant Other   Do you have someone available to stay with you one or two nights once you are home? Yes   Blood   Known Bleeding Disorder or Coagulopathy No   Does the patient have any Spiritism/cultural preferences related to blood products? No   Education   Has the patient scheduled or completed pre-op total joint education, either in class or online, in the last 12 months? No   Falls Risk   Has the patient been identified as a high falls risk before surgery? (fallen in the last 6 months, history of falls, unsteady gait, or balance issues) No   Did an order get placed to attend outpatient pre-surgery balance evaluation? No   Relationship/Environment   Name(s) of People in Home Ole (spouse)     Lillian Corrales RN on 6/9/2025 at 1:29 PM

## 2025-06-11 NOTE — TELEPHONE ENCOUNTER
Outgoing call #1 to patient, No answer. LVMTCB.     When patient calls back, Anyone can relay to her that Dr. Cisneros said Prolia will not interfere with her hip surgery and she can have it as planned before.    Amy SALVADOR RN

## 2025-06-12 ENCOUNTER — OFFICE VISIT (OUTPATIENT)
Dept: FAMILY MEDICINE | Facility: CLINIC | Age: 75
End: 2025-06-12
Payer: COMMERCIAL

## 2025-06-12 VITALS
SYSTOLIC BLOOD PRESSURE: 101 MMHG | BODY MASS INDEX: 20.47 KG/M2 | WEIGHT: 97.5 LBS | HEIGHT: 58 IN | RESPIRATION RATE: 14 BRPM | DIASTOLIC BLOOD PRESSURE: 65 MMHG | OXYGEN SATURATION: 97 % | HEART RATE: 98 BPM | TEMPERATURE: 97.8 F

## 2025-06-12 DIAGNOSIS — M16.12 PRIMARY OSTEOARTHRITIS OF LEFT HIP: ICD-10-CM

## 2025-06-12 DIAGNOSIS — Z01.818 PREOP GENERAL PHYSICAL EXAM: Primary | ICD-10-CM

## 2025-06-12 DIAGNOSIS — Z11.59 NEED FOR HEPATITIS C SCREENING TEST: ICD-10-CM

## 2025-06-12 DIAGNOSIS — Z23 NEED FOR VACCINATION: ICD-10-CM

## 2025-06-12 LAB
HGB BLD-MCNC: 13.5 G/DL (ref 11.7–15.7)
MCV RBC AUTO: 96 FL (ref 78–100)

## 2025-06-12 ASSESSMENT — PAIN SCALES - GENERAL: PAINLEVEL_OUTOF10: SEVERE PAIN (7)

## 2025-06-12 NOTE — NURSING NOTE
Patient tolerated Covid injection without incident. Injection site free from redness, swelling, and burning. Patient discharged in the care of lab.

## 2025-06-12 NOTE — PATIENT INSTRUCTIONS
How to Take Your Medication Before Surgery  Preoperative Medication Instructions   Antiplatelet or Anticoagulation Medication Instructions   - We reviewed the medication list and the patient is not on an antiplatelet or anticoagulation medications.    Additional Medication Instructions  Take all scheduled medications on the day of surgery EXCEPT for modifications listed below:     No ibuprofen in the three days prior to surgery  Patient Education   Preparing for Your Surgery  For Adults  Getting started  In most cases, a nurse will call to review your health history and instructions. They will give you an arrival time based on your scheduled surgery time. Please be ready to share:  Your doctor's clinic name and phone number  Your medical, surgical, and anesthesia history  A list of allergies and sensitivities  A list of medicines, including herbal treatments and over-the-counter drugs  Whether the patient has a legal guardian (ask how to send us the papers in advance)  Note: You may not receive a call if you were seen at our PAC (Preoperative Assessment Center).  Please tell us if you're pregnant--or if there's any chance you might be pregnant. Some surgeries may injure a fetus (unborn baby), so they require a pregnancy test. Surgeries that are safe for a fetus don't always need a test, and you can choose whether to have one.   Preparing for surgery  Within 10 to 30 days of surgery: Have a pre-op exam (sometimes called an H&P, or History and Physical). This can be done at a clinic or pre-operative center.  If you're having a , you may not need this exam. Talk to your care team.  At your pre-op exam, talk to your care team about all medicines you take. (This includes CBD oil and any drugs, such as THC, marijuana, and other forms of cannabis.) If you need to stop any medicine before surgery, ask when to start taking it again.  This is for your safety. Many medicines and drugs can make you bleed too much  during surgery. Some change how well surgery (anesthesia) drugs work.  Call your insurance company to let them know you're having surgery. (If you don't have insurance, call [phone].)  Call your clinic if there's any change in your health. This includes a scrape or scratch near the surgery site, or any signs of a cold (sore throat, runny nose, cough, rash, fever).  Eating and drinking guidelines  For your safety: Unless your surgeon tells you otherwise, follow the guidelines below.  Eat and drink as normal until 8 hours before you arrive for surgery. After that, no food or milk. You can spit out gum when you arrive.  Drink clear liquids until 2 hours before you arrive. These are liquids you can see through, like water, Gatorade, and Propel Water. They also include plain black coffee and tea (no cream or milk).  No alcohol for 24 hours before you arrive. The night before surgery, stop any drinks that contain THC.  If your care team tells you to take medicine on the morning of surgery, it's okay to take it with a sip of water. No other medicines or drugs are allowed (including CBD oil)--follow your care team's instructions.  If you have questions the day of surgery, call your hospital or surgery center.   Preventing infection  Shower or bathe the night before and the morning of surgery. Follow the instructions your clinic gave you. (If no instructions, use regular soap.)  Don't shave or clip hair near your surgery site. We'll remove the hair if needed.  Don't smoke or vape the morning of surgery. No chewing tobacco for 6 hours before you arrive. A nicotine patch is okay. You may spit out nicotine gum when you arrive.  For some surgeries, the surgeon will tell you to fully quit smoking and nicotine.  We will make every effort to keep you safe from infection. We will:  Clean our hands often with soap and water (or an alcohol-based hand rub).  Clean the skin at your surgery site with a special soap that kills  germs.  Give you a special gown to keep you warm. (Cold raises the risk of infection.)  Wear hair covers, masks, gowns, and gloves during surgery.  Give antibiotic medicine, if prescribed. Not all surgeries need this medicine.  What to bring on the day of surgery  Photo ID and insurance card  Copy of your health care directive, if you have one  Glasses and hearing aids (bring cases)  You can't wear contacts during surgery  Inhaler and eye drops, if you use them (tell us about these when you arrive)  CPAP machine or breathing device, if you use them  A few personal items, if spending the night  If you have . . .  A pacemaker, ICD (cardiac defibrillator), or other implant: Bring the ID card.  An implanted stimulator: Bring the remote control.  A legal guardian: Bring a copy of the certified (court-stamped) guardianship papers.  Please remove any jewelry, including body piercings. Leave jewelry and other valuables at home.  If you're going home the day of surgery  You must have a support person drive you home. They should stay with you overnight, and they may need to help with your self-care.  If you don't have a support person, please tells us as soon as possible. We can help.  After surgery  If it's hard to control your pain or you need more pain medicine, please call your surgeon's office.  Questions?   If you have any questions for your care team, list them here:   ____________________________________________________________________________________________________________________________________________________________________________________________________________________________________________________________  For informational purposes only. Not to replace the advice of your health care provider. Copyright   2003, 2019 [facility]. All rights reserved. Clinically reviewed by Ayush Miles MD. [employer] 219639 - REV 02/25.    --    Advanced directives are a document that lets us know who talks  "for you and what your desires are in a medical situation where you are unable to talk for yourself    Here is a useful web site that includes a link to the honoring choices form (under how do I document my choices?):    [email or web address]/[email or web address]/[email or web address]-choices    The three must haves for this form are  1) who speaks for you (health care agent if you cannot speak for yourself, and what 'nicole' they have  2) what interventions you want if yo are permanently unconscious  3) making this legal - either by notary, or by two signatures of witnesses, neither of who is your health care agent    There are other parts to the form that are optional    No rush for this! Just something we are supposed to discuss every so often.    If you complete the form, and can make a pdf of it, you can send me a message with that pdf attached and it will become part of your chart here; if you cannot make an electronic copy to send by [employer], mailing the document is also fine.  You might also consider putting it on your fridge under \"Emergency Instructions.,\" as well as having a copy for yourself and your family.    Please let me know if you have any questions.    Natalya Holloway MD      "

## 2025-06-12 NOTE — PROGRESS NOTES
Preoperative Evaluation  [facility]  [address] [place] [address]  Phone: [phone]  Fax: [phone]  Primary Provider: Jessie Jeffersno PA-C  Pre-op Performing Provider: Natalya Holloway MD  Jun 12, 2025 6/5/2025   Surgical Information   What procedure is being done? Hip Replacement left   Facility or Hospital where procedure/surgery will be performed: [facility]   Who is doing the procedure / surgery? Dr. Rene   Date of surgery / procedure: June 18th 2025   Time of surgery / procedure: 8:00 am   Where do you plan to recover after surgery? at home with family     Fax number for surgical facility: Note does not need to be faxed, will be available electronically in Epic.    Assessment & Plan     The proposed surgical procedure is considered INTERMEDIATE risk.    Preop general physical exam  - Hemoglobin  Hemoglobin   Date Value Ref Range Status   06/12/2025 13.5 11.7 - 15.7 g/dL Final   04/05/2019 8.0 (L) 11.7 - 15.7 g/dL Final   ]     Primary osteoarthritis of left hip  Reason for surgery    Need for hepatitis C screening test  Agreeable to this test as we have already taking  - Hepatitis C Screen reflex  to HCV RNA Quant and Genotype    Need for vaccination  discussed get at pharmacy:  - zoster vaccine recombinant adjuvanted (SHINGRIX) injection  Dispense: 0.5 mL; Refill: 0  - Tdap, tetanus-diptheria-acell pertussis, (BOOSTRIX) 5-2.5-18.5 LF-MCG/0.5 TALI injection  Dispense: 0.5 mL; Refill: 0  - RSV vaccine, bivalent, ABRYSVO, injection  Dispense: 0.5 mL; Refill: 0      Preoperative Medication Instructions  Antiplatelet or Anticoagulation Medication Instructions   - We reviewed the medication list and the patient is not on an antiplatelet or anticoagulation medications.    Additional Medication Instructions  Take all scheduled medications on the day of surgery EXCEPT for modifications listed below:  Do not take ibuprofen  "within 3 days of surgery    Recommendation  Approval given to proceed with proposed procedure, without further diagnostic evaluation.      Vishnu Fallon is a 75 year old, presenting for the following:  Pre-Op Exam (DOS 6/18 at [facility] for Arthroplasty, hip, total direct anterior approach, left by Mark Rene MD)          6/12/2025     2:39 PM   Additional Questions   Roomed by blanquita vance     HPI: \"Leeanne's hip is been bothering her since January or February.  It is getting to the point where it is affecting her normal functioning         6/5/2025   Pre-Op Questionnaire   Have you ever had a heart attack or stroke? No   Have you ever had surgery on your heart or blood vessels, such as a stent placement, a coronary artery bypass, or surgery on an artery in your head, neck, heart, or legs? No   Do you have chest pain with activity? No   Do you have a history of heart failure? No   Do you currently have a cold, bronchitis or symptoms of other infection? No   Do you have a cough, shortness of breath, or wheezing? No   Do you or anyone in your family have previous history of blood clots? No   Do you or does anyone in your family have a serious bleeding problem such as prolonged bleeding following surgeries or cuts? No   Have you ever had problems with anemia or been told to take iron pills? No   Have you had any abnormal blood loss such as black, tarry or bloody stools, or abnormal vaginal bleeding? No   Have you ever had a blood transfusion? No   Are you willing to have a blood transfusion if it is medically needed before, during, or after your surgery? Yes   Have you or any of your relatives ever had problems with anesthesia? No   Do you have sleep apnea, excessive snoring or daytime drowsiness? No   Do you have any artifical heart valves or other implanted medical devices like a pacemaker, defibrillator, or continuous glucose monitor? No   Do you have artificial joints? No   Are you allergic to latex? No " "    Advance Care Planning  AVS has link to Honoring Choices.    Preoperative Review of    reviewed - controlled substances reflected in medication list.      Status of Chronic Conditions:  See problem list for active medical problems.  Problems all longstanding and stable, except as noted/documented.  See ROS for pertinent symptoms related to these conditions.    Note that inflammatory immune disorder appears on her list.  She says she was diagnosed with lupus in the past, but is not taking any medication.  Main issues are joint aches, dry, dry eyes and sun sensitivity    Patient Active Problem List    Diagnosis Date Noted    Status post lumbar spine surgery for decompression of spinal cord 04/03/2019     Priority: Medium    Degenerative scoliosis in adult patient 02/05/2019     Priority: Medium    Anterolisthesis 02/05/2019     Priority: Medium    Fracture of eleventh thoracic vertebra (H) 06/27/2017     Priority: Medium    Major depression, recurrent 05/03/2017     Priority: Medium    Tubular adenoma of colon 05/03/2017     Priority: Medium     Overview:   \"Advanced adenoma\" biopsy from colonoscopy 10/10      Osteoporosis 06/30/2016     Priority: Medium    Inflammatory autoimmune disorder 03/21/2016     Priority: Medium     Diagnosed with Lupus in 1994 by a dermatologist at the [facility]    Overview:   Overview:   Diagnosed in 1994 by a dermatologist at the [facility]    Sjogren's too?    Current symptoms  - fatigue, dry moth, dry eyes, joint pain, sun sensitivity  Not on medications      Raynaud's disease 03/21/2016     Priority: Medium    Chronic bilateral low back pain without sciatica 03/21/2016     Priority: Medium     Having EMG by neurology 2016.      Primary insomnia 03/21/2016     Priority: Medium    Generalized anxiety disorder 01/12/2010     Priority: Medium      Past Medical History:   Diagnosis Date    Anxiety     Depression     Hyponatremia 05/16/2017    Osteoporosis     Vitamin D deficiency " 03/16/2015    Wedge compression fracture of unspecified thoracic vertebra, sequela 06/30/2016     Past Surgical History:   Procedure Laterality Date    CATARACT EXTRACTION, BILATERAL  01/01/2014    CATARACT IOL, RT/LT  01/01/2014    HARVEST BONE MARROW FROM ILIAC CREST Right 04/03/2019    Procedure: Lake Bone Marrow From Iliac Crest;  Surgeon: Lane Ventura MD;  Location: [facility] OR    KIDNEY STONE SURGERY  2010    OPTICAL TRACKING SYSTEM FUSION SPINE POSTERIOR LUMBAR THREE+ LEVELS N/A 04/03/2019    Procedure: Lumbar 3-4 Decompression, Lumbar 4-Sacral 1 Transforaminal Lumbar Interbody Fusion, Lumbar 5-Sacral 1 Kaminski Maxwell Osteotomy, Lumbar 4-Pelvis Instrumentation, Illiac Crest Autograft;  Surgeon: Lane Ventura MD;  Location: [facility] OR    OTHER SURGICAL HISTORY  2015    HAMSTRING SURGERY     Current Outpatient Medications   Medication Sig Dispense Refill    acetaminophen (TYLENOL) 500 MG tablet Take 500-1,000 mg by mouth every 6 hours as needed for mild pain.      calcium citrate (CITRACAL) 950 (200 Ca) MG tablet       DULoxetine (CYMBALTA) 60 MG capsule       ibuprofen (ADVIL/MOTRIN) 400 MG tablet Take 400 mg by mouth every 6 hours as needed for moderate pain.      LORazepam (ATIVAN) 0.5 MG tablet Take 0.75 mg by mouth daily.      RSV vaccine, bivalent, ABRYSVO, injection Inject 0.5 mLs into the muscle once for 1 dose. Pharmacist administered 0.5 mL 0    Tdap, tetanus-diptheria-acell pertussis, (BOOSTRIX) 5-2.5-18.5 LF-MCG/0.5 TALI injection Inject 0.5 mLs into the muscle once for 1 dose. 0.5 mL 0    zoster vaccine recombinant adjuvanted (SHINGRIX) injection Inject 0.5 mLs into the muscle once for 1 dose. Pharmacist administered 0.5 mL 0    denosumab (PROLIA) 60 MG/ML SOSY injection Inject 60 mg Subcutaneous         Allergies   Allergen Reactions    Chocolate Hives    Orange Other (See Comments)     Mouth sores    Gabapentin      Confusion          Social History     Tobacco Use    Smoking  "status: Never     Passive exposure: Never    Smokeless tobacco: Never   Substance Use Topics    Alcohol use: No     Comment: 7 per week       History   Drug Use No           Constitutional: negative for recent illness or change in weight  Eyes: negative for irritation and vision change  Ears, nose, mouth, throat, and face: negative for nasal congestion and sore throat  Respiratory: negative for cough and dyspnea on exertion  Cardiovascular: negative for chest pain and palpitations  Gastrointestinal: negative for abdominal pain and change in bowel habits  Genitourinary:negative for dysuria, frequency and hesitancy  Integument/breast: negative for rash  Hematologic/lymphatic: negative for bleeding and easy bruising  Neurological: negative for dizziness, headaches and paresthesia     Objective    /65 (BP Location: Left arm, Patient Position: Sitting, Cuff Size: Adult Small)   Pulse 98   Temp 97.8  F (36.6  C)   Resp 14   Ht 1.473 m (4' 10\")   Wt 44.2 kg (97 lb 8 oz)   LMP  (LMP Unknown)   SpO2 97%   BMI 20.38 kg/m     Estimated body mass index is 20.38 kg/m  as calculated from the following:    Height as of this encounter: 1.473 m (4' 10\").    Weight as of this encounter: 44.2 kg (97 lb 8 oz).  Physical Exam  General appearance - alert, well appearing, and in no distress  Mental status - normal mood, behavior, speech, dress, motor activity, and thought processes  Eyes - pupils equal and reactive, extraocular eye movements intact, funduscopic exam normal, discs flat and sharp  Ears - bilateral TM's and external ear canals normal  Mouth - mucous membranes moist, pharynx normal without lesions  Neck - supple, no significant adenopathy, carotids upstroke normal bilaterally, no bruits, thyroid exam: thyroid is normal in size without nodules or tenderness  Chest - clear to auscultation, no wheezes, rales or rhonchi, symmetric air entry  Heart - normal rate, regular rhythm, normal S1, S2, no murmurs, rubs, " clicks or gallops  Abdomen - soft, nontender, nondistended, no masses or organomegaly  Neurological - alert, oriented, normal speech, no focal findings or movement disorder noted, DTR's normal and symmetric  Extremities - peripheral pulses normal, no pedal edema, no clubbing or cyanosis  Skin - no rashes or worrisome lesions      Recent Labs   Lab Test 12/27/24  1507      POTASSIUM 4.1   CR 0.97*        Diagnostics  Recent Results (from the past 24 hours)   Hemoglobin    Collection Time: 06/12/25  3:58 PM   Result Value Ref Range    Hemoglobin 13.5 11.7 - 15.7 g/dL    MCV 96 78 - 100 fL      No EKG required, no history of coronary heart disease, significant arrhythmia, peripheral arterial disease or other structural heart disease.    Revised Cardiac Risk Index (RCRI)  The patient has the following serious cardiovascular risks for perioperative complications:   - No serious cardiac risks = 0 points     RCRI Interpretation: 0 points: Class I (very low risk - 0.4% complication rate)         Signed Electronically by: Natalya Holloway MD  A copy of this evaluation report is provided to the requesting physician.

## 2025-06-13 ENCOUNTER — RESULTS FOLLOW-UP (OUTPATIENT)
Dept: INTERNAL MEDICINE | Facility: CLINIC | Age: 75
End: 2025-06-13

## 2025-06-16 NOTE — PHARMACY-ADMISSION MEDICATION HISTORY
Medication history and patient interview completed by pharmacy intern/student or pre-admitting RN.  Reviewed by pharmacist, including [facility] dispense records, [facility] Everywhere, and chart review.       Katja Steele, Pharm.D.    Med rec completed by:      Prior to Admission medications    Medication Sig Last Dose Taking? Auth Provider Long Term End Date   acetaminophen (TYLENOL) 500 MG tablet Take 500-1,000 mg by mouth every 6 hours as needed for mild pain.  Yes Reported, Patient     calcium citrate (CITRACAL) 950 (200 Ca) MG tablet Take 1 tablet by mouth daily.  Yes Reported, Patient     denosumab (PROLIA) 60 MG/ML SOSY injection Inject 60 mg Subcutaneous  Yes Reported, Patient Yes 6/9/25   DULoxetine (CYMBALTA) 60 MG capsule Take 60 mg by mouth daily.  Yes Reported, Patient Yes    ibuprofen (ADVIL/MOTRIN) 400 MG tablet Take 400 mg by mouth every 6 hours as needed for moderate pain.  Yes Reported, Patient No    LORazepam (ATIVAN) 0.5 MG tablet Take 0.75 mg by mouth daily.  Yes Reported, Patient No

## 2025-06-18 ENCOUNTER — ANESTHESIA EVENT (OUTPATIENT)
Dept: SURGERY | Facility: CLINIC | Age: 75
End: 2025-06-18
Payer: COMMERCIAL

## 2025-06-18 ENCOUNTER — HOSPITAL ENCOUNTER (OUTPATIENT)
Facility: CLINIC | Age: 75
Discharge: HOME | End: 2025-06-18
Attending: ORTHOPAEDIC SURGERY | Admitting: ORTHOPAEDIC SURGERY
Payer: COMMERCIAL

## 2025-06-18 ENCOUNTER — APPOINTMENT (OUTPATIENT)
Dept: PHYSICAL THERAPY | Facility: CLINIC | Age: 75
End: 2025-06-18
Attending: ORTHOPAEDIC SURGERY
Payer: COMMERCIAL

## 2025-06-18 ENCOUNTER — ANESTHESIA (OUTPATIENT)
Dept: SURGERY | Facility: CLINIC | Age: 75
End: 2025-06-18
Payer: COMMERCIAL

## 2025-06-18 ENCOUNTER — APPOINTMENT (OUTPATIENT)
Dept: GENERAL RADIOLOGY | Facility: CLINIC | Age: 75
End: 2025-06-18
Attending: PHYSICIAN ASSISTANT
Payer: COMMERCIAL

## 2025-06-18 ENCOUNTER — APPOINTMENT (OUTPATIENT)
Dept: GENERAL RADIOLOGY | Facility: CLINIC | Age: 75
End: 2025-06-18
Attending: ORTHOPAEDIC SURGERY
Payer: COMMERCIAL

## 2025-06-18 VITALS
SYSTOLIC BLOOD PRESSURE: 105 MMHG | TEMPERATURE: 97 F | OXYGEN SATURATION: 96 % | RESPIRATION RATE: 16 BRPM | BODY MASS INDEX: 19.56 KG/M2 | DIASTOLIC BLOOD PRESSURE: 66 MMHG | HEART RATE: 80 BPM | WEIGHT: 93.6 LBS

## 2025-06-18 DIAGNOSIS — M16.12 PRIMARY OSTEOARTHRITIS OF LEFT HIP: Primary | ICD-10-CM

## 2025-06-18 LAB
ABO + RH BLD: NORMAL
BLD GP AB SCN SERPL QL: NEGATIVE
ERYTHROCYTE [DISTWIDTH] IN BLOOD BY AUTOMATED COUNT: 13.2 % (ref 10–15)
HCT VFR BLD AUTO: 40.5 % (ref 35–47)
HGB BLD-MCNC: 14 G/DL (ref 11.7–15.7)
MCH RBC QN AUTO: 32.2 PG (ref 26.5–33)
MCHC RBC AUTO-ENTMCNC: 34.6 G/DL (ref 31.5–36.5)
MCV RBC AUTO: 93 FL (ref 78–100)
PLATELET # BLD AUTO: 214 10E3/UL (ref 150–450)
RBC # BLD AUTO: 4.35 10E6/UL (ref 3.8–5.2)
SPECIMEN EXP DATE BLD: NORMAL
WBC # BLD AUTO: 4.3 10E3/UL (ref 4–11)

## 2025-06-18 PROCEDURE — 250N000011 HC RX IP 250 OP 636: Performed by: NURSE ANESTHETIST, CERTIFIED REGISTERED

## 2025-06-18 PROCEDURE — 27130 TOTAL HIP ARTHROPLASTY: CPT | Mod: LT | Performed by: ORTHOPAEDIC SURGERY

## 2025-06-18 PROCEDURE — 258N000003 HC RX IP 258 OP 636: Performed by: ANESTHESIOLOGY

## 2025-06-18 PROCEDURE — C1776 JOINT DEVICE (IMPLANTABLE): HCPCS | Performed by: ORTHOPAEDIC SURGERY

## 2025-06-18 PROCEDURE — 710N000009 HC RECOVERY PHASE 1, LEVEL 1, PER MIN: Performed by: ORTHOPAEDIC SURGERY

## 2025-06-18 PROCEDURE — 97116 GAIT TRAINING THERAPY: CPT | Mod: GP | Performed by: PHYSICAL THERAPIST

## 2025-06-18 PROCEDURE — 999N000141 HC STATISTIC PRE-PROCEDURE NURSING ASSESSMENT: Performed by: ORTHOPAEDIC SURGERY

## 2025-06-18 PROCEDURE — 250N000013 HC RX MED GY IP 250 OP 250 PS 637: Performed by: PHYSICIAN ASSISTANT

## 2025-06-18 PROCEDURE — 360N000084 HC SURGERY LEVEL 4 W/ FLUORO, PER MIN: Performed by: ORTHOPAEDIC SURGERY

## 2025-06-18 PROCEDURE — 710N000012 HC RECOVERY PHASE 2, PER MINUTE: Performed by: ORTHOPAEDIC SURGERY

## 2025-06-18 PROCEDURE — 258N000003 HC RX IP 258 OP 636: Performed by: NURSE ANESTHETIST, CERTIFIED REGISTERED

## 2025-06-18 PROCEDURE — 86900 BLOOD TYPING SEROLOGIC ABO: CPT | Performed by: ORTHOPAEDIC SURGERY

## 2025-06-18 PROCEDURE — 258N000003 HC RX IP 258 OP 636: Performed by: ORTHOPAEDIC SURGERY

## 2025-06-18 PROCEDURE — 97530 THERAPEUTIC ACTIVITIES: CPT | Mod: GP | Performed by: PHYSICAL THERAPIST

## 2025-06-18 PROCEDURE — 250N000013 HC RX MED GY IP 250 OP 250 PS 637: Performed by: ORTHOPAEDIC SURGERY

## 2025-06-18 PROCEDURE — 250N000009 HC RX 250: Performed by: NURSE ANESTHETIST, CERTIFIED REGISTERED

## 2025-06-18 PROCEDURE — C1713 ANCHOR/SCREW BN/BN,TIS/BN: HCPCS | Performed by: ORTHOPAEDIC SURGERY

## 2025-06-18 PROCEDURE — 258N000001 HC RX 258: Performed by: ORTHOPAEDIC SURGERY

## 2025-06-18 PROCEDURE — 250N000011 HC RX IP 250 OP 636: Performed by: ORTHOPAEDIC SURGERY

## 2025-06-18 PROCEDURE — 999N000179 XR SURGERY CARM FLUORO LESS THAN 5 MIN W STILLS

## 2025-06-18 PROCEDURE — 85018 HEMOGLOBIN: CPT | Performed by: ORTHOPAEDIC SURGERY

## 2025-06-18 PROCEDURE — 272N000001 HC OR GENERAL SUPPLY STERILE: Performed by: ORTHOPAEDIC SURGERY

## 2025-06-18 PROCEDURE — 97110 THERAPEUTIC EXERCISES: CPT | Mod: GP | Performed by: PHYSICAL THERAPIST

## 2025-06-18 PROCEDURE — 999N000065 XR PELVIS AND HIP PORTABLE LEFT 1 VIEW

## 2025-06-18 PROCEDURE — 250N000025 HC SEVOFLURANE, PER MIN: Performed by: ORTHOPAEDIC SURGERY

## 2025-06-18 PROCEDURE — 36415 COLL VENOUS BLD VENIPUNCTURE: CPT | Performed by: ORTHOPAEDIC SURGERY

## 2025-06-18 PROCEDURE — 370N000017 HC ANESTHESIA TECHNICAL FEE, PER MIN: Performed by: ORTHOPAEDIC SURGERY

## 2025-06-18 PROCEDURE — 97161 PT EVAL LOW COMPLEX 20 MIN: CPT | Mod: GP | Performed by: PHYSICAL THERAPIST

## 2025-06-18 PROCEDURE — 250N000013 HC RX MED GY IP 250 OP 250 PS 637: Performed by: ANESTHESIOLOGY

## 2025-06-18 DEVICE — IMP SHELL SNR ACET R3 3H 52MM 71335552: Type: IMPLANTABLE DEVICE | Site: HIP | Status: FUNCTIONAL

## 2025-06-18 DEVICE — IMPLANTABLE DEVICE: Type: IMPLANTABLE DEVICE | Site: HIP | Status: FUNCTIONAL

## 2025-06-18 DEVICE — IMP SCR ACET SNN SPHERICAL HEAD 6.5X15MM 71332515: Type: IMPLANTABLE DEVICE | Site: HIP | Status: FUNCTIONAL

## 2025-06-18 DEVICE — IMP SCR ACET SNN SPHERICAL HEAD 6.5X25MM 71332525: Type: IMPLANTABLE DEVICE | Site: HIP | Status: FUNCTIONAL

## 2025-06-18 RX ORDER — TRANEXAMIC ACID 650 MG/1
1950 TABLET ORAL ONCE
Status: COMPLETED | OUTPATIENT
Start: 2025-06-18 | End: 2025-06-18

## 2025-06-18 RX ORDER — NALOXONE HYDROCHLORIDE 0.4 MG/ML
0.4 INJECTION, SOLUTION INTRAMUSCULAR; INTRAVENOUS; SUBCUTANEOUS
Status: DISCONTINUED | OUTPATIENT
Start: 2025-06-18 | End: 2025-06-18 | Stop reason: HOSPADM

## 2025-06-18 RX ORDER — FAMOTIDINE 20 MG/1
20 TABLET, FILM COATED ORAL 2 TIMES DAILY
Status: CANCELLED | OUTPATIENT
Start: 2025-06-18

## 2025-06-18 RX ORDER — MEPERIDINE HYDROCHLORIDE 25 MG/ML
12.5 INJECTION INTRAMUSCULAR; INTRAVENOUS; SUBCUTANEOUS EVERY 5 MIN PRN
Status: DISCONTINUED | OUTPATIENT
Start: 2025-06-18 | End: 2025-06-18 | Stop reason: HOSPADM

## 2025-06-18 RX ORDER — CELECOXIB 100 MG/1
100 CAPSULE ORAL 2 TIMES DAILY
Qty: 28 CAPSULE | Refills: 0 | Status: SHIPPED | OUTPATIENT
Start: 2025-06-18 | End: 2025-07-02

## 2025-06-18 RX ORDER — LIDOCAINE HYDROCHLORIDE 20 MG/ML
INJECTION, SOLUTION INFILTRATION; PERINEURAL PRN
Status: DISCONTINUED | OUTPATIENT
Start: 2025-06-18 | End: 2025-06-18

## 2025-06-18 RX ORDER — ONDANSETRON 4 MG/1
4 TABLET, ORALLY DISINTEGRATING ORAL EVERY 30 MIN PRN
Status: DISCONTINUED | OUTPATIENT
Start: 2025-06-18 | End: 2025-06-18 | Stop reason: HOSPADM

## 2025-06-18 RX ORDER — DEXAMETHASONE SODIUM PHOSPHATE 4 MG/ML
4 INJECTION, SOLUTION INTRA-ARTICULAR; INTRALESIONAL; INTRAMUSCULAR; INTRAVENOUS; SOFT TISSUE
Status: DISCONTINUED | OUTPATIENT
Start: 2025-06-18 | End: 2025-06-18 | Stop reason: HOSPADM

## 2025-06-18 RX ORDER — FENTANYL CITRATE 50 UG/ML
INJECTION, SOLUTION INTRAMUSCULAR; INTRAVENOUS PRN
Status: DISCONTINUED | OUTPATIENT
Start: 2025-06-18 | End: 2025-06-18

## 2025-06-18 RX ORDER — HYDROXYZINE HYDROCHLORIDE 10 MG/1
10 TABLET, FILM COATED ORAL EVERY 6 HOURS PRN
Status: CANCELLED | OUTPATIENT
Start: 2025-06-18

## 2025-06-18 RX ORDER — ONDANSETRON 2 MG/ML
4 INJECTION INTRAMUSCULAR; INTRAVENOUS EVERY 6 HOURS PRN
Status: CANCELLED | OUTPATIENT
Start: 2025-06-18

## 2025-06-18 RX ORDER — ONDANSETRON 4 MG/1
4 TABLET, ORALLY DISINTEGRATING ORAL EVERY 8 HOURS PRN
Qty: 10 TABLET | Refills: 0 | Status: SHIPPED | OUTPATIENT
Start: 2025-06-18

## 2025-06-18 RX ORDER — ASPIRIN 81 MG/1
81 TABLET ORAL 2 TIMES DAILY
Status: CANCELLED | OUTPATIENT
Start: 2025-06-18

## 2025-06-18 RX ORDER — AMOXICILLIN 250 MG
1-2 CAPSULE ORAL 2 TIMES DAILY
Qty: 30 TABLET | Refills: 0 | Status: SHIPPED | OUTPATIENT
Start: 2025-06-18

## 2025-06-18 RX ORDER — BISACODYL 10 MG
10 SUPPOSITORY, RECTAL RECTAL DAILY PRN
Status: CANCELLED | OUTPATIENT
Start: 2025-06-18

## 2025-06-18 RX ORDER — SODIUM CHLORIDE, SODIUM LACTATE, POTASSIUM CHLORIDE, CALCIUM CHLORIDE 600; 310; 30; 20 MG/100ML; MG/100ML; MG/100ML; MG/100ML
INJECTION, SOLUTION INTRAVENOUS CONTINUOUS
Status: DISCONTINUED | OUTPATIENT
Start: 2025-06-18 | End: 2025-06-18 | Stop reason: HOSPADM

## 2025-06-18 RX ORDER — CEFAZOLIN SODIUM/WATER 2 G/20 ML
2 SYRINGE (ML) INTRAVENOUS
Status: COMPLETED | OUTPATIENT
Start: 2025-06-18 | End: 2025-06-18

## 2025-06-18 RX ORDER — NALOXONE HYDROCHLORIDE 0.4 MG/ML
0.2 INJECTION, SOLUTION INTRAMUSCULAR; INTRAVENOUS; SUBCUTANEOUS
Status: DISCONTINUED | OUTPATIENT
Start: 2025-06-18 | End: 2025-06-18 | Stop reason: HOSPADM

## 2025-06-18 RX ORDER — PROPOFOL 10 MG/ML
INJECTION, EMULSION INTRAVENOUS PRN
Status: DISCONTINUED | OUTPATIENT
Start: 2025-06-18 | End: 2025-06-18

## 2025-06-18 RX ORDER — SODIUM CHLORIDE, SODIUM LACTATE, POTASSIUM CHLORIDE, CALCIUM CHLORIDE 600; 310; 30; 20 MG/100ML; MG/100ML; MG/100ML; MG/100ML
INJECTION, SOLUTION INTRAVENOUS CONTINUOUS
Status: CANCELLED | OUTPATIENT
Start: 2025-06-18

## 2025-06-18 RX ORDER — AMOXICILLIN 250 MG
1 CAPSULE ORAL 2 TIMES DAILY
Status: CANCELLED | OUTPATIENT
Start: 2025-06-18

## 2025-06-18 RX ORDER — ALBUTEROL SULFATE 0.83 MG/ML
2.5 SOLUTION RESPIRATORY (INHALATION) EVERY 4 HOURS PRN
Status: DISCONTINUED | OUTPATIENT
Start: 2025-06-18 | End: 2025-06-18 | Stop reason: HOSPADM

## 2025-06-18 RX ORDER — POLYETHYLENE GLYCOL 3350 17 G/17G
17 POWDER, FOR SOLUTION ORAL DAILY
Status: CANCELLED | OUTPATIENT
Start: 2025-06-19

## 2025-06-18 RX ORDER — FENTANYL CITRATE 50 UG/ML
50 INJECTION, SOLUTION INTRAMUSCULAR; INTRAVENOUS EVERY 5 MIN PRN
Status: DISCONTINUED | OUTPATIENT
Start: 2025-06-18 | End: 2025-06-18 | Stop reason: HOSPADM

## 2025-06-18 RX ORDER — CEFAZOLIN SODIUM/WATER 2 G/20 ML
2 SYRINGE (ML) INTRAVENOUS EVERY 6 HOURS
Status: DISCONTINUED | OUTPATIENT
Start: 2025-06-18 | End: 2025-06-18 | Stop reason: HOSPADM

## 2025-06-18 RX ORDER — ONDANSETRON 2 MG/ML
4 INJECTION INTRAMUSCULAR; INTRAVENOUS EVERY 30 MIN PRN
Status: DISCONTINUED | OUTPATIENT
Start: 2025-06-18 | End: 2025-06-18 | Stop reason: HOSPADM

## 2025-06-18 RX ORDER — CEFAZOLIN SODIUM/WATER 2 G/20 ML
2 SYRINGE (ML) INTRAVENOUS EVERY 8 HOURS
Status: DISCONTINUED | OUTPATIENT
Start: 2025-06-18 | End: 2025-06-18

## 2025-06-18 RX ORDER — ASPIRIN 81 MG/1
81 TABLET ORAL 2 TIMES DAILY
Qty: 60 TABLET | Refills: 0 | Status: SHIPPED | OUTPATIENT
Start: 2025-06-18

## 2025-06-18 RX ORDER — ONDANSETRON 4 MG/1
4 TABLET, ORALLY DISINTEGRATING ORAL EVERY 6 HOURS PRN
Status: CANCELLED | OUTPATIENT
Start: 2025-06-18

## 2025-06-18 RX ORDER — HYDROMORPHONE HCL IN WATER/PF 6 MG/30 ML
0.4 PATIENT CONTROLLED ANALGESIA SYRINGE INTRAVENOUS EVERY 5 MIN PRN
Status: DISCONTINUED | OUTPATIENT
Start: 2025-06-18 | End: 2025-06-18 | Stop reason: HOSPADM

## 2025-06-18 RX ORDER — LIDOCAINE 40 MG/G
CREAM TOPICAL
Status: DISCONTINUED | OUTPATIENT
Start: 2025-06-18 | End: 2025-06-18 | Stop reason: HOSPADM

## 2025-06-18 RX ORDER — LIDOCAINE 40 MG/G
CREAM TOPICAL
Status: CANCELLED | OUTPATIENT
Start: 2025-06-18

## 2025-06-18 RX ORDER — CEFAZOLIN SODIUM/WATER 2 G/20 ML
2 SYRINGE (ML) INTRAVENOUS SEE ADMIN INSTRUCTIONS
Status: DISCONTINUED | OUTPATIENT
Start: 2025-06-18 | End: 2025-06-18 | Stop reason: HOSPADM

## 2025-06-18 RX ORDER — CELECOXIB 100 MG/1
100 CAPSULE ORAL 2 TIMES DAILY
Status: CANCELLED | OUTPATIENT
Start: 2025-06-18 | End: 2025-06-20

## 2025-06-18 RX ORDER — HYDROMORPHONE HCL IN WATER/PF 6 MG/30 ML
0.1 PATIENT CONTROLLED ANALGESIA SYRINGE INTRAVENOUS EVERY 4 HOURS PRN
Refills: 0 | Status: CANCELLED | OUTPATIENT
Start: 2025-06-18

## 2025-06-18 RX ORDER — ACETAMINOPHEN 325 MG/1
650 TABLET ORAL EVERY 4 HOURS PRN
Qty: 100 TABLET | Refills: 0 | Status: SHIPPED | OUTPATIENT
Start: 2025-06-18

## 2025-06-18 RX ORDER — VANCOMYCIN HYDROCHLORIDE 1 G/20ML
INJECTION, POWDER, LYOPHILIZED, FOR SOLUTION INTRAVENOUS PRN
Status: DISCONTINUED | OUTPATIENT
Start: 2025-06-18 | End: 2025-06-18 | Stop reason: HOSPADM

## 2025-06-18 RX ORDER — HYDROMORPHONE HCL IN WATER/PF 6 MG/30 ML
0.2 PATIENT CONTROLLED ANALGESIA SYRINGE INTRAVENOUS EVERY 4 HOURS PRN
Refills: 0 | Status: CANCELLED | OUTPATIENT
Start: 2025-06-18

## 2025-06-18 RX ORDER — ACETAMINOPHEN 325 MG/1
975 TABLET ORAL EVERY 8 HOURS
Status: DISCONTINUED | OUTPATIENT
Start: 2025-06-18 | End: 2025-06-18 | Stop reason: HOSPADM

## 2025-06-18 RX ORDER — ONDANSETRON 2 MG/ML
INJECTION INTRAMUSCULAR; INTRAVENOUS PRN
Status: DISCONTINUED | OUTPATIENT
Start: 2025-06-18 | End: 2025-06-18

## 2025-06-18 RX ORDER — LABETALOL HYDROCHLORIDE 5 MG/ML
10 INJECTION, SOLUTION INTRAVENOUS EVERY 10 MIN PRN
Status: DISCONTINUED | OUTPATIENT
Start: 2025-06-18 | End: 2025-06-18 | Stop reason: HOSPADM

## 2025-06-18 RX ORDER — OXYCODONE HYDROCHLORIDE 5 MG/1
5 TABLET ORAL EVERY 4 HOURS PRN
Qty: 25 TABLET | Refills: 0 | Status: SHIPPED | OUTPATIENT
Start: 2025-06-18

## 2025-06-18 RX ORDER — GLYCINE 1.5 G/100ML
SOLUTION IRRIGATION PRN
Status: DISCONTINUED | OUTPATIENT
Start: 2025-06-18 | End: 2025-06-18 | Stop reason: HOSPADM

## 2025-06-18 RX ORDER — OXYCODONE HYDROCHLORIDE 5 MG/1
5 TABLET ORAL EVERY 4 HOURS PRN
Status: DISCONTINUED | OUTPATIENT
Start: 2025-06-18 | End: 2025-06-18 | Stop reason: HOSPADM

## 2025-06-18 RX ORDER — ACETAMINOPHEN 325 MG/1
975 TABLET ORAL ONCE
Status: COMPLETED | OUTPATIENT
Start: 2025-06-18 | End: 2025-06-18

## 2025-06-18 RX ORDER — METHOCARBAMOL 500 MG/1
500 TABLET, FILM COATED ORAL 3 TIMES DAILY
Status: CANCELLED | OUTPATIENT
Start: 2025-06-18

## 2025-06-18 RX ORDER — DEXAMETHASONE SODIUM PHOSPHATE 4 MG/ML
INJECTION, SOLUTION INTRA-ARTICULAR; INTRALESIONAL; INTRAMUSCULAR; INTRAVENOUS; SOFT TISSUE PRN
Status: DISCONTINUED | OUTPATIENT
Start: 2025-06-18 | End: 2025-06-18

## 2025-06-18 RX ORDER — POLYETHYLENE GLYCOL 3350 17 G/17G
1 POWDER, FOR SOLUTION ORAL DAILY
Qty: 7 PACKET | Refills: 0 | Status: SHIPPED | OUTPATIENT
Start: 2025-06-18

## 2025-06-18 RX ORDER — PROCHLORPERAZINE MALEATE 5 MG/1
5 TABLET ORAL EVERY 6 HOURS PRN
Status: CANCELLED | OUTPATIENT
Start: 2025-06-18

## 2025-06-18 RX ORDER — OMEPRAZOLE 20 MG/1
20 CAPSULE, DELAYED RELEASE ORAL DAILY
Qty: 30 CAPSULE | Refills: 0 | Status: SHIPPED | OUTPATIENT
Start: 2025-06-18

## 2025-06-18 RX ORDER — EPHEDRINE SULFATE 50 MG/ML
INJECTION, SOLUTION INTRAMUSCULAR; INTRAVENOUS; SUBCUTANEOUS PRN
Status: DISCONTINUED | OUTPATIENT
Start: 2025-06-18 | End: 2025-06-18

## 2025-06-18 RX ADMIN — PHENYLEPHRINE HYDROCHLORIDE 100 MCG: 10 INJECTION INTRAVENOUS at 08:07

## 2025-06-18 RX ADMIN — EPHEDRINE SULFATE 5 MG: 5 INJECTION INTRAVENOUS at 08:09

## 2025-06-18 RX ADMIN — FENTANYL CITRATE 25 MCG: 50 INJECTION INTRAMUSCULAR; INTRAVENOUS at 09:32

## 2025-06-18 RX ADMIN — EPHEDRINE SULFATE 5 MG: 5 INJECTION INTRAVENOUS at 08:05

## 2025-06-18 RX ADMIN — SODIUM CHLORIDE, SODIUM LACTATE, POTASSIUM CHLORIDE, AND CALCIUM CHLORIDE: .6; .31; .03; .02 INJECTION, SOLUTION INTRAVENOUS at 06:25

## 2025-06-18 RX ADMIN — PHENYLEPHRINE HYDROCHLORIDE 50 MCG: 10 INJECTION INTRAVENOUS at 09:49

## 2025-06-18 RX ADMIN — PHENYLEPHRINE HYDROCHLORIDE 50 MCG: 10 INJECTION INTRAVENOUS at 07:53

## 2025-06-18 RX ADMIN — PHENYLEPHRINE HYDROCHLORIDE 50 MCG: 10 INJECTION INTRAVENOUS at 09:51

## 2025-06-18 RX ADMIN — HYDROMORPHONE HYDROCHLORIDE 0.5 MG: 1 INJECTION, SOLUTION INTRAMUSCULAR; INTRAVENOUS; SUBCUTANEOUS at 08:13

## 2025-06-18 RX ADMIN — ACETAMINOPHEN 975 MG: 325 TABLET ORAL at 07:03

## 2025-06-18 RX ADMIN — PHENYLEPHRINE HYDROCHLORIDE 50 MCG: 10 INJECTION INTRAVENOUS at 09:44

## 2025-06-18 RX ADMIN — PROPOFOL 140 MG: 10 INJECTION, EMULSION INTRAVENOUS at 07:40

## 2025-06-18 RX ADMIN — FENTANYL CITRATE 25 MCG: 50 INJECTION INTRAMUSCULAR; INTRAVENOUS at 09:17

## 2025-06-18 RX ADMIN — TRANEXAMIC ACID 1950 MG: 650 TABLET ORAL at 06:23

## 2025-06-18 RX ADMIN — LIDOCAINE HYDROCHLORIDE 50 MG: 20 INJECTION, SOLUTION INFILTRATION; PERINEURAL at 07:40

## 2025-06-18 RX ADMIN — EPHEDRINE SULFATE 5 MG: 5 INJECTION INTRAVENOUS at 08:06

## 2025-06-18 RX ADMIN — PHENYLEPHRINE HYDROCHLORIDE 0.2 MCG/KG/MIN: 10 INJECTION INTRAVENOUS at 08:17

## 2025-06-18 RX ADMIN — SODIUM CHLORIDE, SODIUM LACTATE, POTASSIUM CHLORIDE, AND CALCIUM CHLORIDE: .6; .31; .03; .02 INJECTION, SOLUTION INTRAVENOUS at 09:25

## 2025-06-18 RX ADMIN — DEXAMETHASONE SODIUM PHOSPHATE 8 MG: 4 INJECTION, SOLUTION INTRA-ARTICULAR; INTRALESIONAL; INTRAMUSCULAR; INTRAVENOUS; SOFT TISSUE at 07:41

## 2025-06-18 RX ADMIN — ONDANSETRON 4 MG: 2 INJECTION INTRAMUSCULAR; INTRAVENOUS at 09:56

## 2025-06-18 RX ADMIN — FENTANYL CITRATE 50 MCG: 50 INJECTION INTRAMUSCULAR; INTRAVENOUS at 07:40

## 2025-06-18 RX ADMIN — Medication 2 G: at 13:45

## 2025-06-18 RX ADMIN — PROPOFOL 40 MG: 10 INJECTION, EMULSION INTRAVENOUS at 08:38

## 2025-06-18 RX ADMIN — HYDROMORPHONE HYDROCHLORIDE 0.5 MG: 1 INJECTION, SOLUTION INTRAMUSCULAR; INTRAVENOUS; SUBCUTANEOUS at 07:58

## 2025-06-18 RX ADMIN — PROPOFOL 30 MG: 10 INJECTION, EMULSION INTRAVENOUS at 08:13

## 2025-06-18 RX ADMIN — PHENYLEPHRINE HYDROCHLORIDE 50 MCG: 10 INJECTION INTRAVENOUS at 07:59

## 2025-06-18 RX ADMIN — ACETAMINOPHEN 975 MG: 325 TABLET, FILM COATED ORAL at 12:49

## 2025-06-18 RX ADMIN — Medication 2 G: at 07:34

## 2025-06-18 ASSESSMENT — ACTIVITIES OF DAILY LIVING (ADL)
ADLS_ACUITY_SCORE: 27

## 2025-06-18 NOTE — ANESTHESIA PROCEDURE NOTES
Airway       Patient location during procedure: OR       Procedure Start/Stop Times: 6/18/2025 7:43 AM  Staff -        CRNA: Kevin Agustin APRN CRNA       Performed By: CRNA  Consent for Airway        Urgency: elective  Indications and Patient Condition       Indications for airway management: les-procedural       Induction type:intravenous       Mask difficulty assessment: 1 - vent by mask    Final Airway Details       Final airway type: supraglottic airway    Supraglottic Airway Details        Type: LMA       Brand: LMA Unique       LMA size: 3    Post intubation assessment        Placement verified by: capnometry, equal breath sounds and chest rise        Number of attempts at approach: 1       Number of other approaches attempted: 0       Secured with: commercial tube bustamante       Ease of procedure: easy       Dentition: Intact and Unchanged    Medication(s) Administered   Medication Administration Time: 6/18/2025 7:43 AM        
[employer]

## 2025-06-18 NOTE — ANESTHESIA PREPROCEDURE EVALUATION
Anesthesia Pre-Procedure Evaluation    Patient: Leeanne Duarte   MRN: [id number] : 1950          Procedure : Procedure(s):  Arthroplasty, hip, total direct anterior approach, left         Past Medical History:   Diagnosis Date    Anxiety     Depression     Hyponatremia 2017    Osteoporosis     Vitamin D deficiency 2015    Wedge compression fracture of unspecified thoracic vertebra, sequela 2016      Past Surgical History:   Procedure Laterality Date    CATARACT EXTRACTION, BILATERAL  2014    CATARACT IOL, RT/LT  2014    HARVEST BONE MARROW FROM ILIAC CREST Right 2019    Procedure: [place] Bone Marrow From Iliac Crest;  Surgeon: Lane Ventura MD;  Location: [facility] OR    KIDNEY STONE SURGERY      OPTICAL TRACKING SYSTEM FUSION SPINE POSTERIOR LUMBAR THREE+ LEVELS N/A 2019    Procedure: Lumbar 3-4 Decompression, Lumbar 4-Sacral 1 Transforaminal Lumbar Interbody Fusion, Lumbar 5-Sacral 1 Kaminski Maxwell Osteotomy, Lumbar 4-Pelvis Instrumentation, [place] Crest Autograft;  Surgeon: Lane Ventura MD;  Location: [facility]    OTHER SURGICAL HISTORY      HAMSTRING SURGERY      Allergies   Allergen Reactions    Chocolate Hives    Orange Other (See Comments)     Mouth sores    Gabapentin      Confusion        Social History     Tobacco Use    Smoking status: Never     Passive exposure: Never    Smokeless tobacco: Never   Substance Use Topics    Alcohol use: No     Comment: 7 per week      Wt Readings from Last 1 Encounters:   25 42.5 kg (93 lb 9.6 oz)        Anesthesia Evaluation   Pt has had prior anesthetic. Type: General.    No history of anesthetic complications       ROS/MED HX  ENT/Pulmonary:  - neg pulmonary ROS     Neurologic:  - neg neurologic ROS     Cardiovascular:  - neg cardiovascular ROS     METS/Exercise Tolerance:     Hematologic:  - neg hematologic  ROS     Musculoskeletal:   (+)  arthritis,             GI/Hepatic:  - neg  GI/hepatic ROS     Renal/Genitourinary:  - neg Renal ROS     Endo:  - neg endo ROS     Psychiatric/Substance Use:     (+) psychiatric history anxiety and depression       Infectious Disease:  - neg infectious disease ROS     Malignancy:  - neg malignancy ROS     Other:  - neg other ROS            Physical Exam  Airway  Mallampati: I  TM distance: >3 FB  Neck ROM: full  Upper bite lip test: I  Mouth opening: >= 4 cm    Cardiovascular - normal exam  Rhythm: regular  Rate: normal rate     Dental Comments: Upper denture      Pulmonary - normal examBreath sounds clear to auscultation        Neurological - normal exam  She appears awake, alert and oriented x3.    Other Findings       OUTSIDE LABS:  CBC:   Lab Results   Component Value Date    WBC 4.3 06/18/2025    WBC 4.9 01/15/2024    HGB 14.0 06/18/2025    HGB 13.5 06/12/2025    HCT 40.5 06/18/2025    HCT 50.1 (H) 01/15/2024     06/18/2025     01/15/2024     BMP:   Lab Results   Component Value Date     12/27/2024     01/15/2024    POTASSIUM 4.1 12/27/2024    POTASSIUM 5.1 01/15/2024    CHLORIDE 93 (L) 12/27/2024    CHLORIDE 99 01/15/2024    CO2 36 (H) 12/27/2024    CO2 30 (H) 01/15/2024    BUN 12.9 12/27/2024    BUN 11.7 01/15/2024    CR 0.97 (H) 12/27/2024    CR 0.82 01/15/2024    GLC 94 12/27/2024    GLC 96 01/15/2024     COAGS:   Lab Results   Component Value Date    PTT 30 10/07/2009    INR 1.04 10/25/2010     POC:   Lab Results   Component Value Date     (H) 04/03/2019     HEPATIC:   Lab Results   Component Value Date    ALBUMIN 4.0 01/15/2024    PROTTOTAL 7.5 01/15/2024    ALT 13 01/15/2024    AST 33 01/15/2024    ALKPHOS 76 01/15/2024    BILITOTAL 0.7 01/15/2024     OTHER:   Lab Results   Component Value Date    LACT 0.6 (L) 04/05/2019    LIVE 9.9 12/27/2024    PHOS 1.5 (L) 10/27/2010    MAG 1.9 04/12/2019    LIPASE 243 10/24/2010    TSH 2.65 03/11/2022    T4 0.77 03/12/2010    T3 151 03/12/2010    CRP 6.8 10/07/2009    SED 25  06/04/2009       Anesthesia Plan    ASA Status:  1      NPO Status: NPO Appropriate   Anesthesia Type: General.  Airway: oral, supraglottic airway.  Induction: intravenous.   Techniques and Equipment:     - Airway:  Planned airway equipment includes supraglottic airway.     - Monitoring Plan: standard ASA monitoring     Consents    Anesthesia Plan(s) and associated risks, benefits, and realistic alternatives discussed. Questions answered and patient/representative(s) expressed understanding.     - Discussed: anesthesiologist     - Discussed with:  Patient          Blood Consent:      - Discussed with: patient.     - Consented: consented to blood products     Postoperative Care    Pain management: plan for postoperative opioid use, multimodal analgesia.     Comments:                   Shaw Whittaker MD    I have reviewed the pertinent notes and labs in the chart from the past 30 days and (re)examined the patient.  Any updates or changes from those notes are reflected in this note.    Clinically Significant Risk Factors Present on Admission

## 2025-06-18 NOTE — ANESTHESIA POSTPROCEDURE EVALUATION
Patient: Leeanne Duarte    Procedure: Procedure(s):  Arthroplasty, hip, total direct anterior approach, left       Anesthesia Type:  General    Note:     Postop Pain Control: Uneventful            Sign Out: Well controlled pain   PONV: No   Neuro/Psych: Uneventful            Sign Out: Acceptable/Baseline neuro status   Airway/Respiratory: Uneventful            Sign Out: Acceptable/Baseline resp. status   CV/Hemodynamics: Uneventful            Sign Out: Acceptable CV status   Other NRE: NONE   DID A NON-ROUTINE EVENT OCCUR? No           Last vitals:  Vitals Value Taken Time   /51 06/18/25 11:15   Temp 97.16  F (36.2  C) 06/18/25 11:26   Pulse 75 06/18/25 11:26   Resp 0 06/18/25 11:26   SpO2 100 % 06/18/25 11:26   Vitals shown include unfiled device data.    Electronically Signed By: Shaw Whittaker MD  June 18, 2025  11:27 AM

## 2025-06-18 NOTE — PLAN OF CARE
Physical Therapy Discharge Summary     Reason for therapy discharge:    All goals and outcomes met, no further needs identified.     Progress towards therapy goal(s). See goals on Care Plan in Epic electronic health record for goal details.  Goals met     Therapy recommendation(s):    Continue home exercise program. Defer to ortho for timing or need for follow up of OP PT.

## 2025-06-18 NOTE — PRE-PROCEDURE
Leeanne Duarte has been seen and examined by me.    Chart, Labs and Imaging have been reviewed.   Physical exam performed.   No changes noted.   Procedure again discussed in detail.   Risks, benefits and alternatives have been discussed.   All questions answered to patient satisfaction.   Patient and  elect to proceed.   Informed consent obtained and site marked.     Mark Rene MD, FAAOS    Orthopedic Trauma  Adult Reconstruction  Department Orthopedic Surgery  [facility]

## 2025-06-18 NOTE — OP NOTE
[facility]  Orthopedic Operative Note    Direct Anterior Approach Total Hip Arthroplasty     Leeanne Duarte MRN# [id number]   YOB: 1950  Procedure Date:  6/18/2025  Age: 75 year old     PREOPERATIVE DIAGNOSIS: Left hip osteoarthritis    POSTOPERATIVE DIAGNOSIS: Left hip osteoarthritis    PROCEDURE PERFORMED: Direct anterior approach left total hip arthroplasty.    SURGEON:  Mark Rene MD    FIRST ASSISTANT:  Daniel Shanks MD PGY 6 Adult Reconstruction Fellow. I was present for and performed all critical portions of the procedure.    ANESTHESIA: General    EBL: 300 mL    IMPLANTS:   Implant Name Type Inv. Item Serial No.  Lot No. LRB No. Used Action   LINER ACTB OR3O 52MM 40MM 2 MBL [id number] - [id number] Total Joint Component/Insert LINER ACTB OR3O 52MM 40MM 2 MBL [id number]  [employer] [id number] Left 1 Implanted   IMP SHELL SNR ACET R3 3H 52MM [id number] - [id number] Total Joint Component/Insert IMP SHELL SNR ACET R3 3H 52MM [id number]  [employer] [id number] Left 1 Implanted   IMP SCR ACET SNN SPHERICAL HEAD 6.5X25MM [id number] - [id number] Metallic Hardware/[place] IMP SCR ACET SNN SPHERICAL HEAD 6.5X25MM [id number]  [employer] [id number] Left 1 Implanted   IMP SCR ACET SNN SPHERICAL HEAD 6.5X15MM [id number] - [id number] Metallic Hardware/[place] IMP SCR ACET SNN SPHERICAL HEAD 6.5X15MM [id number]  [employer] [id number] Left 1 Implanted   IMP HEAD FEMORAL SNR OXINIUM 12/14 28MM +0 [id number] - [id number] Total Joint Component/Insert IMP HEAD FEMORAL SNR OXINIUM 12/14 28MM +0 [id number]  [employer] [id number] Left 1 Implanted   Collared standard offset with TI/HA size 4 Metallic Hardware/[place]   [employer] [id number] Left 1 Implanted   28mm ID, 40mm OD Hual Mobility insert Metallic Hardware/[place]   [employer] [id number] Left 1 Implanted       FINDINGS: End-stage osteoarthritis     INDICATIONS:  Leeanne Duarte is a 75 year  old year old who presents with end-stage left hip osteoarthritis. Discussed  various operative and nonoperative management options with patient at length.  Risks of surgery discussed included but not limited to bleeding, infection, damage to surrounding neurovascular structures, thigh numbness, leg length difference, dislocation, intraoperative or postoperative fracture, need for revision surgery, blood clots, pulmonary embolus, stroke, anesthetic complications and even death.  No guarantees given or implied. Patient thoughtfully acknowledges risks and wishes to proceed. Signed informed consent obtained,      PROCEDURE:  The patient was identified in the preoperative area per hospital policy, correct operative site marked. Patient brought to the operating room. General anesthesia induced. Transferred onto the [place] table in supine position.  Positioned appropriately with all bony prominences well padded and perineal post in place. Chlorhexidine prescrub performed. ChloraPrep applied. Draped in normal sterile fashion. Antibiotic administration confirmed and tranexamic acid given. Timeout performed per hospital protocol, correctly identifying the patient, operative site, and procedure to be performed.  All in the room agreed.        Using bony landmarks, I made an longitudinal incision for direct anterior approach 8 cm in length extending distally from a point 2 cm distal and 2 cm lateral to the anterior superior iliac spine. Incision  Was carried sharply through skin and subcutaneous tissue, identifying and incising fascia in-line with the incision. Tensor fascia juan was bluntly dissected free and retracted laterally. The ascending branches of the lateral femoral circumflex were identified and cauterized bipolar cautery. Incison made through floor of tensor fascia and pericapsular fat identified and excised. The capsule was exposed. Blunt Cobra retrctors placed proximal and distal around the femoral neck and a T  capsulectomy was performed to expose the femoral neck.  Transverse limb was made across the intertrochanteric line and a vertical limb was made along the anterior aspect of the femoral neck.  Cobra retractors were then placed intracapsular. Soft tissues cleared from the saddle area at the junction of the femoral neck and greater trochanter.  Leg was externally rotated sequentially to allow appropriate medial release to lesser trochanter.  The leg was then rotated back to a neutral position. I performed a femoral neck osteotomy at the appropriate position based on templating utilizing the lesser trochanter as a reference.  Leg externally rotated 45 degrees.  Gentle traction applied to clear the resected femoral neck from the acetabulum.  Femoral head removed with cork screw.  Attention then turned toward preparation acetabulum.  Acetabulum was exposed by placing retractors anterior, posterior and inferior around the acetabulum. Labrum was excised. Pulvinar was excised. Capsule was  released into piriformis fossa.  Under fluoroscopy, we began by medializing appropriately.  We then reamed up sequentially from a 46 mm to a size 52 mm. Directly visualized good bleeding bone and fluoroscopy utilized to ensure medialization to teardrop.  Acetabulum irrigated with saline.  Irrisept soak performed.  Again irrigated with pulsatile saline.  Under fluoroscopy, impacted a 52 mm cup in approximately 20 degrees of anteversion and 40 degrees of abduction.  Fluoroscopic imaging utilized to ensure appropriate abduction and anteversion.  These positions were confirmed utilizing AP and 20 degree oblique view.  The cup sat well and I was able to rock the pelvis with movement of the insertion handle.  Palpation of the cup also indicated it was appropriately positioned.  2 screws then placed with excellent purchase.  Fluoroscopic imaging in the cup and screw also confirmed appropriate placement.  A dual mobility liner was impacted to  accommodate 40 mm head.       I then turned attention to the femur. The lower extremity was externally rotated while I performed appropriate additional posterior releases. I then placed the hip in extension and adduction in addition to the external rotation and. Using a blunt bone hook placed proximally to the gluteal sling and proximal femoral retractor placed anterior to the femoral neck, I ensured the proximal femur could be appropriately visualized for preparation by sequentially releasing posterior structures as needed.  Anterior to the greater trochanter was clearing the posterior wall of the acetabulum.  We began preparation by utilizing a box osteotome and a lateralizer.we then broached sequentially maintaining appropriate anteversion until felt we had appropriate fit and fill of the proximal femur.  Performed appropriate trial reductions until we were satisfied that we had accomplished appropriate leg lengths and good fit of the stem.  The hip was then again dislocated with gentle traction external rotation and placed in a position of extension, adduction and external rotation to expose the proximal femur.  Trial components removed.      I Thoroughly irrigated the hip with sterile saline pulse lavage followed by Irrisept followed by repeat saline irrigation and placed the final size 4 standard offset collared stem with 40 mm +0 neck length. Head was impacted after cleaning and drying the [place] taper. It was stable to external pull with a bone hook and neutral and 90 degrees.  Minimal shock and no subluxation whatsoever on extremes of external rotation.  No impingement noted.  Performed a repeat Irrisept soak.  Performed pulsatile saline lavage again.  Vancomycin powder placed. Repaired the tensor fascia juan with 0 Vicryl, subcutaneous with 2-0 Vicryl, and skin with 3-0 Monocryl running subcuticular with Dermabond. Sterile Aquacel Ag dressing applied.   Sponge and needle counts were correct. Transferred  safely off the [place] table, stable to PACU.     PLAN:   Anticipate discharge on postoperative day 0 if patient meets criteria.  If patient fails to meet criteria will discharge on postoperative day #1.  Weightbearing as tolerated with assistive devices as needed.   Physical therapy postoperative day 0.   DVT prophylaxis with aspirin 81 mg p.o. twice daily x 4 weeks.    Antibiotics x 24 hours.   Maintain Aquacel dressing for 1 week minimum.    May keep in place for 2 weeks if patient prefers.  May shower over dressing.  If dressing is removed at 1 week May shower over wound.  No submerging wound until cleared by orthopedics.  PACU x-rays, AP pelvis and lateral  right hip.   Follow up with in 2 weeks for wound check with new x-rays, AP pelvis and lateral  left hip.     Mark Rene MD, FAAOS    Orthopedic Trauma  Adult Reconstruction  Department Orthopedic Surgery  [facility]

## 2025-06-18 NOTE — PROGRESS NOTES
06/18/25 1600   Appointment Info   Signing Clinician's Name / Credentials (PT) Natalya Mckeon DPT   Quick Adds   Quick Adds Certification   Living Environment   People in Home spouse   Current Living Arrangements house   Home Accessibility stairs to enter home;stairs within home   Number of Stairs, Main Entrance 2   Stair Railings, Main Entrance railings safe and in good condition   Number of Stairs, Within Home, Primary greater than 10 stairs   Stair Railings, Within Home, Primary railings safe and in good condition   Self-Care   Equipment Currently Used at Home grab bar, tub/shower;walker, rolling;cane, straight  (Safety frame)   Fall history within last six months no   Activity/Exercise/Self-Care Comment Pt was able to perform IADLs and ADLs   General Information   Onset of Illness/Injury or Date of Surgery 06/18/25   Referring Physician Dr. Rene   Patient/Family Therapy Goals Statement (PT) Pt reports hoping to DC home   Pertinent History of Current Problem (include personal factors and/or comorbidities that impact the POC) Pt is status post total hip arthroplasty   Existing Precautions/Restrictions fall   Weight-Bearing Status - LLE weight-bearing as tolerated   Cognition   Affect/Mental Status (Cognition) WNL   Orientation Status (Cognition) oriented x 4   Follows Commands (Cognition) WNL   Pain Assessment   Patient Currently in Pain Yes, see Vital Sign flowsheet   Integumentary/Edema   Integumentary/Edema Comments as expected following surgery   Range of Motion (ROM)   Range of Motion ROM deficits secondary to surgical procedure   Strength (Manual Muscle Testing)   Strength (Manual Muscle Testing) Deficits observed during functional mobility   Bed Mobility   Comment, (Bed Mobility) cues only   Transfers   Comment, (Transfers) SBA   Gait/Stairs (Locomotion)   Comment, (Gait/Stairs) SBA, use of FWW, step to initially   Balance   Balance Comments no LOB, but B UEs on walker needed   Sensory Examination    Sensory Perception patient reports no sensory changes   Clinical Impression   Criteria for Skilled Therapeutic Intervention Yes, treatment indicated   PT Diagnosis (PT) decreased functional mobility   Influenced by the following impairments decreased ROM, decreased strength, pain, decreased balance   Functional limitations due to impairments decreased bed mob, transfers, ambulation, stairs   Clinical Presentation (PT Evaluation Complexity) stable   Clinical Presentation Rationale improving medically   Clinical Decision Making (Complexity) low complexity   Planned Therapy Interventions (PT) balance training;bed mobility training;gait training;home exercise program;patient/family education;stair training;transfer training;progressive activity/exercise;risk factor education;home program guidelines   Risk & Benefits of therapy have been explained evaluation/treatment results reviewed;care plan/treatment goals reviewed;risks/benefits reviewed;current/potential barriers reviewed;participants voiced agreement with care plan;participants included;patient;spouse/significant other   PT Total Evaluation Time   PT Eval, Low Complexity Minutes (48409) 5   Therapy Certification   Start of care date 06/18/25   Certification date from 06/18/25   Certification date to 06/18/25   Medical Diagnosis s/p PEREZ   Physical Therapy Goals   PT Frequency One time eval and treatment only   PT Predicted Duration/Target Date for Goal Attainment 06/18/25   PT Goals Bed Mobility;Transfers;Gait;Stairs   PT: Bed Mobility Independent;Supine to/from sit   PT: Transfers Modified independent;Sit to/from stand;Bed to/from chair;Assistive device   PT: Gait Modified independent;150 feet;Rolling walker   PT: Stairs Modified independent;Assistive device;Greater than 10 stairs;Rail on right   Interventions   Interventions Quick Adds Gait Training;Therapeutic Activity;Therapeutic Procedure   Therapeutic Procedure/Exercise   Ther. Procedure: strength,  endurance, ROM, flexibillity Minutes (99416) 13   Symptoms Noted During/After Treatment increased pain   Treatment Detail/Skilled Intervention Pt was educated on APs, QS, GS, HS, heel slides, SAQ and SLR, hip abd in supine. Pt cued for seated exercises: LAQ. 5 of each.   Therapeutic Activity   Therapeutic Activities: dynamic activities to improve functional performance Minutes (79069) 15   Treatment Detail/Skilled Intervention Pt was educated to reach back prior to sitting and to push up from surface for standing not to pull on walker. Discussed and practiced simulated car transfer, educated on sit<>stand from toilet. Discussed high bed hieght and pacticed from elevated surface. Educated on laying flat during day to improve hip extension. Discussed pillow use at night to improve pain and sleeping on side. Pt educated to discuss with surgeon re: OP PT. Pt educated on walking program of walking 3-4 times per day starting with 5 mins and increased time as able each day. Discussed starting in house than increasing to outside but knowing tolerance for time. Pt was educated on supine to/from sit inde. Pt declines lightheadness with activity. 3-5/10 pain throughout.   Gait Training   Gait Training Minutes (83665) 10   Symptoms Noted During/After Treatment (Gait Training) increased pain   Treatment Detail/Skilled Intervention Pt was cued for 75 feet of ambulation with FWW, cues for heel/toe pattern and step through, able to perform. Walker adjusted to proper height. Pt was cued for stairs with proper pattern. Pt cued with and without SEC, CGA without SEC, SBA with SEC. 16 stairs. Pt  present for all education on stairs and educated on helping with moving walker up and down to landing.   PT Discharge Planning   PT Plan dc   PT Discharge Recommendation (DC Rec)   (defer to ortho)   PT Rationale for DC Rec Pt meeting all PT goals for DC home with supportive spouse.   PT Brief overview of current status Pt able to meet  PT goals, 75 feet of ambulation, stairs and exercises performed. Has walker for DC home, educated on getting SEC for stairs   PT Total Distance Amb During Session (feet) 75   Physical Therapy Time and Intention   Timed Code Treatment Minutes 38   Total Session Time (sum of timed and untimed services) 43   [facility]                                                                                   OUTPATIENT PHYSICAL THERAPY    PLAN OF TREATMENT FOR OUTPATIENT REHABILITATION   Patient's Last Name, First Name, Leeanne Cowart YOB: 1950   Provider's Name   [facility]   Medical Record No.  [id number]     Onset Date: 06/18/25 Start of Care Date: (P) 06/18/25     Medical Diagnosis:  (P) s/p PEREZ               PT Diagnosis:  decreased functional mobility Certification Dates:  From: (P) 06/18/25  To: (P) 06/18/25       See note for plan of treatment, functional goals, and certification details.    I CERTIFY THE NEED FOR THESE SERVICES FURNISHED UNDER        THIS PLAN OF TREATMENT AND WHILE UNDER MY CARE (Physician co-signature of this document indicates review and certification of the therapy plan).

## 2025-06-18 NOTE — ANESTHESIA CARE TRANSFER NOTE
Patient: Leeanne Duarte    Procedure: Procedure(s):  Arthroplasty, hip, total direct anterior approach, left       Diagnosis: Primary osteoarthritis of left hip [M16.12]  Diagnosis Additional Information: No value filed.    Anesthesia Type:   General     Note:    Oropharynx: oropharynx clear of all foreign objects and spontaneously breathing  Level of Consciousness: drowsy  Oxygen Supplementation: face mask  Level of Supplemental Oxygen (L/min / FiO2): 6  Independent Airway: airway patency satisfactory and stable  Dentition: dentition unchanged  Vital Signs Stable: post-procedure vital signs reviewed and stable  Report to RN Given: handoff report given  Patient transferred to: PACU    Handoff Report: Identifed the Patient, Identified the Reponsible Provider, Reviewed the pertinent medical history, Discussed the surgical course, Reviewed Intra-OP anesthesia mangement and issues during anesthesia, Set expectations for post-procedure period and Allowed opportunity for questions and acknowledgement of understanding  Vitals:  Vitals Value Taken Time   /59 06/18/25 10:25   Temp     Pulse 84 06/18/25 10:28   Resp 0 06/18/25 10:28   SpO2 100 % 06/18/25 10:28   Vitals shown include unfiled device data.    Electronically Signed By: ISREAL Yarbrough CRNA  June 18, 2025  10:29 AM

## 2025-06-18 NOTE — PLAN OF CARE

## 2025-06-18 NOTE — DISCHARGE INSTRUCTIONS
ЕЛЕНА ERICKSON M.D.    CLINIC PHONE NUMBER:   [phone]   [facility]     Maximum Acetaminophen/Tylenol in 24 hours = 4,000 mg. Today you already had 1,950 mg.   You had 975 mg. At 1300. Next possible dose at 7 PM.    Maximum Ibuprofen/Motrin in 24 hours = 2,400 mg. You may start anytime.

## 2025-06-19 NOTE — PROGRESS NOTES
Post op discharge phone call for Dr. GILLIAM discharge patients:  [facility] Unit MS[facility]    Type of surgery:Left total hip  Date of surgery:6/18    HI,  I am a registered nurse calling from the Orthopedic unit at [facility], checking in to see how you are doing following your Knee surgery.     Is your pain level manageable?    Are you having any new or increased numbness or tingling in your surgical leg?   Are you having increased swelling  or drainage around your surgical site or surgical dressing?  Have you been able to walk short distances around your house?  Have you been able to urinate since you have been home?  Do you have your first physical therapy session set up?  Do you have any questions or concerns at this time?      Thank you, have a great recovery!        Patient did not answer, message left.

## 2025-06-19 NOTE — PROGRESS NOTES
[facility]   ORTHOPEDIC SURGERY CLINIC  [place]    PATIENT:   Leeanne Duarte  YOB: 1950  DATE OF SERVICE:   07/08/25    CHIEF COMPLAINT:  Status post left total hip arthroplasty    PROCEDURES:  6/18/2025: Direct anterior approach left total hip arthroplasty.     04/03/2019 - Lane Ventura MD   1. L3 through L4 laminectomy and partial medial facetectomies and foraminotomies.  2. L5-S1 Kaminski Maxwell Osteotomy for regional deformity correction.  3. Transforaminal lumbar interbody fusion at L4-5 and L5-S1, anterior and posterior fusion through a single approach.  4. L4-5 and L5-S1 Capstone PEEK Cage.  5. L4 through S1 Posterior Spinal Fusion.  6. L4 through Pelvis Posterior Spinal Instrumentation, Medtronic Solera.  7. [place] and use of Iliac Crest Autograft, which was harvested through a separate fascial incision.  8. Use of O-Arm navigational guidance.  9. Use of Allograft      HISTORY OF PRESENT ILLNESS:  Leeanne Duarte is a 75 year old who is following up post-operatively for the above procedure, now 3 weeks post-op.  Patient has stopped  taking Aspirin 81 mg twice daily for DVT prophylaxis after 2 weeks.  She denies any swelling or pain in her lower leg.  Patient states she is doing very well overall.  She denies any pain, not taking any pain meds.  She removed the aquacell dressing after 1 week, and has no concerns regarding the surgical incision.  Denies any numbness or tingling.  She does feel that the left lower extremity is slightly longer than the right at this point.  She denies any hip instability.  Denies any balance issues.  Denies any tripping issues.  Not utilizing any assistive devices. She is again accompanied by her  who is here for entire history and physical.      PHYSICAL EXAM:  Awake alert and oriented in no apparent distress.  Focused exam of the left lower extremity demonstrates deformity.  Left lower  extremity is approximately 5 mm longer than the right.    Surgical incisions healing very well.  Skin is clean, dry and intact.   Mild firmness around incision consistent with resolving subcutaneous hematoma.  No drainage and nothing is expressible.  Erythema, warmth, ecchymosis or swelling.  No lymphedema.  No knee effusion.  No tenderness to palpation.  Range of motion:  Hip: extension 0 , flexion 100 , internal rotation 20 , external rotation 50  without pain or apprehension.  Knee: extension 0 , flexion 140   Ankle: dorsiflexion 20 , plantarflexion 50   CMS intact distally.  Intact sensation in lateral femoral cutaneous, saphenous, sural, superficial peroneal, deep peroneal and tibial distributions.    Motor intact in quadriceps, hamstrings, abductors, tibialis anterior, extensor hallucis longus, and gastrocsoleus.   Pedal pulses intact and capillary refill brisk.    IMAGING:  AP and crosstable lateral left hip obtained today personally reviewed by me.  Stable appropriate alignment of total hip arthroplasty components.  Limb lengths appear nearly equal based on measurement from inferior aspect of ischium to lesser trochanters  No evidence of loosening, migration or failure.  No periprosthetic fracture.  No evidence of osteomyelitis.    LABS:  None    ASSESSMENT:  Doing well 2 weeks postoperatively with no apparent acute complications.     PLAN:  Long detailed discussion with the patient and her  regarding my findings today  We discussed the limb length inequality.  I advised them of my disappointment with this result and explained how we attempted to prevent it intraoperatively.   Preoperative, intraoperative and postoperative images reviewed with the patient and explained  I advised that preoperatively she had significant shortening of approximately 1 cm and now has approximately 5 mm of lengthening  I explained that the deformity of her left ischium from previous hamstring repair and infection possibly  resulted in misjudgment of her limb length  We discussed options for management  Given the patient is not having any issues with balance or tripping and is not requiring any assistive devices we will assess if she acclimates to the limb length change over the next 3 weeks  If she fails to adequately acclimate and would like to do so  we will obtain a custom shoe lift for the prosthetics and orthotics.  Resume DVT prophylaxis with aspirin 81 mg p.o. twice daily for 1 additional week to complete DVT prophylaxis  I advised I am not concerned regarding DVT at this time given her high level of activity postoperatively  Weight-bear as tolerated  Range of motion as tolerated  Low impact activities  May return to driving as tolerated  Assistive devices as needed  Over-the-counter pain medication as needed   follow-up in 4 weeks    X-RAYS NEXT VISIT:  AP pelvis and crosstable lateral left hip    Mark Rene MD, FAAOS    Orthopedic Trauma  Adult Reconstruction  Department of Orthopedic Surgery  [facility]

## 2025-06-19 NOTE — OR NURSING
"Post op discharge phone call for Dr. GILLIAM discharge patients:  [facility] Unit [facility]    Type of surgery:Left PEREZ  Date of surgery:06/18/25     Patient did not answer follow up call. Left message.     'I am a registered nurse calling from the Orthopedic unit at [facility], checking in to see how you are doing following your Knee surgery.  '    \"Please call Dr. Edouard's office ([phone]) of any concerns as you daily review your stoplight tool for symptom management.  Thank you, have a great recovery! \"  "

## 2025-07-06 ASSESSMENT — HOOS JR: HOOS JR TOTAL INTERVAL SCORE: 100

## 2025-07-08 ENCOUNTER — OFFICE VISIT (OUTPATIENT)
Dept: ORTHOPEDICS | Facility: CLINIC | Age: 75
End: 2025-07-08
Payer: COMMERCIAL

## 2025-07-08 DIAGNOSIS — Z47.89 ORTHOPEDIC AFTERCARE: Primary | ICD-10-CM

## 2025-07-08 PROCEDURE — 99024 POSTOP FOLLOW-UP VISIT: CPT | Performed by: ORTHOPAEDIC SURGERY

## 2025-07-10 ENCOUNTER — OFFICE VISIT (OUTPATIENT)
Dept: INTERNAL MEDICINE | Facility: CLINIC | Age: 75
End: 2025-07-10
Payer: COMMERCIAL

## 2025-07-10 VITALS
OXYGEN SATURATION: 98 % | BODY MASS INDEX: 20.15 KG/M2 | DIASTOLIC BLOOD PRESSURE: 66 MMHG | RESPIRATION RATE: 16 BRPM | HEART RATE: 83 BPM | WEIGHT: 96 LBS | SYSTOLIC BLOOD PRESSURE: 122 MMHG | TEMPERATURE: 98.4 F | HEIGHT: 58 IN

## 2025-07-10 DIAGNOSIS — F41.1 GENERALIZED ANXIETY DISORDER: ICD-10-CM

## 2025-07-10 DIAGNOSIS — D89.89 INFLAMMATORY AUTOIMMUNE DISORDER: ICD-10-CM

## 2025-07-10 DIAGNOSIS — M81.0 AGE-RELATED OSTEOPOROSIS WITHOUT CURRENT PATHOLOGICAL FRACTURE: Primary | ICD-10-CM

## 2025-07-10 DIAGNOSIS — Z87.81 HISTORY OF VERTEBRAL FRACTURE: ICD-10-CM

## 2025-07-10 PROBLEM — S22.089A: Status: RESOLVED | Noted: 2017-06-27 | Resolved: 2025-07-10

## 2025-07-10 LAB
ANION GAP SERPL CALCULATED.3IONS-SCNC: 9 MMOL/L (ref 7–15)
BUN SERPL-MCNC: 16.2 MG/DL (ref 8–23)
CALCIUM SERPL-MCNC: 9.6 MG/DL (ref 8.8–10.4)
CHLORIDE SERPL-SCNC: 98 MMOL/L (ref 98–107)
CREAT SERPL-MCNC: 0.8 MG/DL (ref 0.51–0.95)
EGFRCR SERPLBLD CKD-EPI 2021: 76 ML/MIN/1.73M2
GLUCOSE SERPL-MCNC: 89 MG/DL (ref 70–99)
HCO3 SERPL-SCNC: 32 MMOL/L (ref 22–29)
POTASSIUM SERPL-SCNC: 3.5 MMOL/L (ref 3.4–5.3)
SODIUM SERPL-SCNC: 139 MMOL/L (ref 135–145)
VIT D+METAB SERPL-MCNC: 50 NG/ML (ref 20–50)

## 2025-07-10 NOTE — PATIENT INSTRUCTIONS
Prolia 15th today. Labs today.  Prolia 16th in 6 months with my nurse. I will see you in 1 year.    DXA due now.   Phone number to schedule [phone].    Daily calcium need is 2326-4637 mg a day from the diet and supplements.  Calcium citrate is easier to digest.      Risk of rebound vertebral fractures is higher when Prolia suddenly stopped or dose was missed.      Prolia and Covid vaccine should be  for at least a week.    Patient education:  Patients advised to maintain good oral hygiene during treatment, because of the risk for osteonecrosis of the jaw.   The risk of osteonecrosis of the jaw with Prolia therapy is extremely low.   If a patient is late for Prolia therapy (>3 wks) a rapid rebound of bone loss can occur which is highly concerning and important to try to prevent to optimize bone health.   Therefore if an invasive dental procedure is required, primarily extraction or implant, while on Prolia therapy, the recommendation is to time the dental procedure optimally 4 months after the most recent dose of Prolia allowing 6-8 weeks for healing prior to next dose of Prolia.   This is based on the updated 2022 Recommendations from the American Association of Oral and Maxillofacial Surgeons.   We also recommend discussing with dentist or oral surgeon if pretreatment prophylactic oral rinses and antibiotics would be beneficial.   Optimizing oral hygiene before any invasive dental procedure significantly lowers ONJ risk.     There is a greater risk of severe hypocalcemia following denosumab administration and patient is advised that we will have to monitor calcium level.  Risk of infection is increased with denosumab use, so patient will inform me if has more frequent infections.     Atypical femur fracture  has been reported in patients receiving denosumab. The fractures may occur anywhere along the femoral shaft (may be bilateral) and commonly occur with minimal to no trauma to the area. Some  patients experience prodromal thigh or groin pain weeks or months before the fracture occurs. Contralateral limb should be assessed if AFF occurs.     Multiple vertebral column fracture has been reported following discontinuation of therapy. Hypertension and increased cholesterol were reported with denosumab use.      Discussed 10-year data of Prolia. Discussed the importance of being on time and consistent with Prolia use to prevent rapid bone of osteoclastic activity.  Discussed that if Prolia is discontinued we will likely need to utilize an antiresorptive, such as Reclast, to help prevent rapid rebound of osteoclast activity. Often more than 1 dose is required.  Labs yearly to ensure calcium and vitamin D optimized while patient on Prolia.

## 2025-07-10 NOTE — PROGRESS NOTES
Patient is here today for the Prolia injection. Patient tolerated previous injections well. No recent falls, new fractures, medication change, hospitalizations or surgeries. We discussed calcium and vit D daily needs today.   We discussed high risk of rebound vertebral fractures when Prolia suddenly stopped.      (M81.0) Age-related osteoporosis without current pathological fracture  (primary encounter diagnosis)  Previous treatment:  Reclast x 1 in 2016, had experienced severe muscle aches and pains for the few days after the infusion.   On Prolia since 2017.  History of fractures: T 11 compression fracture.  DXA scan done 12/2022,  showed stable osteopenia in both hips   We will continue Prolia every 6 months.  Component      Latest Ref Rng 12/27/2024  3:07 PM   Sodium      135 - 145 mmol/L 138    Potassium      3.4 - 5.3 mmol/L 4.1    Chloride      98 - 107 mmol/L 93 (L)    Carbon Dioxide (CO2)      22 - 29 mmol/L 36 (H)    Anion Gap      7 - 15 mmol/L 9    Urea Nitrogen      8.0 - 23.0 mg/dL 12.9    Creatinine      0.51 - 0.95 mg/dL 0.97 (H)    GFR Estimate      >60 mL/min/1.73m2 61    Calcium      8.8 - 10.4 mg/dL 9.9    Glucose      70 - 99 mg/dL 94    Vitamin D, Total (25-Hydroxy)      20 - 50 ng/mL 111 (H)    She is not taking vit D supplements. We will recheck labs today.  Plan: DX Bone Density, Vitamin D Deficiency, Basic         metabolic panel            (Z87.81) History of vertebral fracture  History of fractures: T 11 compression fracture.    (F41.1) Generalized anxiety disorder  stable on Cymbalta  Taking SSRI s is associated with lower bone mineral density in children and adults. In a study of adults over the age of 50, use of SSRIs not only lowered bone mineral density but they also increased the odds of falling and doubled the risk of osteoporosis fractures    (D89.89) Inflammatory autoimmune disorder   per patient, she was diagnosed with SLE many years ago, but is not on the chronic treatment and  does not see Rheumatologist. She was on prednisone therapy many times in the past, but not recently.     The longitudinal plan of care for the diagnosis(es)/condition(s) as documented were addressed during this visit. Due to the added complexity in care, I will continue to support Leeanne in the subsequent management and with ongoing continuity of care.    Patient was educated on safety of Prolia utilizing Patient Counseling Chart for Healthcare Providers, as outlined by the Prolia REMS progam.     Return in about 6 months (around 1/10/2026) for Prolia with CSS.    Patient Instructions   Prolia 15th today. Labs today.  Prolia 16th in 6 months with my nurse. I will see you in 1 year.    DXA due now.   Phone number to schedule [phone].    Daily calcium need is 2630-2751 mg a day from the diet and supplements.  Calcium citrate is easier to digest.      Risk of rebound vertebral fractures is higher when Prolia suddenly stopped or dose was missed.      Prolia and Covid vaccine should be  for at least a week.    Patient education:  Patients advised to maintain good oral hygiene during treatment, because of the risk for osteonecrosis of the jaw.   The risk of osteonecrosis of the jaw with Prolia therapy is extremely low.   If a patient is late for Prolia therapy (>3 wks) a rapid rebound of bone loss can occur which is highly concerning and important to try to prevent to optimize bone health.   Therefore if an invasive dental procedure is required, primarily extraction or implant, while on Prolia therapy, the recommendation is to time the dental procedure optimally 4 months after the most recent dose of Prolia allowing 6-8 weeks for healing prior to next dose of Prolia.   This is based on the updated 2022 Recommendations from the American Association of Oral and Maxillofacial Surgeons.   We also recommend discussing with dentist or oral surgeon if pretreatment prophylactic oral rinses and antibiotics would be  "beneficial.   Optimizing oral hygiene before any invasive dental procedure significantly lowers ONJ risk.     There is a greater risk of severe hypocalcemia following denosumab administration and patient is advised that we will have to monitor calcium level.  Risk of infection is increased with denosumab use, so patient will inform me if has more frequent infections.     Atypical femur fracture  has been reported in patients receiving denosumab. The fractures may occur anywhere along the femoral shaft (may be bilateral) and commonly occur with minimal to no trauma to the area. Some patients experience prodromal thigh or groin pain weeks or months before the fracture occurs. Contralateral limb should be assessed if AFF occurs.     Multiple vertebral column fracture has been reported following discontinuation of therapy. Hypertension and increased cholesterol were reported with denosumab use.      Discussed 10-year data of Prolia. Discussed the importance of being on time and consistent with Prolia use to prevent rapid bone of osteoclastic activity.  Discussed that if Prolia is discontinued we will likely need to utilize an antiresorptive, such as Reclast, to help prevent rapid rebound of osteoclast activity. Often more than 1 dose is required.  Labs yearly to ensure calcium and vitamin D optimized while patient on Prolia.            /66 (BP Location: Right arm, Patient Position: Sitting, Cuff Size: Adult Regular)   Pulse 83   Temp 98.4  F (36.9  C)   Resp 16   Ht 1.473 m (4' 10\")   Wt 43.5 kg (96 lb)   LMP  (LMP Unknown)   SpO2 98%   BMI 20.06 kg/m        Did you experience any problems with previous Prolia injection? no  Any medication change in the last 6 months? no  Did you take prednisone or other immunosupressant drugs in the last 6 months   (chemo, transplant, rheum, dermatology conditions)? no  Did you have any serious infection in the last 6 months?no  Any recent hospitalizations?no  Do you plan " any dental work in the next 2-3 months?no  How much calcium do you take daily from the diet and supplements?1200 mg  How much vit D do you take daily? 2000 IU  Last DXA?  Reviewed and discussed            This note has been dictated using voice recognition software. Any grammatical or context distortions are unintentional and inherent to the software      Patient Active Problem List   Diagnosis    Inflammatory autoimmune disorder    Raynaud's disease    Chronic bilateral low back pain without sciatica    Primary insomnia    Osteoporosis    Degenerative scoliosis in adult patient    Anterolisthesis    Generalized anxiety disorder    Major depression, recurrent    Tubular adenoma of colon    Status post lumbar spine surgery for decompression of spinal cord    History of vertebral fracture       Current Outpatient Medications   Medication Sig Dispense Refill    calcium citrate (CITRACAL) 950 (200 Ca) MG tablet Take 1 tablet by mouth daily.      denosumab (PROLIA) 60 MG/ML SOSY injection Inject 60 mg Subcutaneous      DULoxetine (CYMBALTA) 60 MG capsule Take 60 mg by mouth daily.      LORazepam (ATIVAN) 0.5 MG tablet Take 0.75 mg by mouth daily.       Current Facility-Administered Medications   Medication Dose Route Frequency Provider Last Rate Last Admin    denosumab (PROLIA) injection 60 mg  60 mg Subcutaneous Q6 Months    60 mg at 07/10/25 1385

## 2025-07-31 ASSESSMENT — HOOS JR: HOOS JR TOTAL INTERVAL SCORE: 100

## 2025-08-05 ENCOUNTER — OFFICE VISIT (OUTPATIENT)
Dept: ORTHOPEDICS | Facility: CLINIC | Age: 75
End: 2025-08-05
Payer: COMMERCIAL

## 2025-08-05 DIAGNOSIS — Z47.89 ORTHOPEDIC AFTERCARE: Primary | ICD-10-CM

## 2025-08-05 PROCEDURE — 99024 POSTOP FOLLOW-UP VISIT: CPT | Performed by: ORTHOPAEDIC SURGERY

## (undated) DEVICE — POSITIONER ARMBOARD FOAM 1PAIR LF FP-ARMB1

## (undated) DEVICE — BRUSH SURGICAL SCRUB W/4% CHG SOL 25ML 371073

## (undated) DEVICE — BLADE SAW SAGITTAL STRK 18X90X1.27MM HD SYS 6 6118-127-090

## (undated) DEVICE — CATH TRAY FOLEY SURESTEP 16FR WDRAIN BAG STLK LATEX A300316A

## (undated) DEVICE — HOOD SURG T7PLUS PEEL AWAY FACE SHIELD STRL LF 0416-801-100

## (undated) DEVICE — SOLUTION IV 0.9% NACL 250ML L8002

## (undated) DEVICE — SOL WATER IRRIG 1000ML BOTTLE 2F7114

## (undated) DEVICE — CLEANSER JET LAVAGE IRR 0.05% CHG ISEPT-450-USA

## (undated) DEVICE — ESU BIPOLAR SEALER AQUAMANTYS 6MM 23-112-1

## (undated) DEVICE — SU ETHILON 3-0 PS-1 18" 1663H

## (undated) DEVICE — BONE WAX 2.5GM W31G

## (undated) DEVICE — LINEN HALF SHEET 5512

## (undated) DEVICE — LINEN FULL SHEET 5511

## (undated) DEVICE — DRAPE X-RAY TUBE 00-901169-01-OEC

## (undated) DEVICE — DRSG AQUACEL AG HYDROFIBER  3.5X10" 422605

## (undated) DEVICE — SPECIMEN CONTAINER 5OZ STERILE 2600SA

## (undated) DEVICE — ESU PENCIL SMOKE EVAC W/ROCKER SWITCH 0703-047-000

## (undated) DEVICE — MARKER SPHERZ PACK 5

## (undated) DEVICE — DRAPE O ARM TUBE 9732722

## (undated) DEVICE — GLOVE PROTEXIS BLUE W/NEU-THERA 8.0  2D73EB80

## (undated) DEVICE — GLOVE PROTEXIS POWDER FREE 8.0 ORTHO LIME 2D73ET80

## (undated) DEVICE — SU VICRYL 0 CT-1 CR 8X18" J740D

## (undated) DEVICE — PREP CHLORAPREP 26ML TINTED ORANGE  260815

## (undated) DEVICE — DRAPE LAP W/ARMBOARD 29410

## (undated) DEVICE — BLADE BONE MILL STRK 5.0MM MED 5400-701-000

## (undated) DEVICE — PACK SET-UP STD 9102

## (undated) DEVICE — DRAIN JACKSON PRATT ROUND W/TROCAR 15FR LF JP-HUR151

## (undated) DEVICE — MIDAS REX DIFFUSER LUBRICANT LEGEND PA100-A

## (undated) DEVICE — SU MONOCRYL 2-0 SH 27" UND Y417H

## (undated) DEVICE — DRAPE CONVERTORS U-DRAPE 60X72" 8476

## (undated) DEVICE — LINEN TOWEL PACK X30 5481

## (undated) DEVICE — SPONGE COTTONOID 1X3" 80-1408

## (undated) DEVICE — PACK TOTAL HIP RIDGES LATEX PO15HIFSG

## (undated) DEVICE — MIDAS REX DISSECTING TOOL 4MM BALL 140MM 14BA40

## (undated) DEVICE — SET HANDPIECE INTERPULSE W/COAXIAL FAN SPRAY TIP 0210118000

## (undated) DEVICE — SYR 03ML LL W/O NDL 309657

## (undated) DEVICE — KIT PATIENT CARE PROAXIS SYSTEM 6988-PV-ACP

## (undated) DEVICE — DRSG AQUACEL AG 3.5X9.75" HYDROFIBER 412011

## (undated) DEVICE — Device

## (undated) DEVICE — GLOVE PROTEXIS W/NEU-THERA 8.5  2D73TE85

## (undated) DEVICE — BRUSH SURGICAL SCRUB W/4% CHLORHEXIDINE GLUCONATE SOL 4458A

## (undated) DEVICE — ESU ELEC BLADE 2.75" COATED/INSULATED E1455

## (undated) DEVICE — DRAPE IOBAN INCISE 23X17" 6650EZ

## (undated) DEVICE — SPONGE COTTONOID 1X1" 80-1403

## (undated) DEVICE — SPONGE SURGIFOAM 100 1974

## (undated) DEVICE — DRSG TEGADERM 4X4 3/4" 1626W

## (undated) DEVICE — LINEN ORTHO ACL PACK 5447

## (undated) DEVICE — SU ETHIBOND 5 V-37 4X30" MB66G

## (undated) DEVICE — WIPES FOLEY CARE SURESTEP PROVON DFC100

## (undated) DEVICE — SUTURE VICRYL+ 0 CT-1 36IN VLT VCP346H

## (undated) DEVICE — SUCTION MANIFOLD DORNOCH ULTRA CART UL-CL500

## (undated) DEVICE — CELL SAVER

## (undated) DEVICE — SU MONOCRYL 3-0 PS-2 18" UND Y497G

## (undated) DEVICE — SOL NACL 0.9% IRRIG 3000ML BAG 2B7477

## (undated) DEVICE — DRAPE TIBURON TOP SHEET 100X60" 29352

## (undated) DEVICE — GLOVE PROTEXIS POWDER FREE 6.5 ORTHO LIME 2D73ET65

## (undated) DEVICE — RX SURGIFLO HEMOSTATIC MATRIX W/THROMBIN 8ML NEXTGEN 2993

## (undated) DEVICE — ESU GROUND PAD UNIVERSAL W/O CORD

## (undated) DEVICE — SU VICRYL 2-0 CT-1 27" UND J259H

## (undated) DEVICE — BASIN SET MAJOR

## (undated) DEVICE — SPONGE COTTONOID 1/2X1/2" 80-1400

## (undated) DEVICE — SU DERMABOND ADVANCED .7ML DNX12

## (undated) DEVICE — SOL ISOPROPYL RUBBING ALCOHOL USP 70% 4OZ HDX-20 I0020

## (undated) DEVICE — TAPE MEDIPORE 4"X2YD 2864

## (undated) DEVICE — SU STRATAFIX PDS PLUS 1 CT-1 12" SXPP1A443

## (undated) DEVICE — SU DERMABOND PRINEO 22CM CLR222US

## (undated) DEVICE — LINEN DRAPE 54X72" 5467

## (undated) DEVICE — DRSG KERLIX FLUFFS X5

## (undated) DEVICE — DRAIN JACKSON PRATT RESERVOIR 100ML SU130-1305

## (undated) DEVICE — DRAIN HEMOVAC RESERVOIR KIT 10FR 1/8" MED 00-2550-002-10

## (undated) DEVICE — SYR 50ML LL W/O NDL 309653

## (undated) DEVICE — SPONGE KITTNER 31001010

## (undated) DEVICE — ESU ELEC BLADE 6" COATED E1450-6

## (undated) DEVICE — DRILL BIT FLEXIBLE REFLECTION 25MM  71362925

## (undated) DEVICE — SUTURE MONOCRYL+ 3-0 PS-1 27" UNDYED MCP936H

## (undated) DEVICE — GLOVE PROTEXIS BLUE W/NEU-THERA 7.0  2D73EB70

## (undated) DEVICE — NDL 20GA 1.5"

## (undated) DEVICE — GOWN IMPERVIOUS SPECIALTY XLG/XLONG 32474

## (undated) DEVICE — BAG CLEAR TRASH 1.3M 39X33" P4040C

## (undated) DEVICE — SU VICRYL 1 CT-1 36" J347H

## (undated) DEVICE — GLOVE PROTEXIS W/NEU-THERA 7.5  2D73TE75

## (undated) DEVICE — ESU GROUND PAD ADULT W/CORD E7507

## (undated) DEVICE — DRSG GAUZE 2X2" 8042

## (undated) DEVICE — GLOVE PROTEXIS BLUE W/NEU-THERA 8.5  2D73EB85

## (undated) DEVICE — ESU CORD BIPOLAR GREEN 10-4000

## (undated) DEVICE — LINEN BACK PACK 5440

## (undated) RX ORDER — CEFAZOLIN SODIUM 2 G/100ML
INJECTION, SOLUTION INTRAVENOUS
Status: DISPENSED
Start: 2019-04-03

## (undated) RX ORDER — LIDOCAINE HYDROCHLORIDE 20 MG/ML
INJECTION, SOLUTION EPIDURAL; INFILTRATION; INTRACAUDAL; PERINEURAL
Status: DISPENSED
Start: 2019-04-03

## (undated) RX ORDER — CEFAZOLIN SODIUM 1 G/3ML
INJECTION, POWDER, FOR SOLUTION INTRAMUSCULAR; INTRAVENOUS
Status: DISPENSED
Start: 2019-04-03

## (undated) RX ORDER — ACETAMINOPHEN 325 MG/1
TABLET ORAL
Status: DISPENSED
Start: 2019-04-03

## (undated) RX ORDER — ACETAMINOPHEN 325 MG/1
TABLET ORAL
Status: DISPENSED
Start: 2025-06-18

## (undated) RX ORDER — FENTANYL CITRATE-0.9 % NACL/PF 10 MCG/ML
PLASTIC BAG, INJECTION (ML) INTRAVENOUS
Status: DISPENSED
Start: 2025-06-18

## (undated) RX ORDER — DEXAMETHASONE SODIUM PHOSPHATE 4 MG/ML
INJECTION, SOLUTION INTRA-ARTICULAR; INTRALESIONAL; INTRAMUSCULAR; INTRAVENOUS; SOFT TISSUE
Status: DISPENSED
Start: 2025-06-18

## (undated) RX ORDER — TRANEXAMIC ACID 650 MG/1
TABLET ORAL
Status: DISPENSED
Start: 2025-06-18

## (undated) RX ORDER — PHENYLEPHRINE HCL IN 0.9% NACL 1 MG/10 ML
SYRINGE (ML) INTRAVENOUS
Status: DISPENSED
Start: 2019-04-03

## (undated) RX ORDER — FENTANYL CITRATE 50 UG/ML
INJECTION, SOLUTION INTRAMUSCULAR; INTRAVENOUS
Status: DISPENSED
Start: 2019-04-03

## (undated) RX ORDER — HYDROMORPHONE HYDROCHLORIDE 1 MG/ML
INJECTION, SOLUTION INTRAMUSCULAR; INTRAVENOUS; SUBCUTANEOUS
Status: DISPENSED
Start: 2019-04-03

## (undated) RX ORDER — VANCOMYCIN HYDROCHLORIDE 1 G/20ML
INJECTION, POWDER, LYOPHILIZED, FOR SOLUTION INTRAVENOUS
Status: DISPENSED
Start: 2025-06-18

## (undated) RX ORDER — HYDRALAZINE HYDROCHLORIDE 20 MG/ML
INJECTION INTRAMUSCULAR; INTRAVENOUS
Status: DISPENSED
Start: 2025-06-18

## (undated) RX ORDER — VANCOMYCIN HYDROCHLORIDE 1 G/20ML
INJECTION, POWDER, LYOPHILIZED, FOR SOLUTION INTRAVENOUS
Status: DISPENSED
Start: 2019-04-03

## (undated) RX ORDER — DEXAMETHASONE SODIUM PHOSPHATE 4 MG/ML
INJECTION, SOLUTION INTRA-ARTICULAR; INTRALESIONAL; INTRAMUSCULAR; INTRAVENOUS; SOFT TISSUE
Status: DISPENSED
Start: 2019-04-03

## (undated) RX ORDER — PROPOFOL 10 MG/ML
INJECTION, EMULSION INTRAVENOUS
Status: DISPENSED
Start: 2019-04-03

## (undated) RX ORDER — FENTANYL CITRATE 50 UG/ML
INJECTION, SOLUTION INTRAMUSCULAR; INTRAVENOUS
Status: DISPENSED
Start: 2025-06-18

## (undated) RX ORDER — PROPOFOL 10 MG/ML
INJECTION, EMULSION INTRAVENOUS
Status: DISPENSED
Start: 2025-06-18

## (undated) RX ORDER — EPHEDRINE SULFATE 50 MG/ML
INJECTION, SOLUTION INTRAMUSCULAR; INTRAVENOUS; SUBCUTANEOUS
Status: DISPENSED
Start: 2025-06-18

## (undated) RX ORDER — ONDANSETRON 2 MG/ML
INJECTION INTRAMUSCULAR; INTRAVENOUS
Status: DISPENSED
Start: 2019-04-03

## (undated) RX ORDER — BUPIVACAINE HYDROCHLORIDE AND EPINEPHRINE 2.5; 5 MG/ML; UG/ML
INJECTION, SOLUTION INFILTRATION; PERINEURAL
Status: DISPENSED
Start: 2019-04-03

## (undated) RX ORDER — ONDANSETRON 2 MG/ML
INJECTION INTRAMUSCULAR; INTRAVENOUS
Status: DISPENSED
Start: 2025-06-18

## (undated) RX ORDER — SODIUM CHLORIDE, SODIUM LACTATE, POTASSIUM CHLORIDE, CALCIUM CHLORIDE 600; 310; 30; 20 MG/100ML; MG/100ML; MG/100ML; MG/100ML
INJECTION, SOLUTION INTRAVENOUS
Status: DISPENSED
Start: 2019-04-03

## (undated) RX ORDER — GABAPENTIN 100 MG/1
CAPSULE ORAL
Status: DISPENSED
Start: 2019-04-03

## (undated) RX ORDER — LIDOCAINE HYDROCHLORIDE 10 MG/ML
INJECTION, SOLUTION EPIDURAL; INFILTRATION; INTRACAUDAL; PERINEURAL
Status: DISPENSED
Start: 2025-06-18

## (undated) RX ORDER — CEFAZOLIN SODIUM/WATER 2 G/20 ML
SYRINGE (ML) INTRAVENOUS
Status: DISPENSED
Start: 2025-06-18